# Patient Record
Sex: FEMALE | Race: BLACK OR AFRICAN AMERICAN | NOT HISPANIC OR LATINO | Employment: UNEMPLOYED | URBAN - METROPOLITAN AREA
[De-identification: names, ages, dates, MRNs, and addresses within clinical notes are randomized per-mention and may not be internally consistent; named-entity substitution may affect disease eponyms.]

---

## 2017-01-03 ENCOUNTER — TELEPHONE (OUTPATIENT)
Dept: ALLERGY | Facility: CLINIC | Age: 42
End: 2017-01-03

## 2017-01-03 NOTE — TELEPHONE ENCOUNTER
----- Message from Iam Sainz MA sent at 12/28/2016  4:11 PM CST -----  Contact: Cheyanne mcgrath/[facility] - [phone] Fax # [phone]  Requesting a New  Rx for Hizentra w/Supplies. Please call. Thanks!

## 2017-01-13 DIAGNOSIS — M35.00 SJOGREN'S SYNDROME: ICD-10-CM

## 2017-01-13 DIAGNOSIS — K11.1 PAROTID GLAND ENLARGEMENT: ICD-10-CM

## 2017-01-13 RX ORDER — HYDROXYCHLOROQUINE SULFATE 200 MG/1
400 TABLET, FILM COATED ORAL DAILY
Qty: 120 TABLET | Refills: 3 | Status: SHIPPED | OUTPATIENT
Start: 2017-01-13 | End: 2017-03-28 | Stop reason: SDUPTHER

## 2017-01-18 ENCOUNTER — TELEPHONE (OUTPATIENT)
Dept: ALLERGY | Facility: CLINIC | Age: 42
End: 2017-01-18

## 2017-01-18 NOTE — TELEPHONE ENCOUNTER
Received fax from [facility] for patient's plan of care.  Form completed. Please review and sign and I will fax back to [phone].    Form placed on your desk.

## 2017-03-28 ENCOUNTER — OFFICE VISIT (OUTPATIENT)
Dept: RHEUMATOLOGY | Facility: CLINIC | Age: 42
End: 2017-03-28
Payer: COMMERCIAL

## 2017-03-28 VITALS
HEART RATE: 84 BPM | SYSTOLIC BLOOD PRESSURE: 99 MMHG | DIASTOLIC BLOOD PRESSURE: 70 MMHG | HEIGHT: 64 IN | WEIGHT: 208.88 LBS | BODY MASS INDEX: 35.66 KG/M2

## 2017-03-28 DIAGNOSIS — K11.1 PAROTID GLAND ENLARGEMENT: ICD-10-CM

## 2017-03-28 DIAGNOSIS — D80.3 IGG2 DEFICIENCY: ICD-10-CM

## 2017-03-28 DIAGNOSIS — E66.9 OBESITY (BMI 30-39.9): ICD-10-CM

## 2017-03-28 DIAGNOSIS — M35.00 SJOGREN'S SYNDROME: Primary | ICD-10-CM

## 2017-03-28 DIAGNOSIS — Z79.899 LONG-TERM USE OF HYDROXYCHLOROQUINE: ICD-10-CM

## 2017-03-28 PROCEDURE — 99214 OFFICE O/P EST MOD 30 MIN: CPT | Mod: 25,S$GLB,, | Performed by: INTERNAL MEDICINE

## 2017-03-28 PROCEDURE — 1160F RVW MEDS BY RX/DR IN RCRD: CPT | Mod: S$GLB,,, | Performed by: INTERNAL MEDICINE

## 2017-03-28 PROCEDURE — 99999 PR PBB SHADOW E&M-EST. PATIENT-LVL III: CPT | Mod: PBBFAC,,, | Performed by: INTERNAL MEDICINE

## 2017-03-28 RX ORDER — MEDROXYPROGESTERONE ACETATE 10 MG/1
10 TABLET ORAL DAILY
Refills: 0 | COMMUNITY
Start: 2017-02-24 | End: 2017-04-27

## 2017-03-28 RX ORDER — PILOCARPINE HYDROCHLORIDE 5 MG/1
5 TABLET, FILM COATED ORAL 4 TIMES DAILY
Qty: 360 TABLET | Refills: 3 | Status: SHIPPED | OUTPATIENT
Start: 2017-03-28 | End: 2018-02-08

## 2017-03-28 RX ORDER — HYDROXYCHLOROQUINE SULFATE 200 MG/1
400 TABLET, FILM COATED ORAL DAILY
Qty: 120 TABLET | Refills: 3 | Status: SHIPPED | OUTPATIENT
Start: 2017-03-28 | End: 2017-08-14 | Stop reason: SDUPTHER

## 2017-03-28 ASSESSMENT — ROUTINE ASSESSMENT OF PATIENT INDEX DATA (RAPID3)
PATIENT GLOBAL ASSESSMENT SCORE: 4
AM STIFFNESS SCORE: 1, YES
PAIN SCORE: 2
TOTAL RAPID3 SCORE: 2
WHEN YOU AWAKENED IN THE MORNING OVER THE LAST WEEK, PLEASE INDICATE THE AMOUNT OF TIME IT TAKES UNTIL YOU ARE AS LIMBER AS YOU WILL BE FOR THE DAY: 15 MIN
FATIGUE SCORE: 6
PSYCHOLOGICAL DISTRESS SCORE: 1.1
MDHAQ FUNCTION SCORE: 0

## 2017-03-28 NOTE — PROGRESS NOTES
Subjective:       Patient ID: Manasa Hollins is a 41 y.o. female with Sjogren's syndrome.     Chief Complaint: Follow-up    Returns for follow-up. Last seen on 11/22/16. Has Sjogren's with + serology & marked hypergammaglobulinemia but with IgG2 deficiency and recurrent ear & sinus infections. She was prescribed Hizentra (immune globulin) SC and started it at the end of December of last year. She also has anemia, most likely of chronic disease. She has been seen by hematology who felt anemia was due to chronic inflammation with associated abnormal utilization of iron..    She is doing well. She is taking HCQ and has eye exams and salagen which she supplements with OTC DenTek OraMoist patches at night. She has not had any more parotid gland swelling.She uses OTC drops for dry eyes. (She is followed by outside ophthalmologist for both Sjogren's & HCQ use (400 mg/d). Was told she does not have eye staining.) Swallowing improved. She denies AM stiffness,. Denies joint pain except still for infrequent  bilateral knee pain at times associated with doing too much; denies joint swelling, Raynaud's, tight skin, alopecia, oral ulcers, pericarditis, photosensitivity, skin rashes, miscarriages (but had one premature birth at 26.5 weeks) & thromboses.    Pertinent hx from previous visit:   40 yr old lady with chronic sinusitis with dry nose, mouth and & dry eyes (keratitis documented-photo displayed on 10/12/15 visit) initially sent to rule out Sjogren's after she requested testing and was found to have a +MANISHA 1:250 sp with +SSA & +SSB. Found to have non tender parotid gland enlargement.  She was previously begun on pilocarpine 5 mg qid  Sxs remain the same--she minimizes them. She did not check with pulmonary as she was admitted in November with fever and bilateral otomastoiditis. She is now improved. Gets does get frequent dental cleaning              She reports no joint swelling. Associated symptoms include fatigue.  Pertinent negatives include no fever, trouble swallowing, myalgias or headaches.          Current Outpatient Prescriptions   Medication Sig Dispense Refill    albuterol (PROAIR HFA) 90 mcg/actuation inhaler INHALE 2 PUFFS INTO THE LUNGS EVERY 4 HOURS AS NEEDED 8.5 g 11    b complex vitamins tablet Take 1 tablet by mouth once daily.      CALCIUM CARBONATE/VITAMIN D3 (VITAMIN D-3 ORAL) Take 4,000 Units by mouth once daily.      calcium-magnesium-zinc 333-133-5 mg Tab Take 1 tablet by mouth once daily.      CARBOXYMETHYLCELLULOSE SODIUM (REFRESH TEARS OPHT) Apply to eye.      fluticasone (FLONASE) 50 mcg/actuation nasal spray 2 sprays left nostril BID; 2 sprays right nostril once a day 1 Bottle 3    GLYCERIN/PROPYLENE GLYCOL (SOOTHE LUBRICANT OPHT) Apply to eye.      hydroxychloroquine (PLAQUENIL) 200 mg tablet Take 2 tablets (400 mg total) by mouth once daily. 120 tablet 3    immun glob G,IgG,-pro-IgA 0-50 (HIZENTRA) 10 gram/50 mL (20 %) Soln Inject 10 g into the skin once a week. First infusion must be done at infusion center due to hx sulfa allergy 4 vial 11    levocetirizine (XYZAL) 5 MG tablet TAKE 1 TABLET BY MOUTH EVERY EVENING 30 tablet 1    linaclotide (LINZESS) 145 mcg Cap capsule Take 1 capsule (145 mcg total) by mouth once daily. 30 capsule 11    omeprazole (PRILOSEC) 40 MG capsule TAKE 1 CAPSULE (40 MG TOTAL) BY MOUTH EVERY MORNING. 90 capsule 3    pilocarpine (SALAGEN) 5 MG Tab Take 1 tablet (5 mg total) by mouth 4 (four) times daily. 360 tablet 3    PREVIDENT 5000 PLUS 1.1 % Crea       saliva stimulant agents comb.3 (BIOTENE MOISTURIZING MOUTH) Spry by Mucous Membrane route.      SALIVA SUBSTITUTION COMBO NO.9 (BIOTENE DRY MOUTH ORAL RINSE MM) by Mucous Membrane route.      SODIUM CHLORIDE/ALOE VERA (AYR SALINE GEL NASL) by Nasal route.      vitamin A 8000 UNIT capsule Take 8,000 Units by mouth once daily.      ADVAIR DISKUS 250-50 mcg/dose diskus inhaler       famotidine (PEPCID) 20  MG tablet Take 1 tablet (20 mg total) by mouth 2 (two) times daily. 20 tablet 0    medroxyPROGESTERone (PROVERA) 10 MG tablet Take 10 mg by mouth once daily.  0    ondansetron (ZOFRAN-ODT) 4 MG TbDL Take 2 tablets (8 mg total) by mouth every 8 (eight) hours as needed (Nausea). 15 tablet 0     No current facility-administered medications for this visit.          Review of Systems   Constitutional: Positive for fatigue. Negative for diaphoresis and fever.   HENT: Negative for mouth sores, sore throat, tinnitus and trouble swallowing.         Dry mouth   Eyes: Negative.  Negative for visual disturbance.        Dry eyes   Respiratory: Positive for cough and shortness of breath (warm weather makes her pant; maybe allergic to oak trees). Negative for choking and chest tightness.    Cardiovascular: Negative for palpitations and leg swelling.   Gastrointestinal: Negative for abdominal distention, abdominal pain, blood in stool, constipation (helped by linzess), diarrhea, nausea and vomiting.        Heartburn   Endocrine: Positive for cold intolerance.   Genitourinary: Negative.  Negative for frequency, hematuria and menstrual problem.   Musculoskeletal: Positive for arthralgias (knees without swelling.). Negative for back pain, joint swelling, myalgias, neck pain and neck stiffness.   Skin: Negative.  Negative for rash.   Allergic/Immunologic: Negative.    Neurological: Negative for dizziness, syncope, weakness, light-headedness, numbness and headaches.   Hematological: Negative.  Negative for adenopathy. Does not bruise/bleed easily.   Psychiatric/Behavioral: Positive for sleep disturbance. Negative for dysphoric mood. The patient is not nervous/anxious.          Review of patient's allergies indicates:   Allergen Reactions    Sulfa dyne      Other reaction(s): CAUSES ASTHMA ATTACK         Past Surgical History:   Procedure Laterality Date     SECTION      x4    MYOMECTOMY      TUBAL LIGATION      done during  "myometomy surgery.    upper gi  2016         Family History   Problem Relation Age of Onset    Cancer Mother      Lung/Cervical    Cancer Maternal Grandmother      Breast/Cervical    Cancer Other      Breast     Asthma Child     Emphysema Neg Hx     Colon cancer Neg Hx     Stomach cancer Neg Hx     Esophageal cancer Neg Hx     Ulcerative colitis Neg Hx     Crohn's disease Neg Hx     Irritable bowel syndrome Neg Hx     Celiac disease Neg Hx    Mom & Dad both had sickle cell trait.  Mom  with lung cancer (non smoker) & had cervical cancer and another cancer as well.    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   Objective:     Ht 5' 4" (1.626 m)  Wt 94.8 kg (208 lb 14.4 oz)  LMP 2017 (Exact Date)  BMI 35.86 kg/m2   Was 198 lbs 12.8 oz on 16.  Was 191 lbs 6.4 oz on 16.   Physical Exam   Vitals reviewed.  Constitutional: She is oriented to person, place, and time and well-developed, well-nourished, and in no distress. No distress.   HENT:   Head: Normocephalic and atraumatic.   Mouth/Throat: Oropharynx is clear and moist. No oropharyngeal exudate.   No facial rashes  Parotids minimally enlarged &, non tender  Lacrimals minimally enlarged  Decreased  moisture of oral mucosa   No oral ulcers  Teeth in good repair   Eyes: Conjunctivae and EOM are normal. Pupils are equal, round, and reactive to light. Right eye exhibits no discharge. Left eye exhibits no discharge. No scleral icterus.   Neck: Neck supple. No JVD present. No tracheal deviation present. No thyromegaly present.   Cardiovascular: Normal rate, regular rhythm, normal heart sounds and intact distal pulses.  Exam reveals no gallop and no friction rub.    No murmur heard.  Pulmonary/Chest: Breath sounds " normal. No respiratory distress. She has no wheezes. She has no rales. She exhibits no tenderness.   Abdominal: Soft. Bowel sounds are normal. She exhibits no distension and no mass. There is no splenomegaly or hepatomegaly. There is no tenderness. There is no rebound and no guarding.   Lymphadenopathy:     She has no cervical adenopathy.        Right: No inguinal adenopathy present.        Left: No inguinal adenopathy present.   Neurological: She is alert and oriented to person, place, and time. She has normal reflexes. No cranial nerve deficit. Gait normal.   Proximal and distal muscle strength 5/5 except for 4+/5 proximal LE; can squat but cannot get up without falling.   Skin: Skin is warm and dry. No rash noted. She is not diaphoretic.     Psychiatric: Mood, memory, affect and judgment normal.   Musculoskeletal: Normal range of motion. She exhibits no edema or tenderness.   Cspine FROM no tenderness  Tspine FROM no tenderness  Lspine FROM no tenderness.  TMJ: unremarkable  Shoulders: FROM; no synovitis;  Elbows: FROM; no synovitis; no tophi or nodules  Wrists: FROM; no synovitis;    MCPs: FROM; Mild enlargement of 1st MCP on left;no synovitis; no metacarpalgia;  ok;  PIPs: FROM; no synovitis; no tenderness of any joints.   DIPs: FROM; no synovitis;   HIPS: FROM  Knees: FROM; no crepitus; no synovitis; no instability;  Ankles: FROM: no synovitis   Toes: ok; no metatarsalgia             LABS:  12/21/16: Hg 9.8; Ht 29.6; Na 135; Alb. 2.9;   8/24/16: ; CRP 3.9; Hg 8.2; Ht 25.2; CMP TP 9.4; Alb. 2.4; Ca 8.4; CPK 88; SPE elevated TP & gammaglobulins with oligoclonal bands.   7/2/16: ;   5/3/16: Hg 8.9; Ht 27.5; IgG1 3490 (1140); IgG2 56 (150);  4/21/16: ; CRP 6.7; Hg 9.1; Ht 28.7; CMP Alb. 2.8; TP 9.4; UA ok; U pr/cnne .37 (.20)  3/11/16: CMP Ca 8.2; Alb. 1.4  3/7/16: UA 3+ pr; 2+ OB; * RBCs 16 WBC, 9 HC  12/1/15: Hg 7.3; Ht 22.5; K+ 3.4; Ca 8.6;   8/18/15: UA ok;  8/12/15: LAC neg; aCLA neg;  "C3, C4 ok; dsDNA neg;       Imagin16: Bilateral knee x-rays: personally reviewed: very mild bilateral DJD.  16: CT chest: personally reviewed: unremarkable.  16: CXR: personally reviewed: prob. Ok; RLL opacity c/w scarring  10/12/15: CXR: personally reviewed with patient: OK  10/12/15: Bilateral Hands: personally reviewed with patient: ok  Assessment:   Sjogren's syndrome -- stable/improved    Sicca sxs with dry mouth with dental carries, dry eyes & dry mouth     Response to pilocarpine      But cough & mild SOB following--patient opts to continue    Bilateral parotid gland swelling    +MANISHA 1:2560; +SSA; +SSB: TP 10.1;     RF neg; LAC, aCLA neg (premature birth at 26.5 wks).     C3, C4, UA ok; elevated IgG 4489 (1600); IgA 495 (350);     + FH SLE (sister)    Bilateral knee pain/weakness--stable   Imaging with minimal bilateral OA      Recurrent ENT & possibly pulmonary infections associated with IgG2 deficiency   S/P bilateral otomastoiditis with fever hospitalized 11/15    Possibly parotidomegaly played a role.   Pulmonary issues:     S/P CAP 3/17/16:     Also with chronic cough & SOB     She's had "allergic cough" in past.     Salagen as a culprit ruled out     CT chest 16: unremarkable   Inadequate response to pneumovax   IgG2 deficiency on Hizentra     Reactive Airways/Asthma    Some response to advair    Anemia: NCNC--probably mostly chronic disease with mild iron deficiency--improved    Hg electrophoresis A2   Lowest Ht 20.8;    Mom & Dad had trait   Some component of iron deficiency in past   retic ok; MMA ok;    G6PD ok; hapto not low    Obesity-worsening      Plan:   Stay on  mg/d with eye exams.  Continue pilocarpine 5 mg qid  Continue paraffin wax, OTC DenTek Ora Moist Patches, etc.   We have ordered labs to add to those Dr. Alejo will order labs as f/u on Hizentra.  Counseled on weight loss--through proper diet and exercise. Counseled on eating strategies.  RTC 5 " months or prn.

## 2017-03-28 NOTE — MR AVS SNAPSHOT
[facility]  [address]  [facility] [address]  Phone: [phone]  Fax: [phone]                  Manasa Hollins   3/28/2017 8:30 AM   Office Visit    Description:  Female : 1975   Provider:  Susan Bustos MD   Department:  [facility] - Rheumatology           Reason for Visit     Follow-up           Diagnoses this Visit        Comments    Sjogren's syndrome    -  Primary     Parotid gland enlargement         IgG2 deficiency         Long-term use of hydroxychloroquine         Obesity (BMI 30-39.9)                To Do List           Goals (5 Years of Data)     None       These Medications        Disp Refills Start End    pilocarpine (SALAGEN) 5 MG Tab 360 tablet 3 3/28/2017     Take 1 tablet (5 mg total) by mouth 4 (four) times daily. - Oral    Pharmacy: [facility]/pharmacy #[id number] [facility] [address] [facility] Ph #: [phone]       hydroxychloroquine (PLAQUENIL) 200 mg tablet 120 tablet 3 3/28/2017     Take 2 tablets (400 mg total) by mouth once daily. - Oral    Pharmacy: [facility]/pharmacy #[id number] [facility] [address] [facility] Ph #: [phone]         [facility]     [facility] On Call Nurse Care Line -  Assistance  Registered nurses in the [facility] provide clinical advisement, health education, appointment booking, and other advisory services.  Call for this free service at [phone].             Medications           Message regarding Medications     Verify the changes and/or additions to your medication regime listed below are the same as discussed with your clinician today.  If any of these changes or additions are incorrect, please notify your healthcare provider.             Verify that the below list of medications is an accurate representation of the medications you are currently taking.  If none reported, the list may be blank. If incorrect, please contact your healthcare provider. Carry  this list with you in case of emergency.           Current Medications     albuterol (PROAIR HFA) 90 mcg/actuation inhaler INHALE 2 PUFFS INTO THE LUNGS EVERY 4 HOURS AS NEEDED    b complex vitamins tablet Take 1 tablet by mouth once daily.    CALCIUM CARBONATE/VITAMIN D3 (VITAMIN D-3 ORAL) Take 4,000 Units by mouth once daily.    calcium-magnesium-zinc 333-133-5 mg Tab Take 1 tablet by mouth once daily.    CARBOXYMETHYLCELLULOSE SODIUM (REFRESH TEARS OPHT) Apply to eye.    fluticasone (FLONASE) 50 mcg/actuation nasal spray 2 sprays left nostril BID; 2 sprays right nostril once a day    GLYCERIN/PROPYLENE GLYCOL (SOOTHE LUBRICANT OPHT) Apply to eye.    hydroxychloroquine (PLAQUENIL) 200 mg tablet Take 2 tablets (400 mg total) by mouth once daily.    immun glob G,IgG,-pro-IgA 0-50 (HIZENTRA) 10 gram/50 mL (20 %) Soln Inject 10 g into the skin once a week. First infusion must be done at infusion center due to hx sulfa allergy    levocetirizine (XYZAL) 5 MG tablet TAKE 1 TABLET BY MOUTH EVERY EVENING    linaclotide (LINZESS) 145 mcg Cap capsule Take 1 capsule (145 mcg total) by mouth once daily.    omeprazole (PRILOSEC) 40 MG capsule TAKE 1 CAPSULE (40 MG TOTAL) BY MOUTH EVERY MORNING.    pilocarpine (SALAGEN) 5 MG Tab Take 1 tablet (5 mg total) by mouth 4 (four) times daily.    PREVIDENT 5000 PLUS 1.1 % Crea     saliva stimulant agents comb.3 (BIOTENE MOISTURIZING MOUTH) Spry by Mucous Membrane route.    SALIVA SUBSTITUTION COMBO NO.9 (BIOTENE DRY MOUTH ORAL RINSE MM) by Mucous Membrane route.    SODIUM CHLORIDE/ALOE VERA (AYR SALINE GEL NASL) by Nasal route.    vitamin A 8000 UNIT capsule Take 8,000 Units by mouth once daily.    ADVAIR DISKUS 250-50 mcg/dose diskus inhaler     famotidine (PEPCID) 20 MG tablet Take 1 tablet (20 mg total) by mouth 2 (two) times daily.    medroxyPROGESTERone (PROVERA) 10 MG tablet Take 10 mg by mouth once daily.    ondansetron (ZOFRAN-ODT) 4 MG TbDL Take 2 tablets (8 mg total) by mouth  "every 8 (eight) hours as needed (Nausea).           Clinical Reference Information           Your Vitals Were     BP Pulse Height Weight Last Period BMI    99/70 (BP Location: Left arm, Patient Position: Sitting, BP Method: Automatic) 84 5' 4" (1.626 m) 94.8 kg (208 lb 14.4 oz) 02/07/2017 (Exact Date) 35.86 kg/m2      Blood Pressure          Most Recent Value    BP  99/70      Allergies as of 3/28/2017     Sulfa Dyne      Immunizations Administered on Date of Encounter - 3/28/2017     None      Orders Placed During Today's Visit     Recurring Lab Work Interval Expires    C-reactive protein   3/28/2018    CBC auto differential   3/28/2018    Comprehensive metabolic panel   3/28/2018    Sedimentation rate, manual   3/28/2018      Language Assistance Services     ATTENTION: Language assistance services are available, free of charge. Please call [phone].      ATENCIÓN: Si habla español, tiene a joel disposición servicios gratuitos de asistencia lingüística. Llame al [phone].     LIUS MIGUEL Ý: N?u b?n nói Ti?ng Vi?t, có các d?ch v? h? tr? ngôn ng? mi?n phí dành cho b?n. G?i s? [phone].         [facility] complies with applicable Federal civil rights laws and does not discriminate on the basis of race, color, national origin, age, disability, or sex.        "

## 2017-04-19 ENCOUNTER — PATIENT MESSAGE (OUTPATIENT)
Dept: FAMILY MEDICINE | Facility: CLINIC | Age: 42
End: 2017-04-19

## 2017-04-19 ENCOUNTER — PATIENT MESSAGE (OUTPATIENT)
Dept: ALLERGY | Facility: CLINIC | Age: 42
End: 2017-04-19

## 2017-04-19 NOTE — TELEPHONE ENCOUNTER
Please advise.  When do you want to see this patient. Her first Hizentra treatment was 12/23/2016.

## 2017-04-20 RX ORDER — AZELASTINE HYDROCHLORIDE 0.5 MG/ML
1 SOLUTION/ DROPS OPHTHALMIC 2 TIMES DAILY
Qty: 4 ML | Refills: 0 | Status: SHIPPED | OUTPATIENT
Start: 2017-04-20 | End: 2017-06-01

## 2017-04-27 ENCOUNTER — OFFICE VISIT (OUTPATIENT)
Dept: ALLERGY | Facility: CLINIC | Age: 42
End: 2017-04-27
Payer: COMMERCIAL

## 2017-04-27 ENCOUNTER — LAB VISIT (OUTPATIENT)
Dept: LAB | Facility: HOSPITAL | Age: 42
End: 2017-04-27
Attending: INTERNAL MEDICINE
Payer: COMMERCIAL

## 2017-04-27 VITALS
OXYGEN SATURATION: 92 % | HEIGHT: 64 IN | HEART RATE: 82 BPM | DIASTOLIC BLOOD PRESSURE: 72 MMHG | BODY MASS INDEX: 34.77 KG/M2 | WEIGHT: 203.69 LBS | SYSTOLIC BLOOD PRESSURE: 128 MMHG

## 2017-04-27 DIAGNOSIS — J33.9 LEFT NASAL POLYPS: ICD-10-CM

## 2017-04-27 DIAGNOSIS — M35.00 SJOGREN'S SYNDROME, WITH UNSPECIFIED ORGAN INVOLVEMENT: ICD-10-CM

## 2017-04-27 DIAGNOSIS — D80.3 IGG2 SUBCLASS DEFICIENCY: ICD-10-CM

## 2017-04-27 DIAGNOSIS — K21.9 GASTROESOPHAGEAL REFLUX DISEASE, ESOPHAGITIS PRESENCE NOT SPECIFIED: ICD-10-CM

## 2017-04-27 DIAGNOSIS — M35.00 SJOGREN'S SYNDROME: ICD-10-CM

## 2017-04-27 DIAGNOSIS — D80.6 ANTI-POLYSACCHARIDE ANTIBODY DEFICIENCY: Primary | ICD-10-CM

## 2017-04-27 DIAGNOSIS — K11.1 PAROTID GLAND ENLARGEMENT: ICD-10-CM

## 2017-04-27 LAB
ALBUMIN SERPL BCP-MCNC: 2.6 G/DL
ALP SERPL-CCNC: 27 U/L
ALT SERPL W/O P-5'-P-CCNC: 18 U/L
ANION GAP SERPL CALC-SCNC: 6 MMOL/L
AST SERPL-CCNC: 23 U/L
BASOPHILS # BLD AUTO: 0.02 K/UL
BASOPHILS NFR BLD: 0.4 %
BILIRUB SERPL-MCNC: 0.5 MG/DL
BUN SERPL-MCNC: 12 MG/DL
CALCIUM SERPL-MCNC: 9 MG/DL
CHLORIDE SERPL-SCNC: 108 MMOL/L
CO2 SERPL-SCNC: 19 MMOL/L
CREAT SERPL-MCNC: 0.7 MG/DL
CRP SERPL-MCNC: 4.6 MG/L
DIFFERENTIAL METHOD: ABNORMAL
EOSINOPHIL # BLD AUTO: 0.1 K/UL
EOSINOPHIL NFR BLD: 1.1 %
ERYTHROCYTE [DISTWIDTH] IN BLOOD BY AUTOMATED COUNT: 14.3 %
ERYTHROCYTE [SEDIMENTATION RATE] IN BLOOD BY WESTERGREN METHOD: 135 MM/HR
EST. GFR  (AFRICAN AMERICAN): >60 ML/MIN/1.73 M^2
EST. GFR  (NON AFRICAN AMERICAN): >60 ML/MIN/1.73 M^2
GLUCOSE SERPL-MCNC: 87 MG/DL
HCT VFR BLD AUTO: 28.9 %
HGB BLD-MCNC: 9.4 G/DL
LYMPHOCYTES # BLD AUTO: 1.2 K/UL
LYMPHOCYTES NFR BLD: 22.5 %
MCH RBC QN AUTO: 28.8 PG
MCHC RBC AUTO-ENTMCNC: 32.5 %
MCV RBC AUTO: 89 FL
MONOCYTES # BLD AUTO: 0.3 K/UL
MONOCYTES NFR BLD: 6 %
NEUTROPHILS # BLD AUTO: 3.8 K/UL
NEUTROPHILS NFR BLD: 69.8 %
PLATELET # BLD AUTO: 195 K/UL
PMV BLD AUTO: 11.7 FL
POTASSIUM SERPL-SCNC: 4.2 MMOL/L
PROT SERPL-MCNC: 10.1 G/DL
RBC # BLD AUTO: 3.26 M/UL
SODIUM SERPL-SCNC: 133 MMOL/L
WBC # BLD AUTO: 5.46 K/UL

## 2017-04-27 PROCEDURE — 1160F RVW MEDS BY RX/DR IN RCRD: CPT | Mod: S$GLB,,, | Performed by: ALLERGY & IMMUNOLOGY

## 2017-04-27 PROCEDURE — 36415 COLL VENOUS BLD VENIPUNCTURE: CPT

## 2017-04-27 PROCEDURE — 85651 RBC SED RATE NONAUTOMATED: CPT

## 2017-04-27 PROCEDURE — 99999 PR PBB SHADOW E&M-EST. PATIENT-LVL IV: CPT | Mod: PBBFAC,,, | Performed by: ALLERGY & IMMUNOLOGY

## 2017-04-27 PROCEDURE — 80053 COMPREHEN METABOLIC PANEL: CPT

## 2017-04-27 PROCEDURE — 86140 C-REACTIVE PROTEIN: CPT

## 2017-04-27 PROCEDURE — 99214 OFFICE O/P EST MOD 30 MIN: CPT | Mod: S$GLB,,, | Performed by: ALLERGY & IMMUNOLOGY

## 2017-04-27 PROCEDURE — 85025 COMPLETE CBC W/AUTO DIFF WBC: CPT

## 2017-04-27 RX ORDER — ALBUTEROL SULFATE 90 UG/1
2 AEROSOL, METERED RESPIRATORY (INHALATION) EVERY 4 HOURS PRN
Qty: 1 INHALER | Refills: 3 | Status: SHIPPED | OUTPATIENT
Start: 2017-04-27 | End: 2018-02-08 | Stop reason: SDUPTHER

## 2017-04-27 RX ORDER — FLUTICASONE PROPIONATE 50 MCG
SPRAY, SUSPENSION (ML) NASAL
Qty: 1 BOTTLE | Refills: 3 | Status: SHIPPED | OUTPATIENT
Start: 2017-04-27 | End: 2018-01-11

## 2017-04-27 NOTE — MR AVS SNAPSHOT
[facility]  [address]  [facility] [address]  Phone: [phone]  Fax: [phone]                  Manasa Hollins   2017 9:30 AM   Office Visit    Description:  Female : 1975   Provider:  Ivan Alejo MD   Department:  [facility]           Reason for Visit     Follow-up           Diagnoses this Visit        Comments    S/P functional endoscopic sinus surgery         Left nasal polyps                To Do List           Future Appointments        Provider Department Dept Phone    2017 10:20 AM LAB, APPOINTMENT [facility] [phone]    2017 4:00 PM Sawyer Bernardo MD [facility] Otorhinolaryngology [phone]      Goals (5 Years of Data)     None       These Medications        Disp Refills Start End    albuterol 90 mcg/actuation inhaler 1 Inhaler 3 2017     Inhale 2 puffs into the lungs every 4 (four) hours as needed for Wheezing or Shortness of Breath. - Inhalation    Pharmacy: [facility]/pharmacy #[id number] [facility] [address] [employer] Ph #: [phone]       fluticasone (FLONASE) 50 mcg/actuation nasal spray 1 Bottle 3 2017     2 sprays left nostril BID; 2 sprays right nostril once a day    Pharmacy: [facility]/pharmacy #[id number] [place] [address] [employer] Ph #: [phone]         [facility] On Call     [facility] Nurse Care Line -  Assistance  Unless otherwise directed by your provider, please contact [facility], our nurse care line that is available for  assistance.     Registered nurses in the [facility] provide: appointment scheduling, clinical advisement, health education, and other advisory services.  Call: [phone] (toll free)               Medications           Message regarding Medications     Verify the changes and/or additions to your medication regime listed below are the same as discussed with  your clinician today.  If any of these changes or additions are incorrect, please notify your healthcare provider.        START taking these NEW medications        Refills    albuterol 90 mcg/actuation inhaler 3    Sig: Inhale 2 puffs into the lungs every 4 (four) hours as needed for Wheezing or Shortness of Breath.    Class: Normal    Route: Inhalation      STOP taking these medications     famotidine (PEPCID) 20 MG tablet Take 1 tablet (20 mg total) by mouth 2 (two) times daily.    ondansetron (ZOFRAN-ODT) 4 MG TbDL Take 2 tablets (8 mg total) by mouth every 8 (eight) hours as needed (Nausea).    medroxyPROGESTERone (PROVERA) 10 MG tablet Take 10 mg by mouth once daily.           Verify that the below list of medications is an accurate representation of the medications you are currently taking.  If none reported, the list may be blank. If incorrect, please contact your healthcare provider. Carry this list with you in case of emergency.           Current Medications     albuterol (PROAIR HFA) 90 mcg/actuation inhaler INHALE 2 PUFFS INTO THE LUNGS EVERY 4 HOURS AS NEEDED    azelastine (OPTIVAR) 0.05 % ophthalmic solution Place 1 drop into both eyes 2 (two) times daily.    b complex vitamins tablet Take 1 tablet by mouth once daily.    CALCIUM CARBONATE/VITAMIN D3 (VITAMIN D-3 ORAL) Take 4,000 Units by mouth once daily.    calcium-magnesium-zinc 333-133-5 mg Tab Take 1 tablet by mouth once daily.    CARBOXYMETHYLCELLULOSE SODIUM (REFRESH TEARS OPHT) Apply to eye.    fluticasone (FLONASE) 50 mcg/actuation nasal spray 2 sprays left nostril BID; 2 sprays right nostril once a day    GLYCERIN/PROPYLENE GLYCOL (SOOTHE LUBRICANT OPHT) Apply to eye.    hydroxychloroquine (PLAQUENIL) 200 mg tablet Take 2 tablets (400 mg total) by mouth once daily.    immun glob G,IgG,-pro-IgA 0-50 (HIZENTRA) 10 gram/50 mL (20 %) Soln Inject 10 g into the skin once a week. First infusion must be done at infusion center due to hx sulfa allergy  "   levocetirizine (XYZAL) 5 MG tablet TAKE 1 TABLET BY MOUTH EVERY EVENING    linaclotide (LINZESS) 145 mcg Cap capsule Take 1 capsule (145 mcg total) by mouth once daily.    omeprazole (PRILOSEC) 40 MG capsule TAKE 1 CAPSULE (40 MG TOTAL) BY MOUTH EVERY MORNING.    pilocarpine (SALAGEN) 5 MG Tab Take 1 tablet (5 mg total) by mouth 4 (four) times daily.    PREVIDENT 5000 PLUS 1.1 % Crea     saliva stimulant agents comb.3 (BIOTENE MOISTURIZING MOUTH) Spry by Mucous Membrane route.    SALIVA SUBSTITUTION COMBO NO.9 (BIOTENE DRY MOUTH ORAL RINSE MM) by Mucous Membrane route.    SODIUM CHLORIDE/ALOE VERA (AYR SALINE GEL NASL) by Nasal route.    vitamin A 8000 UNIT capsule Take 8,000 Units by mouth once daily.    ADVAIR DISKUS 250-50 mcg/dose diskus inhaler     albuterol 90 mcg/actuation inhaler Inhale 2 puffs into the lungs every 4 (four) hours as needed for Wheezing or Shortness of Breath.           Clinical Reference Information           Your Vitals Were     BP Pulse Height Weight Last Period SpO2    128/72 (BP Location: Right arm, Patient Position: Sitting, BP Method: Manual) 82 5' 4" (1.626 m) 92.4 kg (203 lb 11.3 oz) 02/07/2017 (Exact Date) 92%    BMI                34.97 kg/m2          Blood Pressure          Most Recent Value    BP  128/72      Allergies as of 4/27/2017     Sulfa Dyne      Immunizations Administered on Date of Encounter - 4/27/2017     None      Language Assistance Services     ATTENTION: Language assistance services are available, free of charge. Please call [phone].      ATENCIÓN: Si habla español, tiene a joel disposición servicios gratuitos de asistencia lingüística. Llame al [phone].     LUIS MIGUEL Ý: N?u b?n nói Ti?ng Vi?t, có các d?ch v? h? tr? ngôn ng? mi?n phí dành cho b?n. G?i s? [phone].         [facility] complies with applicable Federal civil rights laws and does not discriminate on the basis of race, color, national origin, age, disability, or sex.   "

## 2017-04-27 NOTE — PROGRESS NOTES
Subjective:       Patient ID: Manasa Hollins is a 41 y.o. female.     9/7/16    Chief Complaint:  Follow-up (Hizentra)  Fu recurrent sinus infections infections, low pneumococcal titers. Now s/p prevnar and 2nd pneumovax    Hizentra started Dec '16    HPI:     Pt w Sjogren's synd, anemia, GERD, AR, and specific antibody def w IgG2 def and hx recurrent sinusitis and otitis media.  S/p sinus surgery 2014 (Dr. North).    Was started on Hizentra Dec '16. She does feel this has been helpful. Since then she has notice a sig decrease in sinusitis sx's. Recalls only one interval course of abx, sooner after starting Hizentra. Has had episodes of self-limited URI sx's that last 1-2 weeks but resolve w/o abx.   Has been having frequent HAs, though not nec temporally related to Hizentra infusions. Relieved w prn excedrin migraine. She feels she is tolerating infusions well.  In recent weeks AR sx's flaring somewhat, has had increased nasal congestion, PND. Continues freq sinus rinses.  Rare interval need albuterol. No interval need oral steroids.   Hx GERD      Past Medical History:   Diagnosis Date    Asthma     Sjogren's disease    GERD  anemia      Family History   Problem Relation Age of Onset    Cancer Mother      Lung/Cervical    Cancer Maternal Grandmother      Breast/Cervical    Cancer Other      Breast     Asthma Child     Emphysema Neg Hx     Colon cancer Neg Hx     Stomach cancer Neg Hx     Esophageal cancer Neg Hx     Ulcerative colitis Neg Hx     Crohn's disease Neg Hx     Irritable bowel syndrome Neg Hx     Celiac disease Neg Hx           Environmental History: Pets in the home: dogs (1).  [place]: hardwood floors  Tobacco Smoke in Home: yes    smokes outside    Review of Systems   Constitutional: Negative for appetite change, chills, fever and unexpected weight change.   HENT: Positive for congestion, postnasal drip and sinus pressure. Negative for ear pain and facial swelling.     Eyes: Negative for photophobia, discharge and visual disturbance.   Respiratory: Negative for cough, chest tightness, shortness of breath and wheezing.    Cardiovascular: Negative for chest pain and palpitations.   Gastrointestinal: Negative for abdominal distention.   Musculoskeletal: Negative for arthralgias and joint swelling.   Neurological: Negative for dizziness and headaches.   Hematological: Negative for adenopathy. Does not bruise/bleed easily.   Psychiatric/Behavioral: Negative for behavioral problems and sleep disturbance.        Objective:    Physical Exam   Constitutional: She is oriented to person, place, and time. She appears well-developed and well-nourished. No distress.   HENT:   Head: Normocephalic.   Right Ear: Hearing, tympanic membrane and ear canal normal.   Left Ear: Hearing, tympanic membrane and ear canal normal.   Nose: No mucosal edema, rhinorrhea, sinus tenderness or septal deviation. Right sinus exhibits no maxillary sinus tenderness and no frontal sinus tenderness. Left sinus exhibits no maxillary sinus tenderness and no frontal sinus tenderness.   Mouth/Throat: Uvula is midline, oropharynx is clear and moist and mucous membranes are normal. No uvula swelling.   Eyes: Conjunctivae are normal. Right eye exhibits no discharge. Left eye exhibits no discharge.   Neck: Normal range of motion. No thyromegaly present.   Cardiovascular: Normal rate and regular rhythm.    No murmur heard.  Pulmonary/Chest: Effort normal and breath sounds normal. No respiratory distress. She has no wheezes.   Abdominal: Soft. She exhibits no distension. There is no tenderness. There is no guarding.   Musculoskeletal: Normal range of motion. She exhibits no edema or tenderness.   Lymphadenopathy:     She has no cervical adenopathy.   Neurological: She is alert and oriented to person, place, and time.   Skin: Skin is warm. No rash noted. No erythema.   Psychiatric: She has a normal mood and affect. Her behavior  is normal.   Nursing note and vitals reviewed.      Laboratory:      immunoCAPs pos to dust mites and cockroach  Results for BRIDGER SHERIDAN (MRN [id number]) as of 5/19/2016 13:24   Ref. Range 4/29/2016 11:30   IgG - Serum Latest Ref Range: 650 - 1600 mg/dL 4489 (H)   IgM Latest Ref Range: 50 - 300 mg/dL 50   IgA Latest Ref Range: 40 - 350 mg/dL 495 (H)   IgE Latest Ref Range: 0 - 100 IU/mL 84     Results for BRIDGER SHERIDAN (MRN [id number]) as of 9/7/2016 09:19   Ref. Range 5/3/2016 10:53   IgG 1 Latest Ref Range: 490 - 1140 mg/dL 3,490 (H)   IgG 2 Latest Ref Range: 150 - 640 mg/dL 56 (L)   IgG 3 Latest Ref Range: 11 - 85 mg/dL 47   IgG 4 Latest Ref Range: 3 - 201 mg/dL 38     Results for BRIDGER SHERIDAN (MRN [id number]) as of 9/7/2016 09:19   Ref. Range 4/29/2016 11:30 7/2/2016 11:12 7/5/2016 09:01 8/24/2016 08:17   S.pneumoniae Type 1 Latest Units: mcg/mL <0.3  0.4 0.7   S.pneumoniae Type 3 Latest Units: mcg/mL <0.3  <0.3 3.5   Strep pneumo Type 4 Latest Units: mcg/mL <0.3  <0.3 <0.3   S.pneumoniae Type 5 Latest Units: mcg/mL <0.3  <0.3 <0.3   S.pneumoniae Type 6B Latest Units: mcg/mL <0.3  <0.3 <0.3   S.pneumoniae Type 7F Latest Units: mcg/mL 2.3  3.0 6.1   S.pneumoniae Type 8 Latest Units: mcg/mL <0.3  <0.3 <0.3   S.pneumoniae Type 9N Latest Units: mcg/mL <0.3  <0.3 0.6   S.pneumoniae Type 9V Abs Latest Units: mcg/mL <0.3  <0.3 0.3   S.pneumoniae Type 12F Latest Units: mcg/mL <0.3  <0.3 <0.3   Strep pneumo Type 14 Latest Units: mcg/mL <0.3  0.5 0.7   S.pneumoniae Type 18C Latest Units: mcg/mL <0.3  0.3 0.3   S.pneumoniae Type 19F Latest Units: mcg/mL <0.3  0.4 0.5   S.pneumoniae Type 23F Latest Units: mcg/mL <0.3  <0.3 <0.3       CT Sinuses 12/17/16    Impression:  Postsurgical changes of prior functional endoscopic sinus surgery. Left medial antrostomy is patent, but the right is now obstructed.  Significant increase in mucosal thickening or fluid opacification of paranasal sinuses as above.  Hyperattenuating material within the right frontal sinus can be seen with inspissated secretions or fungal elements.     Question left-sided nasal polyposis.      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 Sjogren's disease    3. Gastroesophageal reflux disease, esophagitis presence not specified    4. Recurrent sinusitis    5. Anemia of chronic disease    6. Mild persistent asthma without complication    7. Poss L nasal polyposis     Plan:       1. Continue weekly SQ IgG replacement, Hizentra, 10 g SQ weekly (~435 mg/kg/mo).   2. Refer to ENT re abnl CT sinuses, prev sinus surgery  3. flonase 2 sen bid  4.  xyzal  5. Cont prilosec for GERD  6. Fu 6 mo, sooner prn. Re-evaluate hizentra dose

## 2017-05-30 ENCOUNTER — PATIENT MESSAGE (OUTPATIENT)
Dept: ALLERGY | Facility: CLINIC | Age: 42
End: 2017-05-30

## 2017-05-30 ENCOUNTER — PATIENT MESSAGE (OUTPATIENT)
Dept: FAMILY MEDICINE | Facility: CLINIC | Age: 42
End: 2017-05-30

## 2017-06-01 ENCOUNTER — OFFICE VISIT (OUTPATIENT)
Dept: ALLERGY | Facility: CLINIC | Age: 42
End: 2017-06-01
Payer: COMMERCIAL

## 2017-06-01 VITALS
SYSTOLIC BLOOD PRESSURE: 104 MMHG | WEIGHT: 202.19 LBS | BODY MASS INDEX: 34.52 KG/M2 | HEIGHT: 64 IN | DIASTOLIC BLOOD PRESSURE: 80 MMHG

## 2017-06-01 DIAGNOSIS — J33.9 NASAL POLYPOSIS: ICD-10-CM

## 2017-06-01 DIAGNOSIS — K21.9 GASTROESOPHAGEAL REFLUX DISEASE WITHOUT ESOPHAGITIS: ICD-10-CM

## 2017-06-01 DIAGNOSIS — J32.9 RECURRENT SINUSITIS: ICD-10-CM

## 2017-06-01 DIAGNOSIS — H72.91 TYMPANIC MEMBRANE PERFORATION, RIGHT: ICD-10-CM

## 2017-06-01 DIAGNOSIS — D80.6 ANTI-POLYSACCHARIDE ANTIBODY DEFICIENCY: ICD-10-CM

## 2017-06-01 DIAGNOSIS — H66.90 OTITIS MEDIA, UNSPECIFIED CHRONICITY, UNSPECIFIED LATERALITY, UNSPECIFIED OTITIS MEDIA TYPE: Primary | ICD-10-CM

## 2017-06-01 PROCEDURE — 99999 PR PBB SHADOW E&M-EST. PATIENT-LVL III: CPT | Mod: PBBFAC,,, | Performed by: ALLERGY & IMMUNOLOGY

## 2017-06-01 PROCEDURE — 99214 OFFICE O/P EST MOD 30 MIN: CPT | Mod: S$GLB,,, | Performed by: ALLERGY & IMMUNOLOGY

## 2017-06-01 RX ORDER — AMOXICILLIN AND CLAVULANATE POTASSIUM 875; 125 MG/1; MG/1
1 TABLET, FILM COATED ORAL EVERY 12 HOURS
Qty: 28 TABLET | Refills: 0 | Status: SHIPPED | OUTPATIENT
Start: 2017-06-01 | End: 2017-06-15

## 2017-06-01 RX ORDER — PREDNISONE 10 MG/1
TABLET ORAL
Qty: 21 TABLET | Refills: 0 | Status: SHIPPED | OUTPATIENT
Start: 2017-06-01 | End: 2017-08-10 | Stop reason: ALTCHOICE

## 2017-06-01 NOTE — PROGRESS NOTES
Subjective:       Patient ID: Manasa Hollins is a 41 y.o. female.     4/27/17    Chief Complaint:  Otalgia (bilateral ear pain and left ear discharge)      HPI:     Pt w Sjogren's synd, anemia, GERD, AR, and specific antibody def w IgG2 def and hx recurrent sinusitis and otitis media.Hizentra started Dec '16  S/p sinus surgery 2014 (Dr. North).    Presents w 3 d hx B ear pain, worse on R, w R otorrhea. Noted sudden onset R ear pain 3d ago. No fever.  Recalls hx pre R PE tube      Past Medical History:   Diagnosis Date    Asthma     Sjogren's disease    GERD  anemia      Family History   Problem Relation Age of Onset    Cancer Mother      Lung/Cervical    Cancer Maternal Grandmother      Breast/Cervical    Cancer Other      Breast     Asthma Child     Emphysema Neg Hx     Colon cancer Neg Hx     Stomach cancer Neg Hx     Esophageal cancer Neg Hx     Ulcerative colitis Neg Hx     Crohn's disease Neg Hx     Irritable bowel syndrome Neg Hx     Celiac disease Neg Hx           Environmental History: Pets in the home: dogs (1).  [place]: hardwood floors  Tobacco Smoke in Home: yes    smokes outside    Review of Systems   Constitutional: Negative for appetite change, chills, fever and unexpected weight change.   HENT: Positive for congestion, postnasal drip and sinus pressure. Negative for ear pain and facial swelling.    Eyes: Negative for photophobia, discharge and visual disturbance.   Respiratory: Negative for cough, chest tightness, shortness of breath and wheezing.    Cardiovascular: Negative for chest pain and palpitations.   Gastrointestinal: Negative for abdominal distention.   Musculoskeletal: Negative for arthralgias and joint swelling.   Neurological: Negative for dizziness and headaches.   Hematological: Negative for adenopathy. Does not bruise/bleed easily.   Psychiatric/Behavioral: Negative for behavioral problems and sleep disturbance.        Objective:    Physical Exam    Constitutional: She is oriented to person, place, and time. She appears well-developed and well-nourished. No distress.   HENT:   Head: Normocephalic.   Right Ear: Hearing, tympanic membrane and ear canal normal.   Left Ear: Hearing, tympanic membrane and ear canal normal.   Nose: No mucosal edema, rhinorrhea, sinus tenderness or septal deviation. Right sinus exhibits no maxillary sinus tenderness and no frontal sinus tenderness. Left sinus exhibits no maxillary sinus tenderness and no frontal sinus tenderness.   Mouth/Throat: Uvula is midline, oropharynx is clear and moist and mucous membranes are normal. No uvula swelling.   R TM appears perforated  L TM dull, w serous effusion   Eyes: Conjunctivae are normal. Right eye exhibits no discharge. Left eye exhibits no discharge.   Neck: Normal range of motion. No thyromegaly present.   Cardiovascular: Normal rate and regular rhythm.    No murmur heard.  Pulmonary/Chest: Effort normal and breath sounds normal. No respiratory distress. She has no wheezes.   Abdominal: Soft. She exhibits no distension. There is no tenderness. There is no guarding.   Musculoskeletal: Normal range of motion. She exhibits no edema or tenderness.   Lymphadenopathy:     She has no cervical adenopathy.   Neurological: She is alert and oriented to person, place, and time.   Skin: Skin is warm. No rash noted. No erythema.   Psychiatric: She has a normal mood and affect. Her behavior is normal.   Nursing note and vitals reviewed.      Laboratory:      immunoCAPs pos to dust mites and cockroach  Results for BRIDGER SHERIDAN (MRN [id number]) as of 5/19/2016 13:24   Ref. Range 4/29/2016 11:30   IgG - Serum Latest Ref Range: 650 - 1600 mg/dL 4489 (H)   IgM Latest Ref Range: 50 - 300 mg/dL 50   IgA Latest Ref Range: 40 - 350 mg/dL 495 (H)   IgE Latest Ref Range: 0 - 100 IU/mL 84     Results for BRIDGER SHERIDAN (MRN [id number]) as of 9/7/2016 09:19   Ref. Range 5/3/2016 10:53   IgG 1 Latest  Ref Range: 490 - 1140 mg/dL 3,490 (H)   IgG 2 Latest Ref Range: 150 - 640 mg/dL 56 (L)   IgG 3 Latest Ref Range: 11 - 85 mg/dL 47   IgG 4 Latest Ref Range: 3 - 201 mg/dL 38     Results for BRIDGER SHERIDAN (MRN [id number]) as of 9/7/2016 09:19   Ref. Range 4/29/2016 11:30 7/2/2016 11:12 7/5/2016 09:01 8/24/2016 08:17   S.pneumoniae Type 1 Latest Units: mcg/mL <0.3  0.4 0.7   S.pneumoniae Type 3 Latest Units: mcg/mL <0.3  <0.3 3.5   Strep pneumo Type 4 Latest Units: mcg/mL <0.3  <0.3 <0.3   S.pneumoniae Type 5 Latest Units: mcg/mL <0.3  <0.3 <0.3   S.pneumoniae Type 6B Latest Units: mcg/mL <0.3  <0.3 <0.3   S.pneumoniae Type 7F Latest Units: mcg/mL 2.3  3.0 6.1   S.pneumoniae Type 8 Latest Units: mcg/mL <0.3  <0.3 <0.3   S.pneumoniae Type 9N Latest Units: mcg/mL <0.3  <0.3 0.6   S.pneumoniae Type 9V Abs Latest Units: mcg/mL <0.3  <0.3 0.3   S.pneumoniae Type 12F Latest Units: mcg/mL <0.3  <0.3 <0.3   Strep pneumo Type 14 Latest Units: mcg/mL <0.3  0.5 0.7   S.pneumoniae Type 18C Latest Units: mcg/mL <0.3  0.3 0.3   S.pneumoniae Type 19F Latest Units: mcg/mL <0.3  0.4 0.5   S.pneumoniae Type 23F Latest Units: mcg/mL <0.3  <0.3 <0.3       CT Sinuses 12/17/16    Impression:  Postsurgical changes of prior functional endoscopic sinus surgery. Left medial antrostomy is patent, but the right is now obstructed.  Significant increase in mucosal thickening or fluid opacification of paranasal sinuses as above. Hyperattenuating material within the right frontal sinus can be seen with inspissated secretions or fungal elements.     Question left-sided nasal polyposis.      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 Otitis media   3. R TM perforated    4. Recurrent sinusitis    5. Anemia of chronic disease    6. Mild persistent asthma without complication    7. Poss L nasal polyposis  8. GERD  9. Sjogren's     Plan:       1. Continue weekly SQ IgG replacement, Hizentra, 10 g SQ weekly (~435  mg/kg/mo).   2. Keep ENT appt. Abnl CT sinus, poss L sided nasal polyposis. Now also w perf R TM  3. flonase 2 sen bid  4.  xyzal  5. Cont prilosec for GERD  6. augmentin  7. Prednisone taper  FU after ENT eval

## 2017-06-01 NOTE — LETTER
June 1, 2017      [facility]  [address]  [facility] [address]  Phone: [phone]  Fax: [phone]       Patient: Manasa Hollins   YOB: 1975  Date of Visit: 06/01/2017    To Whom It May Concern:    Manasa Rubalcava was at [facility] on 06/01/2017. Please excuse patient from school on following dates: 5/30-6/1/17.  If you have any questions or concerns, or if I can be of further assistance, please do not hesitate to contact me.    Sincerely,      Elizabeth A Bosworth, LPN

## 2017-06-02 ENCOUNTER — TELEPHONE (OUTPATIENT)
Dept: ALLERGY | Facility: CLINIC | Age: 42
End: 2017-06-02

## 2017-06-02 NOTE — TELEPHONE ENCOUNTER
----- Message from Юлия Plunkett sent at 6/2/2017 10:39 AM CDT -----  Contact: Marybel/Zulma/[phone]  Zulma said that she is calling in regards to needing to get an new rx for  immun glob G,IgG,-pro-IgA 0-50 (HIZENTRA) 10 gram/50 mL (20 %) Soln along with pt's weight, allergies and a diagnosis code. Please call and advise            Thank you

## 2017-06-05 ENCOUNTER — TELEPHONE (OUTPATIENT)
Dept: ALLERGY | Facility: CLINIC | Age: 42
End: 2017-06-05

## 2017-06-08 ENCOUNTER — PATIENT MESSAGE (OUTPATIENT)
Dept: ALLERGY | Facility: CLINIC | Age: 42
End: 2017-06-08

## 2017-06-08 NOTE — TELEPHONE ENCOUNTER
Alyssa,    I faxed Ms. Hollins's new rx to [facility] on 6/5/17.  The pharmacy is saying that they never received it. Can you refax it since I wont be back at [facility] until 6/19?.  The rx is in file cabinet by your desk.    Please fax to:  [phone]    Can you let me know when its done so I can notify patient.     Thanks,    Corinne

## 2017-06-14 ENCOUNTER — TELEPHONE (OUTPATIENT)
Dept: ALLERGY | Facility: CLINIC | Age: 42
End: 2017-06-14

## 2017-06-14 ENCOUNTER — OFFICE VISIT (OUTPATIENT)
Dept: OTOLARYNGOLOGY | Facility: CLINIC | Age: 42
End: 2017-06-14
Payer: COMMERCIAL

## 2017-06-14 ENCOUNTER — CLINICAL SUPPORT (OUTPATIENT)
Dept: AUDIOLOGY | Facility: CLINIC | Age: 42
End: 2017-06-14
Payer: COMMERCIAL

## 2017-06-14 VITALS
HEART RATE: 79 BPM | DIASTOLIC BLOOD PRESSURE: 75 MMHG | BODY MASS INDEX: 35.16 KG/M2 | WEIGHT: 204.81 LBS | SYSTOLIC BLOOD PRESSURE: 111 MMHG

## 2017-06-14 DIAGNOSIS — J33.9 NASAL POLYP: ICD-10-CM

## 2017-06-14 DIAGNOSIS — H69.93 EUSTACHIAN TUBE DYSFUNCTION, BILATERAL: ICD-10-CM

## 2017-06-14 DIAGNOSIS — H90.0 CONDUCTIVE HEARING LOSS OF BOTH EARS: ICD-10-CM

## 2017-06-14 DIAGNOSIS — H65.93 OTITIS MEDIA WITH EFFUSION, BILATERAL: ICD-10-CM

## 2017-06-14 DIAGNOSIS — D83.9 CVID (COMMON VARIABLE IMMUNODEFICIENCY): ICD-10-CM

## 2017-06-14 DIAGNOSIS — H90.0 CONDUCTIVE HEARING LOSS OF BOTH EARS: Primary | ICD-10-CM

## 2017-06-14 DIAGNOSIS — M35.00 SJOGREN'S SYNDROME, WITH UNSPECIFIED ORGAN INVOLVEMENT: ICD-10-CM

## 2017-06-14 DIAGNOSIS — J34.3 NASAL TURBINATE HYPERTROPHY: ICD-10-CM

## 2017-06-14 DIAGNOSIS — J32.4 CHRONIC PANSINUSITIS: Primary | ICD-10-CM

## 2017-06-14 PROCEDURE — 99999 PR PBB SHADOW E&M-EST. PATIENT-LVL I: CPT | Mod: PBBFAC,,,

## 2017-06-14 PROCEDURE — 31231 NASAL ENDOSCOPY DX: CPT | Mod: S$GLB,,, | Performed by: OTOLARYNGOLOGY

## 2017-06-14 PROCEDURE — 99999 PR PBB SHADOW E&M-EST. PATIENT-LVL III: CPT | Mod: PBBFAC,,, | Performed by: OTOLARYNGOLOGY

## 2017-06-14 PROCEDURE — 92567 TYMPANOMETRY: CPT | Mod: S$GLB,,, | Performed by: AUDIOLOGIST

## 2017-06-14 PROCEDURE — 99244 OFF/OP CNSLTJ NEW/EST MOD 40: CPT | Mod: 25,S$GLB,, | Performed by: OTOLARYNGOLOGY

## 2017-06-14 PROCEDURE — 92557 COMPREHENSIVE HEARING TEST: CPT | Mod: S$GLB,,, | Performed by: AUDIOLOGIST

## 2017-06-14 NOTE — PROCEDURES
Nasal/sinus endoscopy  Date/Time: 6/14/2017 5:37 PM  Performed by: NITIN MANCILLA  Authorized by: NITIN MANCILLA     Consent Done?:  Yes (Verbal)  Anesthesia:     Local anesthetic:  Lidocaine 4% and Elijah-Synephrine 1/2%    Patient tolerance:  Patient tolerated the procedure well with no immediate complications  Nose:     Procedure Performed:  Nasal Endoscopy  External:      No external nasal deformity  Intranasal:      Mucosa polyps     Mucosa ulcers not present     No mucosa lesions present     Enlarged turbinates     No septum gross deformity  Nasopharynx:      No mucosa lesions     Adenoids not present     Posterior choanae patent     Eustachian tube not patent     Polypoid structure in left mid-nasal cavity.  Diffuse nasal cavity and ethmoid edema bilaterally.  Mucoid exudate diffusely.  Moderate ET torus edema.

## 2017-06-14 NOTE — PROGRESS NOTES
Subjective:      Manasa Hollins is a 41 y.o. female who was referred to me by Dr. Ivan Rosales* in consultation for sinusitis.    She relates a long history of sinus problems which culminated in surgery by Dr. North several years ago, which she recalls being quite painful.  She has more recently been seen in the allergy clinic where she has had positive tests for dust mites and also given a pneumovax for low titers.  She has also been on Hizentra for several months.  She relates a pattern of frequent sinus infections, including thick nasal discharge, facial pressure, rhinorrhea, postnasal drip and hyposmia.  She additionally has profound difficulty hearing during these exacerbations, which occur seemingly every few weeks and last for several days or longer.  She has been treated with multiple courses of antibiotics.  The ears have muffled hearing, and she has noted pus coming from the right ear at times.  She previously had some sort of a tube placed in the right ear by Dr. Rae, which has since extruded.  She uses Flonase and Xyzal, and also is currently on Augmentin.  She takes albuterol for control of asthma.  She denies prior nasal trauma other than the surgery.    SNOT-22 score = 86, NOSE score = 85%    Global QOL = 35%    Days missed = Less than 5    PTC = deferred      Past Medical History  She has a past medical history of Asthma and Sjogren's disease.    Past Surgical History  She has a past surgical history that includes  section; Myomectomy; Tubal ligation; and upper gi (2016).    Family History  Her family history includes Asthma in her child; Cancer in her maternal grandmother, mother, and other.    Social History  She reports that she has never smoked. She has never used smokeless tobacco. She reports that she does not drink alcohol or use drugs.    Allergies  She is allergic to sulfa dyne.    Medications   She has a current medication list which includes the  following prescription(s): albuterol, albuterol, amoxicillin-clavulanate 875-125mg, b complex vitamins, calcium carbonate/vitamin d3, calcium-magnesium-zinc, carboxymethylcellulose sodium, fluticasone, glycerin/propylene glycol, hydroxychloroquine, immun glob g(igg)-pro-iga 0-50, levocetirizine, linaclotide, omeprazole, pilocarpine, prednisone, prevident 5000 plus, saliva stimulant agents comb.3, saliva substitute combo no.9, sodium chloride/aloe vera, and vitamin a.    Review of Systems  Review of Systems   Constitutional: Positive for chills and fatigue. Negative for fever and unexpected weight change.   HENT: Positive for ear discharge, ear pain, facial swelling, hearing loss, postnasal drip, sinus pressure, sore throat, trouble swallowing and voice change. Negative for congestion, dental problem, hoarse voice, nosebleeds, rhinorrhea and tinnitus.    Eyes: Positive for photophobia and visual disturbance. Negative for discharge and itching.   Respiratory: Positive for cough and shortness of breath. Negative for apnea and wheezing.    Cardiovascular: Negative for chest pain and palpitations.   Gastrointestinal: Positive for nausea. Negative for abdominal pain and vomiting.   Endocrine: Positive for cold intolerance. Negative for heat intolerance.   Genitourinary: Negative for difficulty urinating.   Musculoskeletal: Positive for arthralgias and myalgias. Negative for back pain and neck pain.   Skin: Negative for rash.   Allergic/Immunologic: Positive for environmental allergies. Negative for food allergies.   Neurological: Positive for dizziness and headaches. Negative for seizures, syncope and weakness.   Hematological: Positive for adenopathy. Does not bruise/bleed easily.   Psychiatric/Behavioral: Positive for sleep disturbance. Negative for decreased concentration and dysphoric mood. The patient is not nervous/anxious.           Objective:     /75   Pulse 79   Wt 92.9 kg (204 lb 12.9 oz)   BMI 35.16  kg/m²        Constitutional:   She appears well-developed. She is cooperative. Normal speech.  No hoarse voice.      Head:  Normocephalic. Salivary glands normal.  Facial strength is normal.      Ears:    Right Ear: No drainage or tenderness. Tympanic membrane is not perforated. Tympanic membrane mobility is normal. No middle ear effusion. Decreased hearing is noted.   Left Ear: No drainage or tenderness. Tympanic membrane is not perforated. Tympanic membrane mobility is abnormal. A middle ear effusion is present. Decreased hearing is noted.   Apparent hypercompliant TM on right.  Effusion without TM mobility on left.    Nose:  Mucosal edema and rhinorrhea present. No septal deviation or polyps. No epistaxis. Turbinate hypertrophy.  Turbinates normal and no turbinate masses.  Right sinus exhibits no maxillary sinus tenderness and no frontal sinus tenderness. Left sinus exhibits no maxillary sinus tenderness and no frontal sinus tenderness.     Mouth/Throat  Oropharynx clear and moist without lesions or asymmetry and normal uvula midline. She does not have dentures. Normal dentition. No oral lesions or mucous membrane lesions. No oropharyngeal exudate or posterior oropharyngeal erythema. Mirror exam not performed due to patient tolerance.  Mirror exam not performed due to patient tolerance.      Neck:  Neck normal without thyromegaly masses, asymmetry, normal tracheal structure, crepitus, and tenderness, thyroid normal, trachea normal and no adenopathy. Normal range of motion present.     She has no cervical adenopathy.     Cardiovascular:   Regular rhythm.      Pulmonary/Chest:   Effort normal.     Psychiatric:   She has a normal mood and affect. Her speech is normal and behavior is normal.     Neurological:   No cranial nerve deficit.     Skin:   No rash noted.       Procedure    Nasal endoscopy performed.  See procedure note.     Left nasal valve     Left nasal polyp     Left ethmiod     Right nasal valve      Right ethmoid     Right ET with edema and mucus        Data Reviewed    WBC (K/uL)   Date Value   04/27/2017 5.46     Eosinophil% (%)   Date Value   04/27/2017 1.1     Eos # (K/uL)   Date Value   04/27/2017 0.1     Platelets (K/uL)   Date Value   04/27/2017 195     Glucose (mg/dL)   Date Value   04/27/2017 87     IgE (IU/mL)   Date Value   04/29/2016 84     Immunocap Class III for dust mites and cockroaches, otherwise negative.    IgG - Serum   Date Value Ref Range Status   04/29/2016 4489 (H) 650 - 1600 mg/dL Final     Comment:     IgG Cord Blood Reference Range: 650-1600 mg/dL.     IgM (mg/dL)   Date Value   04/29/2016 50     IgA (mg/dL)   Date Value   04/29/2016 495 (H)     IgG 1   Date Value Ref Range Status   05/03/2016 3,490 (H) 490 - 1140 mg/dL Final     IgG 2   Date Value Ref Range Status   05/03/2016 56 (L) 150 - 640 mg/dL Final     IgG 3   Date Value Ref Range Status   05/03/2016 47 11 - 85 mg/dL Final     IgG 4   Date Value Ref Range Status   05/03/2016 38 3 - 201 mg/dL Final     Comment:     Test performed at [facility],  [address], [place]  [address] [phone]  Joaquin Cheatham MD  - Medical Director           I independently reviewed the images of the CT sinuses dated 12/8/16. Pertinent findings include partial to complete opacification of all sinuses, worse on right, with apparent healed fracture of infero-medial left orbital wall.    I independently reviewed the tracings of the complete audiometric evaluation performed today.  I reviewed the audiogram with the patient as well.  Pertinent findings include bilateral conductive hearing loss, worse on the right, with flat tymp on right and flat far-left shift on left.           Assessment:     1. Chronic pansinusitis    2. Otitis media with effusion, bilateral    3. Nasal turbinate hypertrophy    4. Eustachian tube dysfunction, bilateral    5. Conductive hearing loss of both ears    6. CVID (common variable  immunodeficiency)    7. Nasal polyp    8. Sjogren's syndrome, with unspecified organ involvement         Plan:     I had a long discussion with the patient regarding her condition and the further workup and management options.  She would benefit from revision endoscopic sinus surgery for the treatment of her condition.  This would include all sinuses and would be bilateral.  Inferior turbinate reduction would be included.  This may also include balloon dilation of the Eustachian tube, though may need referral back to Dr. Rae for the right ear due to the severity of the conductive loss.  I discussed the risks, benefits and alternatives to surgery with the patient, as well as the expected postoperative course.  I gave her the opportunity to ask questions and I answered all of them.  I provided relevant printed information on her condition for her to review at home.  Same-day discharge is anticipated.  She may have anesthesia triage by telephone.   She will call when she decides to schedule surgery.  She will need to return for a postoperative visit 1 week after surgery.    Return for surgery.

## 2017-06-14 NOTE — TELEPHONE ENCOUNTER
----- Message from Rickey Kauffman sent at 6/14/2017  8:59 AM CDT -----  Contact: April/ Pharmacist/ [facility]/ [phone]   Company was sent the prescription for the pt from [facility] because they couldn't fill it and the company above is stating that the price of the rx will be over $7000.  The pt only has a discount card, but no prescription coverage.  They have attempted to sign the pt up for the special discount programs, but they haven't heard anything as of yet.  They spoke to the pt on Friday.  The company provided the pt with a number to a program that helps with the medications cost, but it's up to the pt to pursue.  They were just calling to inform the office.  Please call and advise.    Thank you

## 2017-06-14 NOTE — Clinical Note
Inna 15, 2017      Ivan Alejo MD  [address]  [place] [facility] [address]           [facility] - Otorhinolaryngology  [address]  [facility] [address]  Phone: [phone]  Fax: [phone]          Patient: Manasa Hollins   MR Number: [id number]   YOB: 1975   Date of Visit: 6/14/2017       Dear Dr. Ivan Alejo:    Thank you for referring Manasa Hollins to me for evaluation. Attached you will find relevant portions of my assessment and plan of care.    If you have questions, please do not hesitate to call me. I look forward to following Manasa Hollins along with you.    Sincerely,    Sawyer Bernardo MD    Enclosure  CC:  No Recipients    If you would like to receive this communication electronically, please contact [email or web address] or [phone] to request more information on [employer] Link access.    For providers and/or their staff who would like to refer a patient to [facility], please contact us through our one-stop-shop provider referral line, [facility] [place], at [phone].    If you feel you have received this communication in error or would no longer like to receive these types of communications, please e-mail [email or web address]

## 2017-06-14 NOTE — LETTER
Inna 15, 2017    Ivan Alejo MD  [address] [place] [address]           OTOLARYNGOLOGY AND COMMUNICATION SCIENCES    Yemi Araiza MD, FACS          SURGICAL AND MEDICAL DISEASES OF HEAD AND NECK  MD Yemi Funez MD, FACS  Michael Strange III, MD    OTOLOGY, NEUROTOLOGY and SKULL-BASE SURGERY  Cecilio Cheek MD, FACS  Ramon Rae MD, Director    PEDIATRIC OTOLARYNGOLOGY & OTOLOGY  NIC Crum MD, FAAP  Piyush Morocho MD, FACS    FACIAL PLASTIC and RECONSTRUCTIVE SURGERY  ERLINDA Do III, MD, FACS    RHINOLOGY and SINUS SURGERY  Sawyer Bernardo MD, MPH, FACS  Director   ERLINDA Do III, MD, FACS    LARYNGOLOGY  Rojas Chauhan MD    SPEECH-LANGUAGE PATHOLOGY  Edna Caballero, PhD, [facility]-SLP  Deirdre Rogel, MS, CCC-SLP  Tara Delgado, MS, CCC-SLP  Nathalia Alvarez MA, [facility]-SLP    AUDIOLOGY SECTION  Janet Summers, MS, CCC-A  CARLOS Harrington, CCC-A  Gini Bacon, CCC-A  Gini James, CCC-A  Kai Horta Jr., CARLOS, CCA-A  CARLOS Hidalgo, CCC-A  Gini Tan, CCC-A    ADVANCED PRACTICE CLINICIANS  Head and Neck Surgical Oncology  MICHEL Gary, FNP-C  Pedatric Otolaryngology  MICHEL Henry, PNP-C       Re:  Manasa Hollins  :  1975    Dear Dr. Alejo,    Thank you for referring your patient, Manasa Hollins, to us for evaluation and treatment.  I have enclosed a copy of my clinic note for your review and records.  If you have any questions please do not hesitate to contact our office.     Thank you for allowing me to participate in the care of your patient.    Sincerely,        Sawyer Bernardo MD, MPH, FACS    Director, Rhinology and Sinus Surgery  Department of Otorhinolaryngology  [facility]    Encl:  Progress note       [facility]    [address] [place] [address]  phone [phone]  fax  [phone]  [email or web address]

## 2017-07-06 ENCOUNTER — TELEPHONE (OUTPATIENT)
Dept: ALLERGY | Facility: CLINIC | Age: 42
End: 2017-07-06

## 2017-07-13 ENCOUNTER — PATIENT MESSAGE (OUTPATIENT)
Dept: ALLERGY | Facility: CLINIC | Age: 42
End: 2017-07-13

## 2017-07-26 ENCOUNTER — TELEPHONE (OUTPATIENT)
Dept: OTOLARYNGOLOGY | Facility: CLINIC | Age: 42
End: 2017-07-26

## 2017-07-26 DIAGNOSIS — J34.3 NASAL TURBINATE HYPERTROPHY: Primary | ICD-10-CM

## 2017-07-26 DIAGNOSIS — H90.0 CONDUCTIVE HEARING LOSS OF BOTH EARS: ICD-10-CM

## 2017-07-26 DIAGNOSIS — D83.9 CVID (COMMON VARIABLE IMMUNODEFICIENCY): ICD-10-CM

## 2017-07-26 DIAGNOSIS — H65.93 OTITIS MEDIA WITH EFFUSION, BILATERAL: ICD-10-CM

## 2017-07-26 DIAGNOSIS — M35.00 SJOGREN'S SYNDROME, WITH UNSPECIFIED ORGAN INVOLVEMENT: ICD-10-CM

## 2017-07-26 DIAGNOSIS — H69.93 EUSTACHIAN TUBE DYSFUNCTION, BILATERAL: ICD-10-CM

## 2017-07-26 DIAGNOSIS — J33.9 NASAL POLYP: ICD-10-CM

## 2017-07-26 DIAGNOSIS — J32.4 CHRONIC PANSINUSITIS: ICD-10-CM

## 2017-07-31 ENCOUNTER — TELEPHONE (OUTPATIENT)
Dept: ALLERGY | Facility: CLINIC | Age: 42
End: 2017-07-31

## 2017-07-31 NOTE — TELEPHONE ENCOUNTER
Received a fax from [employer] for Hizentra. Placed in Yellow folder for Dr. Alejo to review and sign.

## 2017-07-31 NOTE — TELEPHONE ENCOUNTER
Received a fax from [employer] 10grams/50 ml vial was approved from 7/28/17 through 7/27/2018    PA number [id number]    # [phone]  Address: [address] [address]    Faxed placed in patient folder.

## 2017-08-10 ENCOUNTER — OFFICE VISIT (OUTPATIENT)
Dept: RHEUMATOLOGY | Facility: CLINIC | Age: 42
End: 2017-08-10
Payer: COMMERCIAL

## 2017-08-10 VITALS
SYSTOLIC BLOOD PRESSURE: 99 MMHG | DIASTOLIC BLOOD PRESSURE: 68 MMHG | WEIGHT: 206.38 LBS | HEART RATE: 76 BPM | BODY MASS INDEX: 35.23 KG/M2 | HEIGHT: 64 IN

## 2017-08-10 DIAGNOSIS — Z79.899 LONG-TERM USE OF HYDROXYCHLOROQUINE: ICD-10-CM

## 2017-08-10 DIAGNOSIS — G47.00 PERSISTENT INSOMNIA: ICD-10-CM

## 2017-08-10 DIAGNOSIS — K11.1 PAROTID GLAND ENLARGEMENT: ICD-10-CM

## 2017-08-10 DIAGNOSIS — Z55.9 EDUCATIONAL CIRCUMSTANCE: ICD-10-CM

## 2017-08-10 DIAGNOSIS — D57.3 SICKLE CELL TRAIT: ICD-10-CM

## 2017-08-10 DIAGNOSIS — M35.09 SJOGREN'S SYNDROME WITH OTHER ORGAN INVOLVEMENT: Primary | ICD-10-CM

## 2017-08-10 DIAGNOSIS — D80.3 IGG2 DEFICIENCY: ICD-10-CM

## 2017-08-10 PROCEDURE — 3008F BODY MASS INDEX DOCD: CPT | Mod: S$GLB,,, | Performed by: INTERNAL MEDICINE

## 2017-08-10 PROCEDURE — 99999 PR PBB SHADOW E&M-EST. PATIENT-LVL III: CPT | Mod: PBBFAC,,, | Performed by: INTERNAL MEDICINE

## 2017-08-10 PROCEDURE — 99214 OFFICE O/P EST MOD 30 MIN: CPT | Mod: S$GLB,,, | Performed by: INTERNAL MEDICINE

## 2017-08-10 SDOH — SOCIAL DETERMINANTS OF HEALTH (SDOH): PROBLEMS RELATED TO EDUCATION AND LITERACY, UNSPECIFIED: Z55.9

## 2017-08-10 ASSESSMENT — ROUTINE ASSESSMENT OF PATIENT INDEX DATA (RAPID3)
PATIENT GLOBAL ASSESSMENT SCORE: 5
FATIGUE SCORE: 10
WHEN YOU AWAKENED IN THE MORNING OVER THE LAST WEEK, PLEASE INDICATE THE AMOUNT OF TIME IT TAKES UNTIL YOU ARE AS LIMBER AS YOU WILL BE FOR THE DAY: 15 MINUTES
MDHAQ FUNCTION SCORE: .1
AM STIFFNESS SCORE: 1, YES
PSYCHOLOGICAL DISTRESS SCORE: 4.4
PAIN SCORE: 5
TOTAL RAPID3 SCORE: 3.44

## 2017-08-10 NOTE — PATIENT INSTRUCTIONS
You can try increasing melatonin up to 20 mg at night.    You can also try Sleepy Time Tea Extra     If you still can't sleep discuss with primary care MD: perhaps trazodone at bedtime would help--beginning with 50 mg and increasing as needed up to   150 mg      Continue daily exercise.    [facility]

## 2017-08-10 NOTE — PROGRESS NOTES
Subjective:       Patient ID: Manasa Hollins is a 41 y.o. female with Sjogren's syndrome.     Chief Complaint: Disease Management    Returns for follow-up. Last seen on 3/28/17. Has Sjogren's with + serology & marked hypergammaglobulinemia but with IgG2 deficiency and recurrent ear & sinus infections. She was prescribed Hizentra (immune globulin) SC and started it at the end of December, 2016.  She also has anemia, most likely of chronic disease. She has been seen by hematology who felt anemia was due to chronic inflammation with associated abnormal utilization of iron.    Sjogren's wise today she states she is doing very well. She is still on  HCQ and has eye exams and pilocarpine which she supplements with OTC [employer]Tek OraMoist patches at night. She had an episode of parotid gland swelling which she reduced by massage. .0She uses OTC drops for dry eyes. (She is followed by outside ophthalmologist for both Sjogren's & HCQ use (400 mg/d). Was told she does not have eye staining.) Swallowing is ok. AM stiffness x 15 minutes,. Denies joint pain except still for infrequent  bilateral knee pain at times associated with doing too much which she now puts hot wax on and this helps; denies joint swelling, Raynaud's, tight skin, alopecia, oral ulcers, pericarditis, photosensitivity, skin rashes, miscarriages (but had one premature birth at 26.5 weeks) & thromboses.    Her main problem in non restorative sleep and severe fatigue. Is using melatonin up to 6 mg and doesn't seem to help much. Asking what to do about it.  Has begun exercising daily with her  (walking fast) and it does make her feel better. Also trying to lose weight but finds it difficult. Admits to anxiety and stress. Worries about everything.     Pertinent hx from previous visit:   40 yr old lady with chronic sinusitis with dry nose, mouth and & dry eyes (keratitis documented-photo displayed on 10/12/15 visit) initially sent to rule out Sjogren's  after she requested testing and was found to have a +MANISHA 1:250 sp with +SSA & +SSB. Found to have non tender parotid gland enlargement.  She was previously begun on pilocarpine 5 mg qid  Sxs remain the same--she minimizes them. She did not check with pulmonary as she was admitted in November with fever and bilateral otomastoiditis. She is now improved. Gets does get frequent dental cleaning              She reports no joint swelling. Associated symptoms include fatigue and headaches. Pertinent negatives include no fever, trouble swallowing or myalgias.          Current Outpatient Prescriptions   Medication Sig Dispense Refill    albuterol (PROAIR HFA) 90 mcg/actuation inhaler INHALE 2 PUFFS INTO THE LUNGS EVERY 4 HOURS AS NEEDED 8.5 g 11    albuterol 90 mcg/actuation inhaler Inhale 2 puffs into the lungs every 4 (four) hours as needed for Wheezing or Shortness of Breath. 1 Inhaler 3    b complex vitamins tablet Take 1 tablet by mouth once daily.      CALCIUM CARBONATE/VITAMIN D3 (VITAMIN D-3 ORAL) Take 4,000 Units by mouth once daily.      calcium-magnesium-zinc 333-133-5 mg Tab Take 1 tablet by mouth once daily.      CARBOXYMETHYLCELLULOSE SODIUM (REFRESH TEARS OPHT) Apply to eye.      fluticasone (FLONASE) 50 mcg/actuation nasal spray 2 sprays left nostril BID; 2 sprays right nostril once a day 1 Bottle 3    GLYCERIN/PROPYLENE GLYCOL (SOOTHE LUBRICANT OPHT) Apply to eye.      hydroxychloroquine (PLAQUENIL) 200 mg tablet Take 2 tablets (400 mg total) by mouth once daily. 120 tablet 3    immun glob G,IgG,-pro-IgA 0-50 (HIZENTRA) 10 gram/50 mL (20 %) Soln Inject 10 g into the skin once a week. 4 vial 11    levocetirizine (XYZAL) 5 MG tablet TAKE 1 TABLET BY MOUTH EVERY EVENING 30 tablet 1    linaclotide (LINZESS) 145 mcg Cap capsule Take 1 capsule (145 mcg total) by mouth once daily. 30 capsule 11    omeprazole (PRILOSEC) 40 MG capsule TAKE 1 CAPSULE (40 MG TOTAL) BY MOUTH EVERY MORNING. 90 capsule 3     pilocarpine (SALAGEN) 5 MG Tab Take 1 tablet (5 mg total) by mouth 4 (four) times daily. 360 tablet 3    PREVIDENT 5000 PLUS 1.1 % Crea       saliva stimulant agents comb.3 (BIOTENE MOISTURIZING MOUTH) Spry by Mucous Membrane route.      SALIVA SUBSTITUTION COMBO NO.9 (BIOTENE DRY MOUTH ORAL RINSE MM) by Mucous Membrane route.      UNABLE TO FIND Kavin red vitamins      vitamin A 8000 UNIT capsule Take 8,000 Units by mouth once daily.      SODIUM CHLORIDE/ALOE VERA (AYR SALINE GEL NASL) by Nasal route.       No current facility-administered medications for this visit.          Review of Systems   Constitutional: Positive for fatigue. Negative for diaphoresis and fever.   HENT: Negative for mouth sores, sore throat, tinnitus and trouble swallowing.         Dry mouth   Eyes: Positive for redness. Negative for visual disturbance.        Dry eyes   Respiratory: Positive for cough and shortness of breath (warm weather makes her pant; maybe allergic to oak trees). Negative for choking and chest tightness.    Cardiovascular: Negative for palpitations and leg swelling.   Gastrointestinal: Positive for constipation (helped by linzess). Negative for abdominal distention, abdominal pain, blood in stool, diarrhea, nausea and vomiting.        Heartburn   Endocrine: Positive for cold intolerance.   Genitourinary: Negative.  Negative for frequency, hematuria and menstrual problem.   Musculoskeletal: Positive for arthralgias (knees without swelling.). Negative for back pain, joint swelling, myalgias, neck pain and neck stiffness.   Skin: Positive for rash (with Hizentra).   Allergic/Immunologic: Negative.    Neurological: Positive for headaches. Negative for dizziness, syncope, weakness, light-headedness and numbness.   Hematological: Negative.  Negative for adenopathy. Does not bruise/bleed easily.   Psychiatric/Behavioral: Positive for sleep disturbance. Negative for dysphoric mood. The patient is nervous/anxious.       "    Review of patient's allergies indicates:   Allergen Reactions    Sulfa dyne      Other reaction(s): CAUSES ASTHMA ATTACK         Past Surgical History:   Procedure Laterality Date     SECTION      x4    MYOMECTOMY      TUBAL LIGATION      done during myometomy surgery.    upper gi  2016         Family History   Problem Relation Age of Onset    Cancer Mother      Lung/Cervical    Cancer Maternal Grandmother      Breast/Cervical    Cancer Other      Breast     Asthma Child     Emphysema Neg Hx     Colon cancer Neg Hx     Stomach cancer Neg Hx     Esophageal cancer Neg Hx     Ulcerative colitis Neg Hx     Crohn's disease Neg Hx     Irritable bowel syndrome Neg Hx     Celiac disease Neg Hx    Mom & Dad both had sickle cell trait.  Mom  with lung cancer (non smoker) & had cervical cancer and another cancer as well.    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   Objective:     BP 99/68   Pulse 76   Ht 5' 4" (1.626 m)   Wt 93.6 kg (206 lb 6.4 oz)   BMI 35.43 kg/m²    Was 208 lbs 14.4 oz on 3/28/17  Was 198 lbs 12.8 oz on 16.  Was 191 lbs 6.4 oz on 16.   Physical Exam   Vitals reviewed.  Constitutional: She is oriented to person, place, and time and well-developed, well-nourished, and in no distress. No distress.   HENT:   Head: Normocephalic and atraumatic.   Mouth/Throat: Oropharynx is clear and moist. No oropharyngeal exudate.   No facial rashes  Parotids minimally enlarged &, non tender  Lacrimals minimally enlarged  Decreased  moisture of oral mucosa   No oral ulcers  Teeth in good repair   Eyes: Conjunctivae and EOM are normal. Pupils are equal, round, and reactive to light. Right eye exhibits no discharge. Left eye exhibits no discharge. No scleral " icterus.   Neck: Neck supple. No JVD present. No tracheal deviation present. No thyromegaly present.   Cardiovascular: Normal rate, regular rhythm, normal heart sounds and intact distal pulses.  Exam reveals no gallop and no friction rub.    No murmur heard.  Pulmonary/Chest: Breath sounds normal. No respiratory distress. She has no wheezes. She has no rales. She exhibits no tenderness.   Abdominal: Soft. Bowel sounds are normal. She exhibits no distension and no mass. There is no splenomegaly or hepatomegaly. There is no tenderness. There is no rebound and no guarding.   Lymphadenopathy:     She has no cervical adenopathy.        Right: No inguinal adenopathy present.        Left: No inguinal adenopathy present.   Neurological: She is alert and oriented to person, place, and time. She has normal reflexes. No cranial nerve deficit. Gait normal.   Proximal and distal muscle strength 5/5 except for 4+/5 proximal LE; can squat but cannot get up without falling.   Skin: Skin is warm and dry. No rash noted. She is not diaphoretic.     Psychiatric: Mood, memory, affect and judgment normal.   Musculoskeletal: Normal range of motion. She exhibits no edema or tenderness.   Cspine FROM no tenderness  Tspine FROM no tenderness  Lspine FROM no tenderness.  TMJ: unremarkable  Shoulders: FROM; no synovitis;  Elbows: FROM; no synovitis; no tophi or nodules  Wrists: FROM; no synovitis;    MCPs: FROM; Mild enlargement of 1st MCP on left;no synovitis; no metacarpalgia;  ok;  PIPs: FROM; no synovitis; no tenderness of any joints.   DIPs: FROM; no synovitis;   HIPS: FROM  Knees: FROM; no crepitus; no synovitis; no instability;  Ankles: FROM: no synovitis   Toes: ok; no metatarsalgia             LABS:  4/27/17: ; CRP 4.6; Hg 9.4; Ht 28.9; Na 133; TP 10.1; Alb. 2.6;   12/21/16: Hg 9.8; Ht 29.6; Na 135; Alb. 2.9;   8/24/16: ; CRP 3.9; Hg 8.2; Ht 25.2; CMP TP 9.4; Alb. 2.4; Ca 8.4; CPK 88; SPE elevated TP & gammaglobulins  "with oligoclonal bands.   16: ;   5/3/16: Hg 8.9; Ht 27.5; IgG1 3490 (1140); IgG2 56 (150);  16: ; CRP 6.7; Hg 9.1; Ht 28.7; CMP Alb. 2.8; TP 9.4; UA ok; U pr/cnne .37 (.20)  3/11/16: CMP Ca 8.2; Alb. 1.4  3/7/16: UA 3+ pr; 2+ OB; * RBCs 16 WBC, 9 HC  12/1/15: Hg 7.3; Ht 22.5; K+ 3.4; Ca 8.6;   8/18/15: UA ok;  8/12/15: LAC neg; aCLA neg; C3, C4 ok; dsDNA neg;       Imagin16: Bilateral knee x-rays: personally reviewed: very mild bilateral DJD.  16: CT chest: personally reviewed: unremarkable.  16: CXR: personally reviewed: prob. Ok; RLL opacity c/w scarring  10/12/15: CXR: personally reviewed with patient: OK  10/12/15: Bilateral Hands: personally reviewed with patient: ok  Assessment:   Sjogren's syndrome -- stable/improved    Sicca sxs with dry mouth with dental carries, dry eyes & dry mouth     Response to pilocarpine      But cough & mild SOB following--patient opts to continue    Bilateral parotid gland swelling    +MANISHA 1:2560; +SSA; +SSB: TP 10.1;     RF neg; LAC, aCLA neg (premature birth at 26.5 wks).     C3, C4, UA ok; elevated IgG 4489 (1600); IgA 495 (350);     + FH SLE (sister)    Non restorative sleep associated with fatigue & features of FMS    Bilateral knee pain/weakness--stable   Imaging with minimal bilateral OA      Recurrent ENT & possibly pulmonary infections associated with IgG2 deficiency   S/P bilateral otomastoiditis with fever hospitalized 11/15    Possibly parotidomegaly played a role.   Pulmonary issues:     S/P CAP 3/17/16:     Also with chronic cough & SOB     She's had "allergic cough" in past.     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  Continue pilocarpine 5 mg qid  Continue paraffin wax, OTC [employer]Tek Ora Moist Patches, etc.   Suggestions made for insomnia; melatonin; Sleepy Time Tea Extra; possible use of trazodone;   discussed fibromyalgia and provided written information as the patient has features of this and can benefit by addressing it.   If lifestyle modifications and above don't help much, may ultimately benefit from duloxetine.   Daily aerobic exercise_--continue and escalate  Counseled on weight loss--again.  RTC 6 months or prn.            CC: Tomi Myers MD

## 2017-08-14 DIAGNOSIS — K11.1 PAROTID GLAND ENLARGEMENT: ICD-10-CM

## 2017-08-14 RX ORDER — HYDROXYCHLOROQUINE SULFATE 200 MG/1
400 TABLET, FILM COATED ORAL DAILY
Qty: 120 TABLET | Refills: 3 | Status: SHIPPED | OUTPATIENT
Start: 2017-08-14 | End: 2017-08-17 | Stop reason: SDUPTHER

## 2017-08-15 ENCOUNTER — TELEPHONE (OUTPATIENT)
Dept: OTOLARYNGOLOGY | Facility: CLINIC | Age: 42
End: 2017-08-15

## 2017-08-15 NOTE — TELEPHONE ENCOUNTER
----- Message from Lakshmi Farfan RN sent at 8/15/2017  9:28 AM CDT -----  See below.  Loreta  ----- Message -----  From: Lorena Naidu MA  Sent: 8/15/2017   8:48 AM  To: Lakshmi Farfan RN, Sawyer Bernardo MD        ----- Message -----  From: Felicity Luz  Sent: 8/15/2017   8:35 AM  To: Tova Camargo    y  pt surgery for 8/25/17 was denied for the unlisted code 65130 due to insufficient evidence but a peer to peer can be obtained by calling [phone] using ref#[id number] talk to Ana.    Message was left on v/m with office #  for Ana to call back.

## 2017-08-17 ENCOUNTER — TELEPHONE (OUTPATIENT)
Dept: RHEUMATOLOGY | Facility: CLINIC | Age: 42
End: 2017-08-17

## 2017-08-17 DIAGNOSIS — K11.1 PAROTID GLAND ENLARGEMENT: ICD-10-CM

## 2017-08-17 RX ORDER — HYDROXYCHLOROQUINE SULFATE 200 MG/1
400 TABLET, FILM COATED ORAL DAILY
Qty: 120 TABLET | Refills: 3 | Status: SHIPPED | OUTPATIENT
Start: 2017-08-17 | End: 2018-02-08 | Stop reason: SDUPTHER

## 2017-08-17 NOTE — TELEPHONE ENCOUNTER
Requesting 90 day of hydroxychloroquine to be sent to [facility]-her insurance is requesting    Done

## 2017-08-24 ENCOUNTER — TELEPHONE (OUTPATIENT)
Dept: OTOLARYNGOLOGY | Facility: CLINIC | Age: 42
End: 2017-08-24

## 2017-08-24 ENCOUNTER — ANESTHESIA EVENT (OUTPATIENT)
Dept: SURGERY | Facility: HOSPITAL | Age: 42
End: 2017-08-24
Payer: COMMERCIAL

## 2017-08-24 RX ORDER — ACETAMINOPHEN 500 MG
5000 TABLET ORAL EVERY MORNING
COMMUNITY
End: 2019-03-28

## 2017-08-24 NOTE — PRE-PROCEDURE INSTRUCTIONS
"Spoke with Patient.  NPO, medication, and pre-op instructions reviewed.  Medications verified against her medicine bottles.  Stated that she is "hard to sedate" and has woken up during 2 surgeries 6-11-14 and an earlier surgery.  She is unsure of the name of the medication that she is allergic to.  Verbalized understanding of instructions.    "

## 2017-08-24 NOTE — ANESTHESIA PREPROCEDURE EVALUATION
08/24/2017  Manasa Hollins is a 41 y.o., female.    Anesthesia Evaluation         Review of Systems  Anesthesia Hx:  Hx of Anesthetic complications Difficult to sedate and awareness history.    Hematology/Oncology:         -- Anemia:   Cardiovascular:  Cardiovascular Normal  ECG has been reviewed.    Pulmonary:   Asthma mild Denies Shortness of breath. Current dry cough, no sputum or fevers   Hepatic/GI:   GERD, well controlled    Musculoskeletal:   Sjogrens    Endocrine:  Endocrine Normal        Physical Exam  General:  Well nourished    Airway/Jaw/Neck:  Airway Findings: Mouth Opening: Normal Tongue: Normal  General Airway Assessment: Adult  Mallampati: III  Improves to II with phonation.  TM Distance: 4 - 6 cm  Jaw/Neck Findings:     Neck ROM: Normal ROM      Dental:  Dental Findings: In tact   Chest/Lungs:  Chest/Lungs Findings: Clear to auscultation, Normal Respiratory Rate     Heart/Vascular:  Heart Findings: Rate: Normal  Rhythm: Regular Rhythm  Sounds: Normal        Mental Status:  Mental Status Findings:  Cooperative, Alert and Oriented         Anesthesia Plan  Type of Anesthesia, risks & benefits discussed:  Anesthesia Type:  general  Patient's Preference:   Intra-op Monitoring Plan: standard ASA monitors  Intra-op Monitoring Plan Comments:   Post Op Pain Control Plan: multimodal analgesia, IV/PO Opioids PRN and per primary service following discharge from PACU  Post Op Pain Control Plan Comments:   Induction:   IV  Beta Blocker:  Patient is not currently on a Beta-Blocker (No further documentation required).       Informed Consent: Patient understands risks and agrees with Anesthesia plan.  Questions answered. Anesthesia consent signed with patient.  ASA Score: 2     Day of Surgery Review of History & Physical:        Anesthesia Plan Notes: Plan to use TIVA & BIS monitor.         Ready For  "Surgery From Anesthesia Perspective.    Stated that she is "hard to sedate" and has woken up during 2 surgeries 6-11-14 and an earlier surgery.    "

## 2017-08-25 ENCOUNTER — ANESTHESIA (OUTPATIENT)
Dept: SURGERY | Facility: HOSPITAL | Age: 42
End: 2017-08-25
Payer: COMMERCIAL

## 2017-08-25 ENCOUNTER — HOSPITAL ENCOUNTER (OUTPATIENT)
Facility: HOSPITAL | Age: 42
Discharge: HOME | End: 2017-08-25
Attending: OTOLARYNGOLOGY | Admitting: OTOLARYNGOLOGY
Payer: COMMERCIAL

## 2017-08-25 ENCOUNTER — SURGERY (OUTPATIENT)
Age: 42
End: 2017-08-25

## 2017-08-25 VITALS
HEIGHT: 63 IN | RESPIRATION RATE: 20 BRPM | SYSTOLIC BLOOD PRESSURE: 126 MMHG | OXYGEN SATURATION: 100 % | BODY MASS INDEX: 36.5 KG/M2 | WEIGHT: 206 LBS | TEMPERATURE: 98 F | HEART RATE: 74 BPM | DIASTOLIC BLOOD PRESSURE: 75 MMHG

## 2017-08-25 DIAGNOSIS — J34.3 NASAL TURBINATE HYPERTROPHY: ICD-10-CM

## 2017-08-25 DIAGNOSIS — J35.2 ADENOID HYPERTROPHY: ICD-10-CM

## 2017-08-25 DIAGNOSIS — J32.4 CHRONIC PANSINUSITIS: ICD-10-CM

## 2017-08-25 DIAGNOSIS — J32.9 SINUSITIS: Primary | ICD-10-CM

## 2017-08-25 LAB
B-HCG UR QL: NEGATIVE
CTP QC/QA: YES

## 2017-08-25 PROCEDURE — 88305 TISSUE EXAM BY PATHOLOGIST: CPT | Performed by: PATHOLOGY

## 2017-08-25 PROCEDURE — 30140 RESECT INFERIOR TURBINATE: CPT | Mod: 50,51,, | Performed by: OTOLARYNGOLOGY

## 2017-08-25 PROCEDURE — 27100025 HC TUBING, SET FLUID WARMER: Performed by: REGISTERED NURSE

## 2017-08-25 PROCEDURE — 37000008 HC ANESTHESIA 1ST 15 MINUTES: Performed by: OTOLARYNGOLOGY

## 2017-08-25 PROCEDURE — 31276 NSL/SINS NDSC FRNT TISS RMVL: CPT | Mod: 50,,, | Performed by: OTOLARYNGOLOGY

## 2017-08-25 PROCEDURE — C1726 CATH, BAL DIL, NON-VASCULAR: HCPCS | Performed by: OTOLARYNGOLOGY

## 2017-08-25 PROCEDURE — 31287 NASAL/SINUS ENDOSCOPY SURG: CPT | Mod: 50,51,, | Performed by: OTOLARYNGOLOGY

## 2017-08-25 PROCEDURE — 25000003 PHARM REV CODE 250: Performed by: OTOLARYNGOLOGY

## 2017-08-25 PROCEDURE — 94760 N-INVAS EAR/PLS OXIMETRY 1: CPT

## 2017-08-25 PROCEDURE — 27800903 OPTIME MED/SURG SUP & DEVICES OTHER IMPLANTS: Performed by: OTOLARYNGOLOGY

## 2017-08-25 PROCEDURE — D9220A PRA ANESTHESIA: Mod: ANES,,, | Performed by: ANESTHESIOLOGY

## 2017-08-25 PROCEDURE — 25000003 PHARM REV CODE 250: Performed by: REGISTERED NURSE

## 2017-08-25 PROCEDURE — 36000710: Performed by: OTOLARYNGOLOGY

## 2017-08-25 PROCEDURE — 31255 NSL/SINS NDSC W/TOT ETHMDCT: CPT | Mod: 50,51,, | Performed by: OTOLARYNGOLOGY

## 2017-08-25 PROCEDURE — 71000016 HC POSTOP RECOV ADDL HR: Performed by: OTOLARYNGOLOGY

## 2017-08-25 PROCEDURE — 27201423 OPTIME MED/SURG SUP & DEVICES STERILE SUPPLY: Performed by: OTOLARYNGOLOGY

## 2017-08-25 PROCEDURE — 63600175 PHARM REV CODE 636 W HCPCS: Performed by: OTOLARYNGOLOGY

## 2017-08-25 PROCEDURE — 71000015 HC POSTOP RECOV 1ST HR: Performed by: OTOLARYNGOLOGY

## 2017-08-25 PROCEDURE — 31256 EXPLORATION MAXILLARY SINUS: CPT | Mod: 50,51,, | Performed by: OTOLARYNGOLOGY

## 2017-08-25 PROCEDURE — 27000221 HC OXYGEN, UP TO 24 HOURS

## 2017-08-25 PROCEDURE — S0028 INJECTION, FAMOTIDINE, 20 MG: HCPCS | Performed by: REGISTERED NURSE

## 2017-08-25 PROCEDURE — 37000009 HC ANESTHESIA EA ADD 15 MINS: Performed by: OTOLARYNGOLOGY

## 2017-08-25 PROCEDURE — 61782 SCAN PROC CRANIAL EXTRA: CPT | Mod: ,,, | Performed by: OTOLARYNGOLOGY

## 2017-08-25 PROCEDURE — D9220A PRA ANESTHESIA: Mod: CRNA,,, | Performed by: REGISTERED NURSE

## 2017-08-25 PROCEDURE — 25000003 PHARM REV CODE 250: Performed by: ANESTHESIOLOGY

## 2017-08-25 PROCEDURE — 81025 URINE PREGNANCY TEST: CPT | Performed by: OTOLARYNGOLOGY

## 2017-08-25 PROCEDURE — 88305 TISSUE EXAM BY PATHOLOGIST: CPT | Mod: 26,,, | Performed by: PATHOLOGY

## 2017-08-25 PROCEDURE — 71000033 HC RECOVERY, INTIAL HOUR: Performed by: OTOLARYNGOLOGY

## 2017-08-25 PROCEDURE — 88311 DECALCIFY TISSUE: CPT | Mod: 26,,, | Performed by: PATHOLOGY

## 2017-08-25 PROCEDURE — 36000711: Performed by: OTOLARYNGOLOGY

## 2017-08-25 PROCEDURE — 63600175 PHARM REV CODE 636 W HCPCS: Performed by: REGISTERED NURSE

## 2017-08-25 PROCEDURE — 71000039 HC RECOVERY, EACH ADD'L HOUR: Performed by: OTOLARYNGOLOGY

## 2017-08-25 PROCEDURE — 42831 REMOVAL OF ADENOIDS: CPT | Mod: 51,,, | Performed by: OTOLARYNGOLOGY

## 2017-08-25 DEVICE — IMPLANT PROPEL MOMETASONE: Type: IMPLANTABLE DEVICE | Site: NOSE | Status: FUNCTIONAL

## 2017-08-25 RX ORDER — KETAMINE HYDROCHLORIDE 100 MG/ML
INJECTION, SOLUTION INTRAMUSCULAR; INTRAVENOUS
Status: DISCONTINUED | OUTPATIENT
Start: 2017-08-25 | End: 2017-08-25

## 2017-08-25 RX ORDER — EPINEPHRINE CONVENIENCE KIT 1 MG/ML(1)
KIT INTRAMUSCULAR; SUBCUTANEOUS
Status: DISCONTINUED | OUTPATIENT
Start: 2017-08-25 | End: 2017-08-25 | Stop reason: HOSPADM

## 2017-08-25 RX ORDER — OXYCODONE AND ACETAMINOPHEN 5; 325 MG/1; MG/1
1 TABLET ORAL EVERY 6 HOURS PRN
Qty: 40 TABLET | Refills: 0 | Status: SHIPPED | OUTPATIENT
Start: 2017-08-25 | End: 2017-08-31

## 2017-08-25 RX ORDER — PROPOFOL 10 MG/ML
VIAL (ML) INTRAVENOUS CONTINUOUS PRN
Status: DISCONTINUED | OUTPATIENT
Start: 2017-08-25 | End: 2017-08-25

## 2017-08-25 RX ORDER — ROCURONIUM BROMIDE 10 MG/ML
INJECTION, SOLUTION INTRAVENOUS
Status: DISCONTINUED | OUTPATIENT
Start: 2017-08-25 | End: 2017-08-25

## 2017-08-25 RX ORDER — DEXAMETHASONE SODIUM PHOSPHATE 4 MG/ML
INJECTION, SOLUTION INTRA-ARTICULAR; INTRALESIONAL; INTRAMUSCULAR; INTRAVENOUS; SOFT TISSUE
Status: DISCONTINUED | OUTPATIENT
Start: 2017-08-25 | End: 2017-08-25

## 2017-08-25 RX ORDER — MIDAZOLAM HYDROCHLORIDE 1 MG/ML
INJECTION, SOLUTION INTRAMUSCULAR; INTRAVENOUS
Status: DISCONTINUED | OUTPATIENT
Start: 2017-08-25 | End: 2017-08-25

## 2017-08-25 RX ORDER — CEFAZOLIN SODIUM 2 G/50ML
2 SOLUTION INTRAVENOUS ONCE
Status: COMPLETED | OUTPATIENT
Start: 2017-08-25 | End: 2017-08-25

## 2017-08-25 RX ORDER — FENTANYL CITRATE 50 UG/ML
INJECTION, SOLUTION INTRAMUSCULAR; INTRAVENOUS
Status: DISCONTINUED | OUTPATIENT
Start: 2017-08-25 | End: 2017-08-25

## 2017-08-25 RX ORDER — OXYCODONE AND ACETAMINOPHEN 5; 325 MG/1; MG/1
1 TABLET ORAL EVERY 6 HOURS PRN
Status: DISCONTINUED | OUTPATIENT
Start: 2017-08-25 | End: 2017-08-25 | Stop reason: HOSPADM

## 2017-08-25 RX ORDER — SODIUM CHLORIDE 9 MG/ML
INJECTION, SOLUTION INTRAVENOUS CONTINUOUS
Status: DISCONTINUED | OUTPATIENT
Start: 2017-08-25 | End: 2017-08-25 | Stop reason: HOSPADM

## 2017-08-25 RX ORDER — FAMOTIDINE 10 MG/ML
INJECTION INTRAVENOUS
Status: DISCONTINUED | OUTPATIENT
Start: 2017-08-25 | End: 2017-08-25

## 2017-08-25 RX ORDER — PROPOFOL 10 MG/ML
VIAL (ML) INTRAVENOUS
Status: DISCONTINUED | OUTPATIENT
Start: 2017-08-25 | End: 2017-08-25

## 2017-08-25 RX ORDER — HYDROMORPHONE HYDROCHLORIDE 1 MG/ML
0.2 INJECTION, SOLUTION INTRAMUSCULAR; INTRAVENOUS; SUBCUTANEOUS EVERY 5 MIN PRN
Status: DISCONTINUED | OUTPATIENT
Start: 2017-08-25 | End: 2017-08-25 | Stop reason: HOSPADM

## 2017-08-25 RX ORDER — ONDANSETRON 2 MG/ML
INJECTION INTRAMUSCULAR; INTRAVENOUS
Status: DISCONTINUED | OUTPATIENT
Start: 2017-08-25 | End: 2017-08-25

## 2017-08-25 RX ORDER — SODIUM CHLORIDE 0.9 % (FLUSH) 0.9 %
3 SYRINGE (ML) INJECTION
Status: DISCONTINUED | OUTPATIENT
Start: 2017-08-25 | End: 2017-08-25 | Stop reason: HOSPADM

## 2017-08-25 RX ORDER — ACETAMINOPHEN 10 MG/ML
INJECTION, SOLUTION INTRAVENOUS
Status: DISCONTINUED | OUTPATIENT
Start: 2017-08-25 | End: 2017-08-25

## 2017-08-25 RX ORDER — MUPIROCIN 20 MG/G
OINTMENT TOPICAL
Status: DISCONTINUED | OUTPATIENT
Start: 2017-08-25 | End: 2017-08-25 | Stop reason: HOSPADM

## 2017-08-25 RX ORDER — LIDOCAINE HYDROCHLORIDE 10 MG/ML
1 INJECTION, SOLUTION EPIDURAL; INFILTRATION; INTRACAUDAL; PERINEURAL ONCE
Status: COMPLETED | OUTPATIENT
Start: 2017-08-25 | End: 2017-08-25

## 2017-08-25 RX ORDER — SUCCINYLCHOLINE CHLORIDE 20 MG/ML
INJECTION INTRAMUSCULAR; INTRAVENOUS
Status: DISCONTINUED | OUTPATIENT
Start: 2017-08-25 | End: 2017-08-25

## 2017-08-25 RX ORDER — LIDOCAINE HYDROCHLORIDE AND EPINEPHRINE 20; 10 MG/ML; UG/ML
INJECTION, SOLUTION INFILTRATION; PERINEURAL
Status: DISCONTINUED | OUTPATIENT
Start: 2017-08-25 | End: 2017-08-25 | Stop reason: HOSPADM

## 2017-08-25 RX ORDER — ONDANSETRON 4 MG/1
4 TABLET, ORALLY DISINTEGRATING ORAL EVERY 6 HOURS PRN
Qty: 15 TABLET | Refills: 0 | Status: SHIPPED | OUTPATIENT
Start: 2017-08-25 | End: 2017-12-27

## 2017-08-25 RX ADMIN — MUPIROCIN 1 TUBE: 20 OINTMENT TOPICAL at 10:08

## 2017-08-25 RX ADMIN — EPINEPHRINE 2 MG: 1 INJECTION, SOLUTION INTRAMUSCULAR; SUBCUTANEOUS at 08:08

## 2017-08-25 RX ADMIN — SODIUM CHLORIDE, SODIUM GLUCONATE, SODIUM ACETATE, POTASSIUM CHLORIDE, MAGNESIUM CHLORIDE, SODIUM PHOSPHATE, DIBASIC, AND POTASSIUM PHOSPHATE: .53; .5; .37; .037; .03; .012; .00082 INJECTION, SOLUTION INTRAVENOUS at 07:08

## 2017-08-25 RX ADMIN — PROPOFOL 150 MCG/KG/MIN: 10 INJECTION, EMULSION INTRAVENOUS at 07:08

## 2017-08-25 RX ADMIN — THROMBIN, TOPICAL (BOVINE) 20000 UNITS: KIT at 08:08

## 2017-08-25 RX ADMIN — LIDOCAINE HYDROCHLORIDE 10 MG: 10 INJECTION, SOLUTION EPIDURAL; INFILTRATION; INTRACAUDAL; PERINEURAL at 06:08

## 2017-08-25 RX ADMIN — KETAMINE HYDROCHLORIDE 20 MG: 100 INJECTION, SOLUTION, CONCENTRATE INTRAMUSCULAR; INTRAVENOUS at 07:08

## 2017-08-25 RX ADMIN — PROPOFOL 100 MG: 10 INJECTION, EMULSION INTRAVENOUS at 07:08

## 2017-08-25 RX ADMIN — CEFAZOLIN SODIUM 2 G: 2 SOLUTION INTRAVENOUS at 07:08

## 2017-08-25 RX ADMIN — ONDANSETRON 4 MG: 2 INJECTION INTRAMUSCULAR; INTRAVENOUS at 10:08

## 2017-08-25 RX ADMIN — ACETAMINOPHEN 1000 MG: 10 INJECTION, SOLUTION INTRAVENOUS at 09:08

## 2017-08-25 RX ADMIN — PROPOFOL 200 MG: 10 INJECTION, EMULSION INTRAVENOUS at 07:08

## 2017-08-25 RX ADMIN — FAMOTIDINE 20 MG: 10 INJECTION, SOLUTION INTRAVENOUS at 08:08

## 2017-08-25 RX ADMIN — ROCURONIUM BROMIDE 10 MG: 10 INJECTION, SOLUTION INTRAVENOUS at 07:08

## 2017-08-25 RX ADMIN — FENTANYL CITRATE 100 MCG: 50 INJECTION, SOLUTION INTRAMUSCULAR; INTRAVENOUS at 07:08

## 2017-08-25 RX ADMIN — REMIFENTANIL HYDROCHLORIDE 0.05 MCG/KG/MIN: 1 INJECTION, POWDER, LYOPHILIZED, FOR SOLUTION INTRAVENOUS at 07:08

## 2017-08-25 RX ADMIN — OXYCODONE HYDROCHLORIDE AND ACETAMINOPHEN 1 TABLET: 5; 325 TABLET ORAL at 11:08

## 2017-08-25 RX ADMIN — SODIUM CHLORIDE: 0.9 INJECTION, SOLUTION INTRAVENOUS at 06:08

## 2017-08-25 RX ADMIN — MIDAZOLAM HYDROCHLORIDE 2 MG: 1 INJECTION, SOLUTION INTRAMUSCULAR; INTRAVENOUS at 09:08

## 2017-08-25 RX ADMIN — KETAMINE HYDROCHLORIDE 30 MG: 100 INJECTION, SOLUTION, CONCENTRATE INTRAMUSCULAR; INTRAVENOUS at 07:08

## 2017-08-25 RX ADMIN — DEXAMETHASONE SODIUM PHOSPHATE 12 MG: 4 INJECTION, SOLUTION INTRAMUSCULAR; INTRAVENOUS at 08:08

## 2017-08-25 RX ADMIN — SUCCINYLCHOLINE CHLORIDE 120 MG: 20 INJECTION, SOLUTION INTRAMUSCULAR; INTRAVENOUS at 07:08

## 2017-08-25 RX ADMIN — LIDOCAINE HYDROCHLORIDE AND EPINEPHRINE 20 ML: 20; 10 INJECTION, SOLUTION INFILTRATION; PERINEURAL at 08:08

## 2017-08-25 RX ADMIN — COCAINE HYDROCHLORIDE 4 ML: 40 SOLUTION TOPICAL at 08:08

## 2017-08-25 RX ADMIN — MIDAZOLAM HYDROCHLORIDE 2 MG: 1 INJECTION, SOLUTION INTRAMUSCULAR; INTRAVENOUS at 07:08

## 2017-08-25 NOTE — BRIEF OP NOTE
[facility]  Brief Operative Note     SUMMARY     Surgery Date: 8/25/2017     Surgeon(s) and Role:     * Jacob Patrick Brunner, MD - Resident - Assisting     * Sawyer Bernardo MD - Primary        Pre-op Diagnosis:  Chronic pansinusitis [J32.4]  CVID (common variable immunodeficiency) [D83.9]  Nasal polyp [J33.9]  Nasal turbinate hypertrophy [J34.3]  Conductive hearing loss of both ears [H90.0]  Otitis media with effusion, bilateral [H65.93]  Eustachian tube dysfunction, bilateral [H69.83]  Sjogren's syndrome, with unspecified organ involvement [M35.00]    Post-op Diagnosis:  Post-Op Diagnosis Codes:     * Chronic pansinusitis [J32.4]     * CVID (common variable immunodeficiency) [D83.9]     * Nasal polyp [J33.9]     * Nasal turbinate hypertrophy [J34.3]     * Conductive hearing loss of both ears [H90.0]     * Otitis media with effusion, bilateral [H65.93]     * Eustachian tube dysfunction, bilateral [H69.83]     * Sjogren's syndrome, with unspecified organ involvement [M35.00]    Procedure(s) (LRB):  RESECTION-TURBINATES (SMR) (Bilateral)  SINUS SURGERY FUNCTIONAL ENDOSCOPIC WITH NAVIGATION (Bilateral)    Anesthesia: General    Description of the findings of the procedure: see op note    Findings/Key Components: see op note    Estimated Blood Loss: 50 mL         Specimens:   Specimen (12h ago through future)    Start     Ordered    08/25/17 0940  Specimen to Pathology - Surgery  Once     Comments:  1.) Left ethmoid polyp- Permanent.2.) Right frontall sinus polyp- Permanent.3.) Left ethmoid polyp- Permanent.      08/25/17 1024          Discharge Note    SUMMARY     Admit Date: 8/25/2017    Discharge Date and Time:  08/25/2017 10:40 AM    Hospital Course (synopsis of major diagnoses, care, treatment, and services provided during the course of the hospital stay): POD0 s/p FESS, turbinate reduction, eustachian tube dilation.     Final Diagnosis: Post-Op Diagnosis Codes:     * Chronic pansinusitis  [J32.4]     * CVID (common variable immunodeficiency) [D83.9]     * Nasal polyp [J33.9]     * Nasal turbinate hypertrophy [J34.3]     * Conductive hearing loss of both ears [H90.0]     * Otitis media with effusion, bilateral [H65.93]     * Eustachian tube dysfunction, bilateral [H69.83]     * Sjogren's syndrome, with unspecified organ involvement [M35.00]    Disposition: Home or Self Care    Follow Up/Patient Instructions:     Medications:  Reconciled Home Medications:   Current Discharge Medication List      START taking these medications    Details   ondansetron (ZOFRAN-ODT) 4 MG TbDL Take 1 tablet (4 mg total) by mouth every 6 (six) hours as needed.  Qty: 15 tablet, Refills: 0      oxycodone-acetaminophen (PERCOCET) 5-325 mg per tablet Take 1 tablet by mouth every 6 (six) hours as needed for Pain.  Qty: 40 tablet, Refills: 0         CONTINUE these medications which have NOT CHANGED    Details   !! albuterol (PROAIR HFA) 90 mcg/actuation inhaler INHALE 2 PUFFS INTO THE LUNGS EVERY 4 HOURS AS NEEDED  Qty: 8.5 g, Refills: 11      b complex vitamins tablet Take 1 tablet by mouth every morning.       CALCIUM CARBONATE/VITAMIN D3 (VITAMIN D-3 ORAL) Take 4,000 Units by mouth every morning.       calcium-magnesium-zinc 333-133-5 mg Tab Take 1 tablet by mouth every morning.       CARBOXYMETHYLCELLULOSE SODIUM (REFRESH TEARS OPHT) Apply 2 drops to eye as needed.       cholecalciferol, vitamin D3, (VITAMIN D3) 5,000 unit Tab Take 5,000 Units by mouth every morning.      fluticasone (FLONASE) 50 mcg/actuation nasal spray 2 sprays left nostril BID; 2 sprays right nostril once a day  Qty: 1 Bottle, Refills: 3    Associated Diagnoses: Left nasal polyps      hydroxychloroquine (PLAQUENIL) 200 mg tablet Take 2 tablets (400 mg total) by mouth once daily.  Qty: 120 tablet, Refills: 3    Associated Diagnoses: Parotid gland enlargement      linaclotide (LINZESS) 145 mcg Cap capsule Take 1 capsule (145 mcg total) by mouth once  daily.  Qty: 30 capsule, Refills: 11    Associated Diagnoses: Constipation, unspecified constipation type      omeprazole (PRILOSEC) 40 MG capsule TAKE 1 CAPSULE (40 MG TOTAL) BY MOUTH EVERY MORNING.  Qty: 90 capsule, Refills: 3    Associated Diagnoses: Gastroesophageal reflux disease without esophagitis      pilocarpine (SALAGEN) 5 MG Tab Take 1 tablet (5 mg total) by mouth 4 (four) times daily.  Qty: 360 tablet, Refills: 3    Associated Diagnoses: Sjogren's syndrome; Parotid gland enlargement      UNABLE TO FIND Take 1 tablet by mouth every morning. Kavin red vitamins 500 mg      vitamin A 8000 UNIT capsule Take 8,000 Units by mouth every morning.       !! albuterol 90 mcg/actuation inhaler Inhale 2 puffs into the lungs every 4 (four) hours as needed for Wheezing or Shortness of Breath.  Qty: 1 Inhaler, Refills: 3      immun glob G,IgG,-pro-IgA 0-50 (HIZENTRA) 10 gram/50 mL (20 %) Soln Inject 10 g into the skin once a week.  Qty: 4 vial, Refills: 11      levocetirizine (XYZAL) 5 MG tablet TAKE 1 TABLET BY MOUTH EVERY EVENING  Qty: 30 tablet, Refills: 1      PREVIDENT 5000 PLUS 1.1 % Crea       saliva stimulant agents comb.3 (BIOTENE MOISTURIZING MOUTH) Spry by Mucous Membrane route as needed.       SALIVA SUBSTITUTION COMBO NO.9 (BIOTENE DRY MOUTH ORAL RINSE MM) by Mucous Membrane route as needed.       SODIUM CHLORIDE/ALOE VERA (AYR SALINE GEL NASL) by Nasal route as needed.        !! - Potential duplicate medications found. Please discuss with provider.          Discharge Procedure Orders  Diet general     Call MD for:  temperature >100.4     Call MD for:  persistent nausea and vomiting or diarrhea     Call MD for:  severe uncontrolled pain     Call MD for:  difficulty breathing or increased cough     Call MD for:  severe persistent headache     Call MD for:  worsening rash     Call MD for:  persistent dizziness, light-headedness, or visual disturbances     Call MD for:  increased confusion or weakness        Follow-up Information     Sawyer Bernardo MD In 1 week.    Specialty:  Otolaryngology  Contact information:  [address] [facility] [id number] [phone]

## 2017-08-25 NOTE — TRANSFER OF CARE
"Anesthesia Transfer of Care Note    Patient: Manasa Hollins    Procedure(s) Performed: Procedure(s) (LRB):  RESECTION-TURBINATES (SMR) (Bilateral)  SINUS SURGERY FUNCTIONAL ENDOSCOPIC WITH NAVIGATION (Bilateral)    Patient location: PACU    Anesthesia Type: general    Transport from OR: Transported from OR on 6-10 L/min O2 by face mask with adequate spontaneous ventilation    Post pain: adequate analgesia    Post assessment: no apparent anesthetic complications    Post vital signs: stable    Level of consciousness: awake    Nausea/Vomiting: no nausea/vomiting    Complications: none    Transfer of care protocol was followed      Last vitals:   Visit Vitals  /63 (BP Location: Left arm, Patient Position: Lying)   Pulse 100   Temp 36.5 °C (97.7 °F) (Axillary)   Resp 20   Ht 5' 3" (1.6 m)   Wt 93.4 kg (206 lb)   LMP 08/25/2017   SpO2 100%   Breastfeeding? No   BMI 36.49 kg/m²     "

## 2017-08-25 NOTE — ANESTHESIA POSTPROCEDURE EVALUATION
"Anesthesia Post Evaluation    Patient: Manasa Hollins    Procedure(s) Performed: Procedure(s) (LRB):  RESECTION-TURBINATES (SMR) (Bilateral)  SINUS SURGERY FUNCTIONAL ENDOSCOPIC WITH NAVIGATION (Bilateral)    Final Anesthesia Type: general  Patient location during evaluation: PACU  Patient participation: Yes- Able to Participate  Level of consciousness: awake and alert  Post-procedure vital signs: reviewed and stable  Pain management: adequate  Airway patency: patent  PONV status at discharge: No PONV  Anesthetic complications: no      Cardiovascular status: hemodynamically stable and blood pressure returned to baseline  Respiratory status: unassisted and spontaneous ventilation  Hydration status: euvolemic  Follow-up not needed.        Visit Vitals  BP (!) 141/82   Pulse 83   Temp 36.5 °C (97.7 °F) (Axillary)   Resp 20   Ht 5' 3" (1.6 m)   Wt 93.4 kg (206 lb)   LMP 08/25/2017   SpO2 97%   Breastfeeding? No   BMI 36.49 kg/m²       Pain/Yoanna Score: Pain Assessment Performed: Yes (8/25/2017 12:00 PM)  Presence of Pain: complains of pain/discomfort (8/25/2017 12:00 PM)  Pain Rating Prior to Med Admin: 7 (8/25/2017 11:23 AM)  Yoanna Score: 9 (8/25/2017 12:00 PM)      "

## 2017-08-25 NOTE — OP NOTE
DATE OF OPERATION: 8/25/2017    SURGEON:  Sawyer Bernardo MD     ASSISTANT SURGEON:  Jacob Brunner, MD     OPERATION:     1. Bilateral image-guided endoscopic total ethmoidectomy.  2. Bilateral image-guided endoscopic maxillary antrostomy.  3. Bilateral image-guided endoscopic sphenoidotomy.  4. Bilateral image-guided endoscopic frontal dissection with Draf IIA sinusotomy.  5. Bilateral inferior turbinate reduction with submucosal resection.  6. Endoscopic adenoidectomy.     PREOPERATIVE DIAGNOSIS:      1. Chronic rhinosinusitis.  2. Hypertrophic turbinates.  3. Adenoid hypertrophy.   4. Eustachian tube dysfunction.     POSTOPERATIVE DIAGNOSIS:      1. Chronic rhinosinusitis.  2. Hypertrophic turbinates.  3. Adenoid hypertrophy.   4. Eustachian tube dysfunction.     ANESTHESIA: General.     COMPLICATIONS: None.     ESTIMATED BLOOD LOSS: 50 mL     SPECIMEN: Bilateral ethmoid.  Right frontal polyp.     WOUND EXPECTANCY: Clean-contaminated.    DRESSING: Propel in bilateral middle meatus.  No nasal packing.     FINDINGS: Diffuse polypoid edema in bilateral ethmoid and frontal sinuses.  Compound inferior turbinate hypertrophy with prior anterior head resection.  Prior blowout injury of the left infero-medial orbital wall.  Diffuse adenoid hypertrophy with extension onto posterior torus of both Eustachian tubes.     INDICATIONS: Chronic rhinosinusitis, not controlled with maximal medical therapy.     I discussed the risks, benefits and alternatives of surgical correction of the chronically obstructed sinuses and associated turbinate hypertrophy with the patient as well as the expected postoperative course. I gave her the opportunity to ask questions and I answered all of them. On the morning of surgery I again met with the patient and reviewed the indications for surgery and she consented to proceed.     DESCRIPTION OF PROCEDURE: The patient was brought to the operating room and placed supine on the operating table. The  patient was placed under general anesthesia and intubated. The patient was positioned with a donut under the head and the image-guidance headset for the Shoutlet Fusion system was applied.  Image-guided navigation was indicated to facilitate exenteration of all ethmoid cells and the extent of sinusotomy.  The CT scan disc was loaded into the image-guidance system and registered with the patient tracking system according to the 's instructions. The pointer was calibrated and registration was verified using predefined landmarks.  Cottonoid pledgets soaked with 4% cocaine was placed into the nasal cavity bilaterally for mucosal decongestion.  Prophylactic cefazolin was given prior to the surgery start. A time-out was performed to confirm the proper patient, site and procedure.  The CT images were again reviewed prior to surgical start.  The patient was prepped and draped in the usual fashion.  The bed was placed in 20-degree reverse Trendelenberg position.     A 0-degree endoscope was used to examine the nasal cavity.  Local injections of 1% lidocaine with 1:100,000 parts epinephrine were then performed around the middle turbinates bilaterally as well as the inferior turbinate and the sphenopalatine ganglion region bilaterally.    Inferior turbinate reduction was then performed.  Incisions were made in the anterior head of the inferior turbinate using a #6400 blade.  A Collingsworth elevator was then tunnelled medially to the turbinate bone and used to segmentally outfracture and morselize the bone.  Then, a 2 mm blade on the powered tissue shaver was tunneled submucosally and used to resect soft tissue of the turbinate while overlying mucosa was preserved.  Because the head of the turbinate had been previously resected, a second incision was required to access the middle third of the turbinate.   Finally, the turbinates were outfractured using a King/Boies elevator.  An identical procedure was performed  bilaterally.  On the right side, the posterior turbinate was very bulbous and polypoid and therefore resected with a tissue shaver and cauterized with the bovie at a setting of 30 haq.     Attention was then turned to the sinuses. The left middle meatus was topically decongested using pledgets containing 10,000 units of thrombin with 1:10,000 parts epinephrine. The majority of the middle turbinate had been previously resected.  However, a large posterior pendulous portion remained, which was removed now with cutting instruments.  A firm rounded structure was present at the superior rim of the previous maxillary antrostomy, which corresponded to the prior orbital injury seen on the preoperative scan.  This area was avoided, but the natural ostium anterior to that appeared occluded.  The residual uncinate process was then visualized and a micro-red backbiter was used to incise the uncinate process over the site of the natural maxillary ostium. The uncinate was dissected to its superior attachment and removed using cutting forceps.  A tere bullosa was not present.     After removal of the bone, the natural ostium of the maxillary sinus was visible. The lumen was visualized with a 30-degree scope and entered using a curved suction to confirm the dimension. The antrostomy was enlarged with cutting instruments to include the natural sinus ostium, taking care not to move anterior to the maxillary line so as to avoid injury to the nasolacrimal duct.  Additional bone was removed using forceps.  No abnormal tissue  was present within the maxillary sinus.        The ethmoid bulla was entered using a microdebrider and anterior ethmoidectomy was completed, as there were multiple ridges with overlying polypoid mucosa present at the skull base.  The grand lamella of the middle turbinate was then traversed and posterior ethmoidectomy was performed.  An Onodi cell was present.  The mucosa was hypertrophic throughout the  sinuses, indicative of chronic inflammation.  Again, the area of prior blowout fracture was avoided.  Topical hemostatic agents on pledgets were then placed into the ethmoid cavity for hemostasis.    The sphenoid sinus rostrum was identified and the sinus was entered in a low and medial fashion, adjacent to the superior turbinate. This sphenoidotomy was enlarged to include the posterior segment of this superior turbinate and the natural ostium of the sphenoid sinus.  No abnormal tissue was present within the sphenoid sinus.       Dissection was performed at the site of the nasofrontal recess.  Using a 70-degree scope, a suprabullar cell was dissected and uncapped in a medial-to-lateral direction.  An agger nasi cell was then opened from an retrograde approach.  A supra-agger frontal cell was present which was also opened in a retrograde fashion using angulated cutting instruments.  Afterward, the frontal sinus ostium was visible but stenotic.  Therefore, the ostium was enlarged anteriorly using cutting instruments, taking care to avoid circumerential mucosal injury.  Powered instrumentation was not required.  The floor of the frontal sinus was removed between the lamina of the orbit and the suspension of the middle turbinate to complete a Draf IIA sinusotomy.  The anterior ethmoidal artery was identified and avoided with assistance from the image-guidance system probe.  At the conclusion of dissection there was excellent visualization into the frontal sinus, which would not otherwise have been possible.     Attention was then turned to the right side of the sinonasal tract.  A similar procedure was performed on this side, including uncinectomy, maxillary antrostomy, total ethmoidectomy, sphenoidotomy and frontal sinus dissection.  On this side, no blowout fracture was present, and the middle turbinate had remained intact from the prior surgery.  As the middle turbinate was pendulous into the nasal cavity, the  inferior 5 mm were resected with cutting instruments to improve the airway.     At the conclusion of these procedures, the image-guidance probe was used to verify that all ethmoid cells had been properly opened and that the skull base was visible and that the lamina papyracea had not been traversed on either side.  All sinuses were copiously irrigated with warm normal saline solution.     Attention was then turned to the adenoids.  The 2+ hypertrophy was primarily central but extended notably to the posterior torus of both Eustachian tubes.  Therefore, the suction bovie at a setting of 30 haq was used to ablate the central pad as well as to spot cauterize the hypertrophic posterior torus.  Care was taken to avoid the Eustachian tube lumen.  There was excellent hemostasis.     At this point, the pledgets were all removed. Malia hemostatic agent was placed into the surgical sites.  A Propel steroid-eluting stent was placed into the bilateral ethmoid cavities to stent open the surgical site.  The nasal cavity was not packed.  Mupirocin ointment was applied to the vestibule bilaterally.  At this point, the headset was removed and the drapes were taken down. Intravenous dexamethasone was given toward the end of the case. The patient was turned back toward the anesthesiologist and awakened from anesthesia, extubated and transferred to the recovery room in stable condition.      POSTOPERATIVE PLAN:  Budesonide once stents are out.

## 2017-08-25 NOTE — H&P
History and Physical       Interval HPI    No changes since last seen, states she is ready for procedure today        Manasa Hollins is a 41 y.o. female who was referred to me by Dr. Ivan Rosales* in consultation for sinusitis.     She relates a long history of sinus problems which culminated in surgery by Dr. North several years ago, which she recalls being quite painful.  She has more recently been seen in the allergy clinic where she has had positive tests for dust mites and also given a pneumovax for low titers.  She has also been on Hizentra for several months.  She relates a pattern of frequent sinus infections, including thick nasal discharge, facial pressure, rhinorrhea, postnasal drip and hyposmia.  She additionally has profound difficulty hearing during these exacerbations, which occur seemingly every few weeks and last for several days or longer.  She has been treated with multiple courses of antibiotics.  The ears have muffled hearing, and she has noted pus coming from the right ear at times.  She previously had some sort of a tube placed in the right ear by Dr. Rae, which has since extruded.  She uses Flonase and Xyzal, and also is currently on Augmentin.  She takes albuterol for control of asthma.  She denies prior nasal trauma other than the surgery.     SNOT-22 score = 86, NOSE score = 85%     Global QOL = 35%     Days missed = Less than 5     PTC = deferred        Past Medical History  She has a past medical history of Asthma and Sjogren's disease.     Past Surgical History  She has a past surgical history that includes  section; Myomectomy; Tubal ligation; and upper gi (2016).     Family History  Her family history includes Asthma in her child; Cancer in her maternal grandmother, mother, and other.     Social History  She reports that she has never smoked. She has never used smokeless tobacco. She reports that she does not drink alcohol or use  drugs.     Allergies  She is allergic to sulfa dyne.     Medications   She has a current medication list which includes the following prescription(s): albuterol, albuterol, amoxicillin-clavulanate 875-125mg, b complex vitamins, calcium carbonate/vitamin d3, calcium-magnesium-zinc, carboxymethylcellulose sodium, fluticasone, glycerin/propylene glycol, hydroxychloroquine, immun glob g(igg)-pro-iga 0-50, levocetirizine, linaclotide, omeprazole, pilocarpine, prednisone, prevident 5000 plus, saliva stimulant agents comb.3, saliva substitute combo no.9, sodium chloride/aloe vera, and vitamin a.     Review of Systems  Review of Systems   Constitutional: Positive for chills and fatigue. Negative for fever and unexpected weight change.   HENT: Positive for ear discharge, ear pain, facial swelling, hearing loss, postnasal drip, sinus pressure, sore throat, trouble swallowing and voice change. Negative for congestion, dental problem, hoarse voice, nosebleeds, rhinorrhea and tinnitus.    Eyes: Positive for photophobia and visual disturbance. Negative for discharge and itching.   Respiratory: Positive for cough and shortness of breath. Negative for apnea and wheezing.    Cardiovascular: Negative for chest pain and palpitations.   Gastrointestinal: Positive for nausea. Negative for abdominal pain and vomiting.   Endocrine: Positive for cold intolerance. Negative for heat intolerance.   Genitourinary: Negative for difficulty urinating.   Musculoskeletal: Positive for arthralgias and myalgias. Negative for back pain and neck pain.   Skin: Negative for rash.   Allergic/Immunologic: Positive for environmental allergies. Negative for food allergies.   Neurological: Positive for dizziness and headaches. Negative for seizures, syncope and weakness.   Hematological: Positive for adenopathy. Does not bruise/bleed easily.   Psychiatric/Behavioral: Positive for sleep disturbance. Negative for decreased concentration and dysphoric mood. The  patient is not nervous/anxious.             Objective:      /75   Pulse 79   Wt 92.9 kg (204 lb 12.9 oz)   BMI 35.16 kg/m²         Constitutional:   She appears well-developed. She is cooperative. Normal speech.  No hoarse voice.       Head:  Normocephalic. Salivary glands normal.  Facial strength is normal.       Ears:    Right Ear: No drainage or tenderness. Tympanic membrane is not perforated. Tympanic membrane mobility is normal. No middle ear effusion. Decreased hearing is noted.   Left Ear: No drainage or tenderness. Tympanic membrane is not perforated. Tympanic membrane mobility is abnormal. A middle ear effusion is present. Decreased hearing is noted.   Apparent hypercompliant TM on right.  Effusion without TM mobility on left.     Nose:  Mucosal edema and rhinorrhea present. No septal deviation or polyps. No epistaxis. Turbinate hypertrophy.  Turbinates normal and no turbinate masses.  Right sinus exhibits no maxillary sinus tenderness and no frontal sinus tenderness. Left sinus exhibits no maxillary sinus tenderness and no frontal sinus tenderness.      Mouth/Throat  Oropharynx clear and moist without lesions or asymmetry and normal uvula midline. She does not have dentures. Normal dentition. No oral lesions or mucous membrane lesions. No oropharyngeal exudate or posterior oropharyngeal erythema. Mirror exam not performed due to patient tolerance.  Mirror exam not performed due to patient tolerance.       Neck:  Neck normal without thyromegaly masses, asymmetry, normal tracheal structure, crepitus, and tenderness, thyroid normal, trachea normal and no adenopathy. Normal range of motion present.     She has no cervical adenopathy.      Cardiovascular:   Regular rhythm.       Pulmonary/Chest:   Effort normal.      Psychiatric:   She has a normal mood and affect. Her speech is normal and behavior is normal.      Neurological:   No cranial nerve deficit.      Skin:   No rash noted.          Procedure     Nasal endoscopy performed.  See procedure note.      Left nasal valve      Left nasal polyp      Left ethmiod      Right nasal valve      Right ethmoid      Right ET with edema and mucus           Data Reviewed         WBC (K/uL)   Date Value   04/27/2017 5.46          Eosinophil% (%)   Date Value   04/27/2017 1.1          Eos # (K/uL)   Date Value   04/27/2017 0.1          Platelets (K/uL)   Date Value   04/27/2017 195          Glucose (mg/dL)   Date Value   04/27/2017 87          IgE (IU/mL)   Date Value   04/29/2016 84      Immunocap Class III for dust mites and cockroaches, otherwise negative.             IgG - Serum   Date Value Ref Range Status   04/29/2016 4489 (H) 650 - 1600 mg/dL Final       Comment:       IgG Cord Blood Reference Range: 650-1600 mg/dL.          IgM (mg/dL)   Date Value   04/29/2016 50          IgA (mg/dL)   Date Value   04/29/2016 495 (H)            IgG 1   Date Value Ref Range Status   05/03/2016 3,490 (H) 490 - 1140 mg/dL Final            IgG 2   Date Value Ref Range Status   05/03/2016 56 (L) 150 - 640 mg/dL Final            IgG 3   Date Value Ref Range Status   05/03/2016 47 11 - 85 mg/dL Final              IgG 4   Date Value Ref Range Status   05/03/2016 38 3 - 201 mg/dL Final       Comment:       Test performed at [facility],  [address] , [place]  [address]     [phone]  Joaquin Cheatham MD  - Medical Director            I independently reviewed the images of the CT sinuses dated 12/8/16. Pertinent findings include partial to complete opacification of all sinuses, worse on right, with apparent healed fracture of infero-medial left orbital wall.     I independently reviewed the tracings of the complete audiometric evaluation performed today.  I reviewed the audiogram with the patient as well.  Pertinent findings include bilateral conductive hearing loss, worse on the right, with flat tymp on right and flat far-left shift on  left.             Assessment:      1. Chronic pansinusitis    2. Otitis media with effusion, bilateral    3. Nasal turbinate hypertrophy    4. Eustachian tube dysfunction, bilateral    5. Conductive hearing loss of both ears    6. CVID (common variable immunodeficiency)    7. Nasal polyp    8. Sjogren's syndrome, with unspecified organ involvement          Plan:      To OR for FESS, SMR turbs, possible eustachian tube dilation

## 2017-08-25 NOTE — DISCHARGE INSTRUCTIONS
INSTRUCTIONS TO FOLLOW AFTER SINUS SURGERY  DR. McCOUL - [facility]      Your first office visit with Dr. Bernardo after surgery should have been already scheduled.  If you dont know when it is, call Dr. Vital nurse Cortez at [phone].    ACTIVITY:    Sleep on your back with the head of the bead elevated, up on 2-3 pillows, or in a recliner for the first 3 to 5 days. This will help with swelling.     After surgery you may have a lot of nasal drainage. This is normal. Do not wipe, touch, or dab your nose. You may breathe through your nose if youre able but avoid inhaling forcibly. Let all drainage fall on your mustache dressing and change it as needed.    You may shower 24 hours after surgery.    If you use CPAP or BiPAP to sleep at night, you should wait at least 48 hours before resuming use.  Dr. Bernardo will advise you when it is safe to do this.    DRESSINGS:    Change the mustache dressing under your nose as needed. (If unsure what this dressing is or how to do this, ask your doctor or nurse before you leave the clinic/hospital.) You may have pinkish-red drainage for 2-3 days.      MEDICATIONS:  After surgery, you are generally sent home with prescription for a pain medicationand an anti-nausea medication.         Most people need prescription pain medication for the first few days after surgery.  If you find that this is not necessary, you may use over-the-counter Tylenol (Acetaminophen) instead.    Avoid Aspirin, Motrin (Ibuprofen), Advil, Aleve and Vitamin E for 1 week before surgery and 1 week after surgery. There are many other medications to avoid as well due to the fact they act as blood thinners.      Some people have problems with bowel movements after surgery. If you have NOT had a bowel movement 3-5 days after surgery, go to your local pharmacy and purchase an over the counter stool softener such as COLACE. You can also ask the pharmacist for his or her recommendation. If you still do not have  a bowel movement after starting the softener, please call Dr. Vital office.    You will need these over-the-counter medications after surgery:    Saline Sinus Rinse (Aren Med brand):  You will use this to rinse out your nose and sinuses after surgery.  Begin doing this the day after surgery, unless instructed otherwise by Dr. Bernardo.  You should do this 2 times a day, following the instructions on the box.    Afrin (regular strength): Only use if you have nasal congestion or bleeding. Use 2 times per day for 3 days, stop for 1 day, continue 2 times per day for 3 days, then stop completely.  NOTE:  You may not need to do this at all.      RESTRICTED ACTIVITIES AFTER SURGERY:    Do NOT blow your nose for 2 weeks. If you have to sneeze or cough, do so with your mouth open.   AVOID all heavy lifting, straining or bending for 2 weeks.   AVOID any sexual activity for 2 weeks after surgery.  AVOID semi-contact sports or vigorous exercising for 3-4 weeks. Dr. Bernardo will let you know when you are cleared to resume exercise.  No Swimming for 3-4 weeks.  No Flying for 2 weeks.    Do NOT operate a motor vehicle or any type of heavy machinery within 24 hours of taking pain medication.  DO NOT smoke or be around smokers.  AVOID irritating substances such as dust, chalk, harsh chemicals, and allergic triggers.    DIET:    Avoid hot and spicy foods, or salty soups for 1 week after surgery.  Begin with bland foods the day after surgery and advance to your regular diet as tolerated.  It is not necessary to take only soft food unless you are recovering from tonsil surgery.  Drink plenty of fluids (water is best).   Avoid alcoholic and caffeinated beverages for 1 week after surgery.

## 2017-08-31 ENCOUNTER — OFFICE VISIT (OUTPATIENT)
Dept: OTOLARYNGOLOGY | Facility: CLINIC | Age: 42
End: 2017-08-31
Payer: COMMERCIAL

## 2017-08-31 VITALS
SYSTOLIC BLOOD PRESSURE: 119 MMHG | HEART RATE: 97 BPM | BODY MASS INDEX: 36.2 KG/M2 | DIASTOLIC BLOOD PRESSURE: 76 MMHG | WEIGHT: 204.38 LBS

## 2017-08-31 DIAGNOSIS — H69.93 EUSTACHIAN TUBE DYSFUNCTION, BILATERAL: ICD-10-CM

## 2017-08-31 DIAGNOSIS — J45.30 MILD PERSISTENT ASTHMA WITHOUT COMPLICATION: Primary | ICD-10-CM

## 2017-08-31 DIAGNOSIS — J32.4 CHRONIC PANSINUSITIS: ICD-10-CM

## 2017-08-31 DIAGNOSIS — D83.9 CVID (COMMON VARIABLE IMMUNODEFICIENCY): ICD-10-CM

## 2017-08-31 PROCEDURE — 99212 OFFICE O/P EST SF 10 MIN: CPT | Mod: 25,24,S$GLB, | Performed by: OTOLARYNGOLOGY

## 2017-08-31 PROCEDURE — 31237 NSL/SINS NDSC SURG BX POLYPC: CPT | Mod: 50,79,S$GLB, | Performed by: OTOLARYNGOLOGY

## 2017-08-31 PROCEDURE — 3008F BODY MASS INDEX DOCD: CPT | Mod: S$GLB,,, | Performed by: OTOLARYNGOLOGY

## 2017-08-31 PROCEDURE — 99999 PR PBB SHADOW E&M-EST. PATIENT-LVL III: CPT | Mod: PBBFAC,,, | Performed by: OTOLARYNGOLOGY

## 2017-08-31 RX ORDER — BUDESONIDE 0.5 MG/2ML
0.5 INHALANT ORAL 2 TIMES DAILY
Qty: 180 ML | Refills: 0 | Status: SHIPPED | OUTPATIENT
Start: 2017-08-31 | End: 2017-12-03 | Stop reason: SDUPTHER

## 2017-08-31 NOTE — PROCEDURES
Nasal/sinus endoscopy  Date/Time: 8/31/2017 2:06 PM  Performed by: NITIN MANCILLA  Authorized by: NITIN MANCILLA     Consent Done?:  Yes (Verbal)  Anesthesia:     Local anesthetic:  Lidocaine 4% and Elijah-Synephrine 1/2%    Patient tolerance:  Patient tolerated the procedure well with no immediate complications  Nose:     Procedure Performed:  Removal of Debridement  External:      No external nasal deformity  Intranasal:      Mucosa no polyps     Mucosa ulcers not present     No mucosa lesions present     Turbinates not enlarged     No septum gross deformity  Nasopharynx:      No mucosa lesions     Adenoids not present     Posterior choanae patent     Eustachian tube patent     Debridement of both ethmoid cavities performed with Santillan forceps.  Sinuses otherwise patent.  Propel stents dissolving and removed.

## 2017-08-31 NOTE — PROGRESS NOTES
Subjective:      Manasa Hollins is a 41 y.o. female who comes for follow-up 1 week status-post endoscopic sinus surgery.  Some congestion and frontal headache, mostly on right.  Ears less clogged.  Some wheezing.  Using saline rinse daily.    QOL assessment deferred.      Objective:     /76 (BP Location: Right arm)   Pulse 97   Wt 92.7 kg (204 lb 5.9 oz)   LMP 08/25/2017   BMI 36.20 kg/m²      General:   not in distress   Nasal:  edematous mucosa   no septal hematoma   no bleeding   Oral Cavity:   clear   Oropharynx:   no bleeding   Neck:   nontender       Procedure    Endoscopic debridement performed.  See procedure note.        Data Reviewed    Pathology report indicated non-eosinophilic chronic inflammation.         Assessment:     Doing well following bilateral endoscopic sinus surgery.  She also underwent septum/turbinate surgery which is unrelated to the reason for today's visit.    1. Mild persistent asthma without complication    2. Chronic pansinusitis    3. Eustachian tube dysfunction, bilateral    4. CVID (common variable immunodeficiency)         Plan:     Rx budesonide sinus rinse BID.  Return in about 3 weeks (around 9/21/2017).

## 2017-09-05 ENCOUNTER — TELEPHONE (OUTPATIENT)
Dept: OTOLARYNGOLOGY | Facility: CLINIC | Age: 42
End: 2017-09-05

## 2017-09-16 ENCOUNTER — PATIENT MESSAGE (OUTPATIENT)
Dept: FAMILY MEDICINE | Facility: CLINIC | Age: 42
End: 2017-09-16

## 2017-09-16 ENCOUNTER — PATIENT MESSAGE (OUTPATIENT)
Dept: OTOLARYNGOLOGY | Facility: CLINIC | Age: 42
End: 2017-09-16

## 2017-09-21 ENCOUNTER — CLINICAL SUPPORT (OUTPATIENT)
Dept: AUDIOLOGY | Facility: CLINIC | Age: 42
End: 2017-09-21
Payer: COMMERCIAL

## 2017-09-21 ENCOUNTER — OFFICE VISIT (OUTPATIENT)
Dept: OTOLARYNGOLOGY | Facility: CLINIC | Age: 42
End: 2017-09-21
Payer: COMMERCIAL

## 2017-09-21 VITALS
HEART RATE: 76 BPM | BODY MASS INDEX: 36.16 KG/M2 | DIASTOLIC BLOOD PRESSURE: 78 MMHG | WEIGHT: 204.13 LBS | SYSTOLIC BLOOD PRESSURE: 119 MMHG

## 2017-09-21 DIAGNOSIS — J32.4 CHRONIC PANSINUSITIS: ICD-10-CM

## 2017-09-21 DIAGNOSIS — J45.30 MILD PERSISTENT ASTHMA WITHOUT COMPLICATION: ICD-10-CM

## 2017-09-21 DIAGNOSIS — H69.93 EUSTACHIAN TUBE DYSFUNCTION, BILATERAL: Primary | ICD-10-CM

## 2017-09-21 DIAGNOSIS — H90.11 CONDUCTIVE HEARING LOSS OF RIGHT EAR WITH UNRESTRICTED HEARING OF LEFT EAR: Primary | ICD-10-CM

## 2017-09-21 PROCEDURE — 3008F BODY MASS INDEX DOCD: CPT | Mod: S$GLB,,, | Performed by: OTOLARYNGOLOGY

## 2017-09-21 PROCEDURE — 99999 PR PBB SHADOW E&M-EST. PATIENT-LVL III: CPT | Mod: PBBFAC,,, | Performed by: OTOLARYNGOLOGY

## 2017-09-21 PROCEDURE — 92567 TYMPANOMETRY: CPT | Mod: S$GLB,,, | Performed by: AUDIOLOGIST

## 2017-09-21 PROCEDURE — 31231 NASAL ENDOSCOPY DX: CPT | Mod: 79,S$GLB,, | Performed by: OTOLARYNGOLOGY

## 2017-09-21 PROCEDURE — 99213 OFFICE O/P EST LOW 20 MIN: CPT | Mod: 25,24,S$GLB, | Performed by: OTOLARYNGOLOGY

## 2017-09-21 PROCEDURE — 99999 PR PBB SHADOW E&M-EST. PATIENT-LVL I: CPT | Mod: PBBFAC,,,

## 2017-09-21 RX ORDER — AMOXICILLIN 500 MG/1
500 TABLET, FILM COATED ORAL EVERY 12 HOURS
Qty: 20 TABLET | Refills: 0 | Status: SHIPPED | OUTPATIENT
Start: 2017-09-21 | End: 2017-10-01

## 2017-09-21 NOTE — PROGRESS NOTES
Ms. Hollins was seen in the clinic today for a hearing evaluation.      Audiological testing revealed a mild conductive hearing loss in the right ear and normal hearing sensitivity in the left ear. A speech reception threshold was obtained at 30 dBHL in the right ear and 25 dBHL in the left ear. Speech discrimination was 100%, bilaterally.    Tympanometry revealed a Type B tympanogram in the right ear and a Type C tympanogram in the left ear.    Recommendations:  1. Otologic evaluation  2. Follow-up hearing evaluation, as needed  3. Noise protection

## 2017-09-21 NOTE — PROGRESS NOTES
Subjective:      Manasa Hollins is a 41 y.o. female who comes for follow-up 4 weeks status-post endoscopic sinus surgery.  Doing better, breathing great.  No more aural fullness, just some itching, thinks she has a cold from the kids at school since last week.  Some occasional green discharge and midcheek pressure.  Using budesonide rinse BID.    SNOT-22 score = 47, NOSE score = 40%, ETDQ-7 score = 1.1      Objective:     /78   Pulse 76   Wt 92.6 kg (204 lb 2.3 oz)   LMP 08/25/2017   BMI 36.16 kg/m²      General:   not in distress   Nasal:  edematous mucosa   no septal hematoma   no bleeding   Oral Cavity:   clear   Oropharynx:   no bleeding   Neck:   nontender       Procedure    Nasal endoscopy performed.  See procedure note.     Left nasal valve     Left sinuses     Right nasal valve     Right sinuses        Data Reviewed    Pathology report indicated non-eosinophilic chronic inflammation.        Tympanogram done today reverted to type C bilaterally.      Assessment:     Doing well following bilateral endoscopic sinus surgery.  She also underwent septum/turbinate surgery which is unrelated to the reason for today's visit.    1. Eustachian tube dysfunction, bilateral    2. Chronic pansinusitis    3. Mild persistent asthma without complication         Plan:     Rx amoxicillin 10 days.  Continue budesonide rinse BID.  Return in about 2 months (around 11/21/2017).

## 2017-09-21 NOTE — PROCEDURES
Nasal/sinus endoscopy  Date/Time: 9/21/2017 9:37 AM  Performed by: NITIN MANCILLA  Authorized by: NITIN MANCILLA     Consent Done?:  Yes (Verbal)  Anesthesia:     Local anesthetic:  Lidocaine 4% and Elijah-Synephrine 1/2%    Patient tolerance:  Patient tolerated the procedure well with no immediate complications  Nose:     Procedure Performed:  Nasal Endoscopy  External:      No external nasal deformity  Intranasal:      Mucosa no polyps     Mucosa ulcers not present     No mucosa lesions present     Turbinates not enlarged     No septum gross deformity  Nasopharynx:      No mucosa lesions     Adenoids not present     Posterior choanae patent     Eustachian tube patent     Sinuses patent with moderate edema diffusely.  Scattered murky exudate bilaterally.

## 2017-10-21 DIAGNOSIS — K21.9 GASTROESOPHAGEAL REFLUX DISEASE WITHOUT ESOPHAGITIS: ICD-10-CM

## 2017-10-23 ENCOUNTER — OFFICE VISIT (OUTPATIENT)
Dept: FAMILY MEDICINE | Facility: CLINIC | Age: 42
End: 2017-10-23
Payer: COMMERCIAL

## 2017-10-23 VITALS
WEIGHT: 206.69 LBS | DIASTOLIC BLOOD PRESSURE: 60 MMHG | TEMPERATURE: 99 F | SYSTOLIC BLOOD PRESSURE: 105 MMHG | HEIGHT: 63 IN | BODY MASS INDEX: 36.62 KG/M2 | OXYGEN SATURATION: 99 % | HEART RATE: 80 BPM

## 2017-10-23 DIAGNOSIS — S46.811A STRAIN OF RIGHT TRAPEZIUS MUSCLE, INITIAL ENCOUNTER: Primary | ICD-10-CM

## 2017-10-23 PROCEDURE — 99999 PR PBB SHADOW E&M-EST. PATIENT-LVL V: CPT | Mod: PBBFAC,,, | Performed by: NURSE PRACTITIONER

## 2017-10-23 PROCEDURE — 99214 OFFICE O/P EST MOD 30 MIN: CPT | Mod: S$GLB,,, | Performed by: NURSE PRACTITIONER

## 2017-10-23 RX ORDER — AMOXICILLIN 500 MG/1
500 CAPSULE ORAL EVERY 12 HOURS
Refills: 0 | COMMUNITY
Start: 2017-09-21 | End: 2017-12-18 | Stop reason: ALTCHOICE

## 2017-10-23 RX ORDER — OMEPRAZOLE 40 MG/1
CAPSULE, DELAYED RELEASE ORAL
Qty: 90 CAPSULE | Refills: 3 | OUTPATIENT
Start: 2017-10-23

## 2017-10-23 RX ORDER — OXYCODONE AND ACETAMINOPHEN 5; 325 MG/1; MG/1
1 TABLET ORAL EVERY 6 HOURS PRN
Refills: 0 | COMMUNITY
Start: 2017-08-25 | End: 2017-12-27

## 2017-10-23 RX ORDER — CYCLOBENZAPRINE HCL 10 MG
10 TABLET ORAL 3 TIMES DAILY PRN
Qty: 30 TABLET | Refills: 0 | Status: SHIPPED | OUTPATIENT
Start: 2017-10-23 | End: 2017-11-02

## 2017-10-23 NOTE — PATIENT INSTRUCTIONS
Muscle Strain in the Extremities  A muscle strain is a stretching and tearing of muscle fibers. This causes pain, especially when you move that muscle. There may also be some swelling and bruising.  Home care  · Keep the hurt area raised to reduce pain and swelling. This is especially important during the first 48 hours.  · Apply an ice pack over the injured area for 15 to 20 minutes every 3 to 6 hours. You should do this for the first 24 to 48 hours. You can make an ice pack by filling a plastic bag that seals at the top with ice cubes and then wrapping it with a thin towel. Be careful not to injure your skin with the ice treatments. Ice should never be applied directly to skin. Continue the use of ice packs for relief of pain and swelling as needed. After 48 hours, apply heat (warm shower or warm bath) for 15 to 20 minutes several times a day, or alternate ice and heat.  · You may use over-the-counter pain medicine to control pain, unless another medicine was prescribed. If you have chronic liver or kidney disease or ever had a stomach ulcer or GI bleeding, talk with your healthcare provider before using these medicines.  · For leg strains: If crutches have been recommended, dont put full weight on the hurt leg until you can do so without pain. You can return to sports when you are able to hop and run on the injured leg without pain.  Follow-up care  Follow up with your healthcare provider, or as advised.  When to seek medical advice  Call your healthcare provider right away if any of these occur:  · The toes of the injured leg become swollen, cold, blue, numb, or tingly  · Pain or swelling increases  Date Last Reviewed: 11/19/2015  © 6947-0829 [employer]. [address] [place] [address], [place] [address]. All rights reserved. This information is not intended as a substitute for professional medical care. Always follow your healthcare professional's instructions.

## 2017-10-23 NOTE — PROGRESS NOTES
History of Present Illness   Manasa Hollins is a 41 y.o. woman with medical history as listed below who presents today for evaluation of right sided upper back and neck pain. Pain has been present for four days. Pain is located along right trapezius and radiates in to neck. She describes pain as a tightness with spasm. She has associated headaches. She denies known injury or trauma. She denies swelling, redness, warmth, or rashes. She has no fever or chills. She has tried heat to the area and Tylenol with minimal relief. She has no additional complaints and is otherwise healthy on today's visit.      Past Medical History:   Diagnosis Date    Asthma     General anesthetics causing adverse effect in therapeutic use     GERD (gastroesophageal reflux disease)     Iron deficiency anemia     Sjogren's disease        Past Surgical History:   Procedure Laterality Date     SECTION      x4    MYOMECTOMY      TUBAL LIGATION      done during myometomy surgery.    upper gi  2016       Social History     Social History    Marital status:      Spouse name: N/A    Number of children: N/A    Years of education: N/A     Social History Main Topics    Smoking status: Never Smoker    Smokeless tobacco: Never Used    Alcohol use No    Drug use: No    Sexual activity: No     Other Topics Concern    None     Social History Narrative    None       Family History   Problem Relation Age of Onset    Cancer Mother      Lung/Cervical    Cancer Maternal Grandmother      Breast/Cervical    Cancer Other      Breast     Asthma Child     Emphysema Neg Hx     Colon cancer Neg Hx     Stomach cancer Neg Hx     Esophageal cancer Neg Hx     Ulcerative colitis Neg Hx     Crohn's disease Neg Hx     Irritable bowel syndrome Neg Hx     Celiac disease Neg Hx        Review of Systems  Review of Systems   Constitutional: Negative for chills and fever.   Musculoskeletal: Positive for myalgias and neck  "pain. Negative for falls and joint pain.   Skin: Negative for rash.     A complete review of systems was otherwise negative.    Physical Exam  /60 (BP Location: Right arm, Patient Position: Sitting)   Pulse 80   Temp 98.5 °F (36.9 °C) (Oral)   Ht 5' 3" (1.6 m)   Wt 93.8 kg (206 lb 10.9 oz)   LMP 10/17/2017   SpO2 99%   BMI 36.61 kg/m²   General appearance: alert, appears stated age, cooperative and no distress  Extremities: extremities normal, atraumatic, no cyanosis or edema  Pulses: 2+ and symmetric  Skin: Skin color, texture, turgor normal. No rashes or lesions  Neurologic: Grossly normal  Musculoskeletal: There is tenderness to palpation along right trapezius and right sternocleidomastoid with tightness and spasm. There is normal ROM to neck, right shoulder, and right elbow. There is no associated numbness or tingling.    Assessment/Plan  Strain of right trapezius muscle, initial encounter  Flexeril as needed.  Continue Tylenol and heat to the area.  Handout provided on stretches which may help with pain.  Avoid heavy lifting or strenuous activity.  -     cyclobenzaprine (FLEXERIL) 10 MG tablet; Take 1 tablet (10 mg total) by mouth 3 (three) times daily as needed for Muscle spasms.  Dispense: 30 tablet; Refill: 0    She has verbalized understanding and is in agreement with plan of care.    Return if symptoms worsen or fail to improve.  "

## 2017-11-22 ENCOUNTER — OFFICE VISIT (OUTPATIENT)
Dept: FAMILY MEDICINE | Facility: CLINIC | Age: 42
End: 2017-11-22
Payer: COMMERCIAL

## 2017-11-22 ENCOUNTER — HOSPITAL ENCOUNTER (OUTPATIENT)
Dept: RADIOLOGY | Facility: HOSPITAL | Age: 42
Discharge: HOME | End: 2017-11-22
Attending: NURSE PRACTITIONER
Payer: COMMERCIAL

## 2017-11-22 VITALS
WEIGHT: 206.13 LBS | TEMPERATURE: 98 F | BODY MASS INDEX: 36.52 KG/M2 | HEIGHT: 63 IN | OXYGEN SATURATION: 98 % | HEART RATE: 84 BPM | SYSTOLIC BLOOD PRESSURE: 94 MMHG | DIASTOLIC BLOOD PRESSURE: 60 MMHG

## 2017-11-22 DIAGNOSIS — Z12.31 VISIT FOR SCREENING MAMMOGRAM: ICD-10-CM

## 2017-11-22 DIAGNOSIS — Z00.00 WELL WOMAN EXAM (NO GYNECOLOGICAL EXAM): Primary | ICD-10-CM

## 2017-11-22 DIAGNOSIS — Z23 NEED FOR INFLUENZA VACCINATION: ICD-10-CM

## 2017-11-22 PROCEDURE — 77067 SCR MAMMO BI INCL CAD: CPT | Mod: TC,PO

## 2017-11-22 PROCEDURE — 90471 IMMUNIZATION ADMIN: CPT | Mod: S$GLB,,, | Performed by: FAMILY MEDICINE

## 2017-11-22 PROCEDURE — 99999 PR PBB SHADOW E&M-EST. PATIENT-LVL V: CPT | Mod: PBBFAC,,, | Performed by: NURSE PRACTITIONER

## 2017-11-22 PROCEDURE — 77063 BREAST TOMOSYNTHESIS BI: CPT | Mod: 26,,, | Performed by: RADIOLOGY

## 2017-11-22 PROCEDURE — 99396 PREV VISIT EST AGE 40-64: CPT | Mod: 25,S$GLB,, | Performed by: NURSE PRACTITIONER

## 2017-11-22 PROCEDURE — 77067 SCR MAMMO BI INCL CAD: CPT | Mod: 26,,, | Performed by: RADIOLOGY

## 2017-11-22 PROCEDURE — 90686 IIV4 VACC NO PRSV 0.5 ML IM: CPT | Mod: S$GLB,,, | Performed by: FAMILY MEDICINE

## 2017-11-22 NOTE — PROGRESS NOTES
"Well Woman VISIT      CHIEF COMPLAINT  Chief Complaint   Patient presents with    Annual Exam     flu shot        HPI  Manasa Hollins is a 41 y.o. female who presents annual exam.     Social Factors  Tobacco use: No  Ready to Quit: Na  Alcohol: No  Intimate partner violence screening  "Do you feel safe in your current relationship?" Yes   "Have you ever been in a relationship in which your partner frightened you or hurt you?" No  Living Will/POA: No  Regular Exercise: No    Depression  Over the past two weeks, have you felt down, depressed, or hopeless? No  Over the past two weeks, have you felt little interest or pleasure in doing things? No    Reproductive Health  Last menstrual period began 10/07/2017 and was regular.   Patient is sexually active.   Contraception: no method  On Daily folic acid:  No  STD screening in last year: No  Last PAP: 10/11/2017  HIV screening: No    CHD, HTN, DM2  CHD Risk Factors: none  Women 45 years and older should be screened for dyslipidemia if at increased risk of CHD  Women 20 to 45 years of age should be screened for dyslipidemia if at increased risk of CHD  Asymptomatic adults with sustained blood pressure greater than 135/80 mm Hg (treated or untreated) should be screened for type 2 diabetes mellitus    Estimated body mass index is 36.51 kg/m² as calculated from the following:    Height as of this encounter: 5' 3" (1.6 m).    Weight as of this encounter: 93.5 kg (206 lb 2.1 oz).      Screening  Mammogram needed: Ordered  Colonoscopy needed: not clinically indicated  Osteoporosis screen needed: Not clinically indicated     Women 50 to 74 years of age should be screened for breast cancer with mammography biennially.  Women should be screened for cervical cancer with Pap tests beginning at 21 years of age. Low-risk women should receive Pap testing every three years. Co-testing for human papillomavirus is an option beginning at 30 years of age, and can extend the " "screening interval to five years. Cervical cancer screening should be discontinued at 65 years of age or after total hysterectomy if the woman has a benign gynecologic history  Adults 50 to 75 years of age should be screened for colorectal cancer with an FOBT annually, sigmoidoscopy every five years with an FOBT every three years, or colonoscopy every 10 years.  Women 65 years and older should be screened for osteoporosis. Women younger than 65 years should be screened if the risk of fracture is greater than or equal to that of a 65-year-old white woman without additional risk factors.      Immunizations  up to date and documented    ALLERGIES and MEDS were verified.   PMHx, PSHx, FHx, SOCIALHx were updated as pertinent.    REVIEW OF SYSTEMS  Review or Systems  Eyes: denies visual changes at this time denies floaters   ENT: no nasal congestion or sore throat  Respiratory: no cough or shorness of breath  Cardiovascular: no chest pain or palpitations  Gastrointestinal: no nausea or vomiting, no abdominal pain or change in bowel habits  Genitourinary: no hematuria or dysuria; denies frequency  Hematologic/Lymphatic: no easy bruising or lymphadenopathy  Musculoskeletal: no arthralgias or myalgias  Neurological: no seizures or tremors  Endocrine: no heat or cold intolerance      PHYSICAL EXAM  VITAL SIGNS: BP 94/60 (BP Location: Right arm, Patient Position: Sitting, BP Method: Large (Manual))   Pulse 84   Temp 98.2 °F (36.8 °C) (Oral)   Ht 5' 3" (1.6 m)   Wt 93.5 kg (206 lb 2.1 oz)   LMP 10/07/2017   SpO2 98%   BMI 36.51 kg/m²   GEN: Well developed, Well nourished, No acute distress.  HENT: Normocephalic, Atraumatic, Bilateral external ears normal, Nose normal, Oropharynx moist, No oral exudates.   Eyes: PERRLA, EOMI, Conjunctiva normal, No discharge.   Neck: Supple, No tenderness.  Lymphatic: No cervical or supraclavicular lymphadenopathy noted.   Cardiovascular: Normal heart rate, Normal rhythm, No murmurs, No " rubs, No gallops.   Thorax & Lungs: Normal breath sounds, No respiratory distress, No wheezing.  Abdomen: Soft, No tenderness, Bowel sounds normal.  Breast: Deferred  Genital:Deferred  Skin: Warm, Dry, No erythema, No rash.   Extremities: No edema, No tenderness.       ASSESSMENT/PLAN    Manasa was seen today for annual exam.    Diagnoses and all orders for this visit:    Well woman exam (no gynecological exam)  -     CBC auto differential; Future  -     TSH; Future  -     Lipid panel; Future    Need for influenza vaccination  -     Influenza - Quadrivalent (3 years & older) (PF)       Health Maintenance: up to date  Preventative Care: annual eye exam and dental exam      FOLLOW UP: Return in about 1 year (around 11/22/2018).

## 2017-12-03 DIAGNOSIS — J32.4 CHRONIC PANSINUSITIS: ICD-10-CM

## 2017-12-04 RX ORDER — BUDESONIDE 0.5 MG/2ML
INHALANT ORAL
Qty: 360 ML | Refills: 0 | Status: SHIPPED | OUTPATIENT
Start: 2017-12-04 | End: 2017-12-18 | Stop reason: ALTCHOICE

## 2017-12-18 ENCOUNTER — OFFICE VISIT (OUTPATIENT)
Dept: ALLERGY | Facility: CLINIC | Age: 42
End: 2017-12-18
Payer: COMMERCIAL

## 2017-12-18 VITALS
WEIGHT: 204.81 LBS | HEIGHT: 64 IN | DIASTOLIC BLOOD PRESSURE: 60 MMHG | SYSTOLIC BLOOD PRESSURE: 110 MMHG | BODY MASS INDEX: 34.97 KG/M2

## 2017-12-18 DIAGNOSIS — D80.6 ANTI-POLYSACCHARIDE ANTIBODY DEFICIENCY: Primary | ICD-10-CM

## 2017-12-18 DIAGNOSIS — K21.9 GASTROESOPHAGEAL REFLUX DISEASE WITHOUT ESOPHAGITIS: ICD-10-CM

## 2017-12-18 DIAGNOSIS — M35.00 SJOGREN'S SYNDROME, WITH UNSPECIFIED ORGAN INVOLVEMENT: ICD-10-CM

## 2017-12-18 PROCEDURE — 99214 OFFICE O/P EST MOD 30 MIN: CPT | Mod: S$GLB,,, | Performed by: ALLERGY & IMMUNOLOGY

## 2017-12-18 PROCEDURE — 99999 PR PBB SHADOW E&M-EST. PATIENT-LVL IV: CPT | Mod: PBBFAC,,, | Performed by: ALLERGY & IMMUNOLOGY

## 2017-12-18 NOTE — PROGRESS NOTES
Subjective:       Patient ID: Manasa Hollins is a 42 y.o. female.     6/1/17    Chief Complaint:  Follow-up (6 months)      HPI:     Pt w Sjogren's synd, anemia, GERD, AR, and specific antibody def w IgG2 def and hx recurrent sinusitis and otitis media. Hizentra started Dec '16  S/p sinus surgery 2014 (Dr. North), and again most recently in Aug '17 w Dr. Bernardo.   Has been doing well since sinus surgery. Continues nasal steroids and sinus rinses. No suspected sinusitis since surgery.  Denies any interval wheeze.  Continues Hizentra 10 g weekly (430 mg/kg/mo). occ has some increased fatigue 5-6 d post infusion. [facility] infuses on Fridays. Denies sig SE from Hizentra.  GERD has been poorly controlled. Has noted increased heartburn. Has upcoming GI appt. Is usu on omeprazole 40 mg daily. Has been trying to eat dinner earlier and cut out chocolate from her diet.        Past Medical History:   Diagnosis Date    Asthma     General anesthetics causing adverse effect in therapeutic use     GERD (gastroesophageal reflux disease)     Iron deficiency anemia     Sjogren's disease    GERD  anemia      Family History   Problem Relation Age of Onset    Cancer Mother      Lung/Cervical    Cancer Maternal Grandmother      Breast/Cervical    Cancer Other      Breast     Asthma Child     Emphysema Neg Hx     Colon cancer Neg Hx     Stomach cancer Neg Hx     Esophageal cancer Neg Hx     Ulcerative colitis Neg Hx     Crohn's disease Neg Hx     Irritable bowel syndrome Neg Hx     Celiac disease Neg Hx           Environmental History: Pets in the home: dogs (1).  [place]: hardwood floors  Tobacco Smoke in Home: yes    smokes outside    Review of Systems   Constitutional: Negative for appetite change, chills, fever and unexpected weight change.   HENT: Positive for postnasal drip. Negative for congestion, ear pain, facial swelling and sinus pressure.    Eyes: Negative for photophobia, discharge and visual  disturbance.   Respiratory: Negative for cough, chest tightness, shortness of breath and wheezing.    Cardiovascular: Negative for chest pain and palpitations.   Gastrointestinal: Negative for abdominal distention.   Musculoskeletal: Negative for arthralgias and joint swelling.   Neurological: Negative for dizziness and headaches.   Hematological: Negative for adenopathy. Does not bruise/bleed easily.   Psychiatric/Behavioral: Negative for behavioral problems and sleep disturbance.        Objective:    Physical Exam   Constitutional: She is oriented to person, place, and time. She appears well-developed and well-nourished. No distress.   HENT:   Head: Normocephalic.   Right Ear: Hearing, tympanic membrane and ear canal normal.   Left Ear: Hearing, tympanic membrane and ear canal normal.   Nose: No mucosal edema, rhinorrhea, sinus tenderness or septal deviation. Right sinus exhibits no maxillary sinus tenderness and no frontal sinus tenderness. Left sinus exhibits no maxillary sinus tenderness and no frontal sinus tenderness.   Mouth/Throat: Uvula is midline, oropharynx is clear and moist and mucous membranes are normal. No uvula swelling.   Eyes: Conjunctivae are normal. Right eye exhibits no discharge. Left eye exhibits no discharge.   Neck: Normal range of motion. No thyromegaly present.   Cardiovascular: Normal rate and regular rhythm.    No murmur heard.  Pulmonary/Chest: Effort normal and breath sounds normal. No respiratory distress. She has no wheezes.   Abdominal: Soft. She exhibits no distension. There is no tenderness. There is no guarding.   Musculoskeletal: Normal range of motion. She exhibits no edema or tenderness.   Lymphadenopathy:     She has no cervical adenopathy.   Neurological: She is alert and oriented to person, place, and time.   Skin: Skin is warm. No rash noted. No erythema.   Psychiatric: She has a normal mood and affect. Her behavior is normal.   Nursing note and vitals  reviewed.      Laboratory:      immunoCAPs pos to dust mites and cockroach  Results for BRIDGER SHERIDAN (MRN [id number]) as of 5/19/2016 13:24   Ref. Range 4/29/2016 11:30   IgG - Serum Latest Ref Range: 650 - 1600 mg/dL 4489 (H)   IgM Latest Ref Range: 50 - 300 mg/dL 50   IgA Latest Ref Range: 40 - 350 mg/dL 495 (H)   IgE Latest Ref Range: 0 - 100 IU/mL 84     Results for BRIDGER SHERIDAN (MRN [id number]) as of 9/7/2016 09:19   Ref. Range 5/3/2016 10:53   IgG 1 Latest Ref Range: 490 - 1140 mg/dL 3,490 (H)   IgG 2 Latest Ref Range: 150 - 640 mg/dL 56 (L)   IgG 3 Latest Ref Range: 11 - 85 mg/dL 47   IgG 4 Latest Ref Range: 3 - 201 mg/dL 38     Results for BRIDGER SHERIDAN (MRN [id number]) as of 9/7/2016 09:19   Ref. Range 4/29/2016 11:30 7/2/2016 11:12 7/5/2016 09:01 8/24/2016 08:17   S.pneumoniae Type 1 Latest Units: mcg/mL <0.3  0.4 0.7   S.pneumoniae Type 3 Latest Units: mcg/mL <0.3  <0.3 3.5   Strep pneumo Type 4 Latest Units: mcg/mL <0.3  <0.3 <0.3   S.pneumoniae Type 5 Latest Units: mcg/mL <0.3  <0.3 <0.3   S.pneumoniae Type 6B Latest Units: mcg/mL <0.3  <0.3 <0.3   S.pneumoniae Type 7F Latest Units: mcg/mL 2.3  3.0 6.1   S.pneumoniae Type 8 Latest Units: mcg/mL <0.3  <0.3 <0.3   S.pneumoniae Type 9N Latest Units: mcg/mL <0.3  <0.3 0.6   S.pneumoniae Type 9V Abs Latest Units: mcg/mL <0.3  <0.3 0.3   S.pneumoniae Type 12F Latest Units: mcg/mL <0.3  <0.3 <0.3   Strep pneumo Type 14 Latest Units: mcg/mL <0.3  0.5 0.7   S.pneumoniae Type 18C Latest Units: mcg/mL <0.3  0.3 0.3   S.pneumoniae Type 19F Latest Units: mcg/mL <0.3  0.4 0.5   S.pneumoniae Type 23F Latest Units: mcg/mL <0.3  <0.3 <0.3       CT Sinuses 12/17/16    Impression:  Postsurgical changes of prior functional endoscopic sinus surgery. Left medial antrostomy is patent, but the right is now obstructed.  Significant increase in mucosal thickening or fluid opacification of paranasal sinuses as above. Hyperattenuating material within the  right frontal sinus can be seen with inspissated secretions or fungal elements.     Question left-sided nasal polyposis.      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 Sjogren's   3. GERD   4. Hx Mild persistent asthma without complication         Plan:       1. Continue weekly SQ IgG replacement, Hizentra, 10 g SQ weekly (~430 mg/kg/mo).   2. flonase  3. Sinus rinses  4.  xyzal prn  5. Cont prilosec for GERD. Keep GI fu   RTC 6 mo, sooner prn

## 2017-12-26 ENCOUNTER — PATIENT MESSAGE (OUTPATIENT)
Dept: FAMILY MEDICINE | Facility: CLINIC | Age: 42
End: 2017-12-26

## 2017-12-27 ENCOUNTER — OFFICE VISIT (OUTPATIENT)
Dept: GASTROENTEROLOGY | Facility: CLINIC | Age: 42
End: 2017-12-27
Payer: COMMERCIAL

## 2017-12-27 VITALS
SYSTOLIC BLOOD PRESSURE: 109 MMHG | HEIGHT: 64 IN | BODY MASS INDEX: 34.62 KG/M2 | HEART RATE: 86 BPM | DIASTOLIC BLOOD PRESSURE: 70 MMHG | WEIGHT: 202.81 LBS

## 2017-12-27 DIAGNOSIS — K21.9 GASTROESOPHAGEAL REFLUX DISEASE WITHOUT ESOPHAGITIS: Primary | ICD-10-CM

## 2017-12-27 DIAGNOSIS — K59.09 CHRONIC CONSTIPATION: ICD-10-CM

## 2017-12-27 PROCEDURE — 99214 OFFICE O/P EST MOD 30 MIN: CPT | Mod: S$GLB,,, | Performed by: NURSE PRACTITIONER

## 2017-12-27 PROCEDURE — 99999 PR PBB SHADOW E&M-EST. PATIENT-LVL IV: CPT | Mod: PBBFAC,,, | Performed by: NURSE PRACTITIONER

## 2017-12-27 RX ORDER — OMEPRAZOLE 40 MG/1
40 CAPSULE, DELAYED RELEASE ORAL EVERY MORNING
Qty: 30 CAPSULE | Refills: 11 | Status: SHIPPED | OUTPATIENT
Start: 2017-12-27 | End: 2017-12-27 | Stop reason: SDUPTHER

## 2017-12-27 RX ORDER — PHENOL 1.4 %
AEROSOL, SPRAY (ML) MUCOUS MEMBRANE
COMMUNITY
End: 2019-08-19

## 2017-12-27 RX ORDER — OMEPRAZOLE 40 MG/1
40 CAPSULE, DELAYED RELEASE ORAL EVERY MORNING
Qty: 90 CAPSULE | Refills: 3 | Status: SHIPPED | OUTPATIENT
Start: 2017-12-27 | End: 2019-03-13 | Stop reason: SDUPTHER

## 2017-12-27 NOTE — PROGRESS NOTES
" [facility] Note    Reason for Visit:  Gastroesophageal reflux    PCP: Tomi Myers     Referring MD:     HPI:This is a 42 y.o. female here for f/u evaluation of gastroesophageal reflux and constipation. My last visit with Ms. Hollins ("Kaylyn") was on 2016.  She has a hx of sjogren's and currently reports she stopped linzess d/t cost and has been avoiding chocolate and cheese and taking Miralax 17 g once HS with 1 formed BM/ every other day. She notes the linzess was working well for her when she was taking it.     GERD-formerly well controlled on omeprazole 40 mg once daily. Recurrence of retrosternal pyrosis up to throat  when she ran out of PPI in 2017. Has been taking ginger ale and zantac 150 mg 1-2 daily with some relief.  sleeps with HOB elevated. Eats dinner at 6:30.    Abdominal pain-denies.  Dysphagia-controlled with keeping membranes moist and swallow precautions.  GI bleeding - denies hematochezia, hematemesis, melena, BRBPR, black/tarry stools, and coffee ground emesis     ROS:  Constitutional: No fevers, no chills, No unintentional weight loss, no increase in fatigue,   ENT: + allergies  CV: No chest pain, no palpitations, no perif. edema, no sob on exertion  Pulm: no cough, No shortness of breath, +occasional wheezes s/p asthma,  Ophtho: No vision changes  GI: see HPI; also no nausea, no vomiting, no change in appetite  Derm: No rash  Heme: No lymphadenopathy, No bruising  MSK: + arthritis hands and knees, no muscle pain, no muscle weakness  : No dysuria, No hematuria  Endo: No hot or cold intolerance  Neuro: No syncope, No seizure,     Medical History:  has a past medical history of Asthma; General anesthetics causing adverse effect in therapeutic use; GERD (gastroesophageal reflux disease); Iron deficiency anemia; and Sjogren's disease.    Surgical History:  has a past surgical history that includes  section; Myomectomy; Tubal ligation; and upper gi " (06/24/2016).    Family History: family history includes Asthma in her child; Cancer in her maternal grandmother, mother, and other..     Social History:  reports that she has never smoked. She has never used smokeless tobacco. She reports that she does not drink alcohol or use drugs.    Allergies   Allergen Reactions    Sulfa Dyne Shortness Of Breath     Other reaction(s): CAUSES ASTHMA ATTACK, Is not sure of the name of the medication that she is allergic to       Current Outpatient Prescriptions   Medication Sig    albuterol (PROAIR HFA) 90 mcg/actuation inhaler INHALE 2 PUFFS INTO THE LUNGS EVERY 4 HOURS AS NEEDED    albuterol 90 mcg/actuation inhaler Inhale 2 puffs into the lungs every 4 (four) hours as needed for Wheezing or Shortness of Breath.    b complex vitamins tablet Take 1 tablet by mouth every morning.     CALCIUM CARBONATE/VITAMIN D3 (VITAMIN D-3 ORAL) Take 4,000 Units by mouth every morning.     calcium-magnesium-zinc 333-133-5 mg Tab Take 1 tablet by mouth every morning.     CARBOXYMETHYLCELLULOSE SODIUM (REFRESH TEARS OPHT) Apply 2 drops to eye as needed.     cholecalciferol, vitamin D3, (VITAMIN D3) 5,000 unit Tab Take 5,000 Units by mouth every morning.    fluticasone (FLONASE) 50 mcg/actuation nasal spray 2 sprays left nostril BID; 2 sprays right nostril once a day (Patient taking differently: 2 sprays by Each Nare route nightly. 2 sprays left nostril BID; 2 sprays right nostril once a day)    hydroxychloroquine (PLAQUENIL) 200 mg tablet Take 2 tablets (400 mg total) by mouth once daily. (Patient taking differently: Take 400 mg by mouth every morning. )    immun glob G,IgG,-pro-IgA 0-50 (HIZENTRA) 10 gram/50 mL (20 %) Soln Inject 10 g into the skin once a week. (Patient taking differently: Inject 10 g into the skin once a week. Take on Friday evenings)    krill-om-3-dha-epa-phospho-ast (MEGARED OMEGA-3 KRILL OIL) 707-84-41-50 mg Cap Take by mouth.    levocetirizine (XYZAL) 5 MG  "tablet TAKE 1 TABLET BY MOUTH EVERY EVENING (Patient taking differently: TAKE 1 TABLET BY MOUTH EVERY EVENING, at bedtime)    pilocarpine (SALAGEN) 5 MG Tab Take 1 tablet (5 mg total) by mouth 4 (four) times daily.    PREVIDENT 5000 PLUS 1.1 % Crea     saliva stimulant agents comb.3 (BIOTENE MOISTURIZING MOUTH) Spry by Mucous Membrane route as needed.     SALIVA SUBSTITUTION COMBO NO.9 (BIOTENE DRY MOUTH ORAL RINSE MM) by Mucous Membrane route as needed.     UNABLE TO FIND Take 1 tablet by mouth every morning. Kavin red vitamins 500 mg    vitamin A 8000 UNIT capsule Take 8,000 Units by mouth every morning.     omeprazole (PRILOSEC) 40 MG capsule Take 1 capsule (40 mg total) by mouth every morning.     No current facility-administered medications for this visit.        Objective Findings:    Vital Signs:  /70   Pulse 86   Ht 5' 4" (1.626 m)   Wt 92 kg (202 lb 13.2 oz)   BMI 34.81 kg/m²   Body mass index is 34.81 kg/m².    Physical Exam:  General Appearance: Well appearing in no acute distress  Head:   Normocephalic, without obvious abnormality  Eyes:    No scleral icterus, EOMI  ENT: Neck supple, Lips, mucosa, and tongue normal; teeth and gums normal  Lungs: CTA bilaterally in anterior and posterior fields, no wheezes, no crackles.  Heart:  Regular rate and rhythm, S1, S2 normal, no murmurs heard  Abdomen: Soft, +epigastric tenderness, non distended with positive bowel sounds in all four quadrants. No hepatosplenomegaly, ascites, or mass  Extremities: 2+ radial pulses, no clubbing, cyanosis or edema  Extremities:no clubbing, cyanosis or edema  Skin: No rash to exposed areas  Neurologic: A&Ox4    Labs:  Lab Results   Component Value Date    WBC 6.20 11/22/2017    HGB 8.9 (L) 11/22/2017    HCT 28.1 (L) 11/22/2017     11/22/2017    CHOL 116 (L) 11/22/2017    TRIG 68 11/22/2017    HDL 46 11/22/2017    ALT 13 12/18/2017    AST 16 12/18/2017     12/18/2017    K 3.8 12/18/2017     " 12/18/2017    CREATININE 0.7 12/18/2017    BUN 9 12/18/2017    CO2 24 12/18/2017    TSH 1.081 11/22/2017    INR 1.0 05/29/2014         Endoscopy:    6/24/2016 EGD Dr. Merida-small hiatal hernia  Colonoscopy-none  Assessment:  1. Gastroesophageal reflux disease without esophagitis    2. Chronic constipation           Recommendations:  1.GERD-previously well controlled until running out. Restart  Omeprazole 40 mg 30-45 min b/f breakfast daily. Continue GERD healthy lifestyle. Zantac PRN.  Pt reminded of slightly increased risk of CAP, C diff infections and decreased mag, vit D and Vit B- 12 levels with long term PPI use. Yearly Mag, B 12 and Vit D levels per pcp recomended. Routine bone mineral densities per PCP recommended. Pt instructed to contact PCP for s/s of lung infection (fever, chills, CP, SOB, and/or productive cough) or GI infection (profuse, watery diarrhea with or without rectal bleeding, severe abd pain,and/or fever).  2. Constipation-well controlled. Continue dietary modifications and daily Miralax.     Return in about 1 year (around 12/27/2018) for long term PPI use, chronic constipation.      Order summary:  Orders Placed This Encounter    omeprazole (PRILOSEC) 40 MG capsule         Thank you so much for allowing me to participate in the care of Manasa Marroquin, MICHEL,FNP-C

## 2018-01-11 ENCOUNTER — OFFICE VISIT (OUTPATIENT)
Dept: OTOLARYNGOLOGY | Facility: CLINIC | Age: 43
End: 2018-01-11
Payer: COMMERCIAL

## 2018-01-11 VITALS — HEART RATE: 78 BPM | SYSTOLIC BLOOD PRESSURE: 111 MMHG | DIASTOLIC BLOOD PRESSURE: 76 MMHG

## 2018-01-11 DIAGNOSIS — J45.30 MILD PERSISTENT ASTHMA WITHOUT COMPLICATION: ICD-10-CM

## 2018-01-11 DIAGNOSIS — J32.4 CHRONIC PANSINUSITIS: Primary | ICD-10-CM

## 2018-01-11 DIAGNOSIS — H69.93 EUSTACHIAN TUBE DYSFUNCTION, BILATERAL: ICD-10-CM

## 2018-01-11 DIAGNOSIS — M35.00 SJOGREN'S SYNDROME, WITH UNSPECIFIED ORGAN INVOLVEMENT: ICD-10-CM

## 2018-01-11 DIAGNOSIS — D83.9 CVID (COMMON VARIABLE IMMUNODEFICIENCY): ICD-10-CM

## 2018-01-11 PROCEDURE — 31231 NASAL ENDOSCOPY DX: CPT | Mod: S$GLB,,, | Performed by: OTOLARYNGOLOGY

## 2018-01-11 PROCEDURE — 99213 OFFICE O/P EST LOW 20 MIN: CPT | Mod: 25,S$GLB,, | Performed by: OTOLARYNGOLOGY

## 2018-01-11 PROCEDURE — 99999 PR PBB SHADOW E&M-EST. PATIENT-LVL III: CPT | Mod: PBBFAC,,, | Performed by: OTOLARYNGOLOGY

## 2018-01-11 RX ORDER — FLUTICASONE PROPIONATE 50 MCG
2 SPRAY, SUSPENSION (ML) NASAL DAILY
Qty: 16 G | Refills: 2 | Status: SHIPPED | OUTPATIENT
Start: 2018-01-11 | End: 2018-04-11

## 2018-01-11 NOTE — PROGRESS NOTES
Subjective:      Manasa is a 42 y.o. female who comes for follow-up of sinusitis.  Her last visit with me was on 9/21/2017.  Now 3-1/2 months status-post endoscopic sinus surgery.   Doing much better, breathing very well, no drainage or facial pressure, no ear fullness.  Not sleeping well, attributes to Sjogrens.  Using budesonide rinse most days.    SNOT-22 score = 38, NOSE score = 25%    The patient's medications, allergies, past medical, surgical, social and family histories were reviewed and updated as appropriate.    A detailed review of systems was obtained with pertinent positives as per the above HPI, and otherwise negative.        Objective:     /76   Pulse 78        Constitutional:   She appears well-developed. She is cooperative.     Head:  Normocephalic.     Nose:  No mucosal edema, rhinorrhea, septal deviation or polyps. No epistaxis. Turbinates normal, no turbinate masses and no turbinate hypertrophy.  Right sinus exhibits no maxillary sinus tenderness and no frontal sinus tenderness. Left sinus exhibits no maxillary sinus tenderness and no frontal sinus tenderness.     Mouth/Throat  Oropharynx clear and moist without lesions or asymmetry. No oropharyngeal exudate or posterior oropharyngeal erythema.     Neck:  No adenopathy. Normal range of motion present.     She has no cervical adenopathy.       Procedure    Nasal endoscopy performed.  See procedure note.     Left nasal valve     Left sphenoid     Left frontal     Right nasal valve     Right ethmoid and sphenoid     Right frontal        Data Reviewed    WBC (K/uL)   Date Value   11/22/2017 6.20     Eosinophil% (%)   Date Value   11/22/2017 1.9     Eos # (K/uL)   Date Value   11/22/2017 0.1     Platelets (K/uL)   Date Value   11/22/2017 231     Glucose (mg/dL)   Date Value   12/18/2017 94     IgE (IU/mL)   Date Value   04/29/2016 84       Pathology report indicated non-eosinophilic chronic inflammation.           Assessment:     1. Chronic  pansinusitis    2. Eustachian tube dysfunction, bilateral    3. CVID (common variable immunodeficiency)    4. Mild persistent asthma without complication    5. Sjogren's syndrome, with unspecified organ involvement         Plan:     Great response.  Stop budesonide rinse.  Rx flonase daily.  Return if symptoms worsen or fail to improve.

## 2018-01-11 NOTE — PROCEDURES
Nasal/sinus endoscopy  Date/Time: 1/11/2018 3:06 PM  Performed by: NITIN MANCILLA  Authorized by: NITIN MANCILLA     Consent Done?:  Yes (Verbal)  Anesthesia:     Local anesthetic:  Lidocaine 4% and Elijah-Synephrine 1/2%    Patient tolerance:  Patient tolerated the procedure well with no immediate complications  Nose:     Procedure Performed:  Nasal Endoscopy  External:      No external nasal deformity  Intranasal:      Mucosa no polyps     Mucosa ulcers not present     No mucosa lesions present     Turbinates not enlarged     No septum gross deformity  Nasopharynx:      No mucosa lesions     Adenoids not present     Posterior choanae patent     Eustachian tube patent     Sinuses all clear.  No polyps or purulence.

## 2018-01-31 ENCOUNTER — PATIENT MESSAGE (OUTPATIENT)
Dept: GASTROENTEROLOGY | Facility: CLINIC | Age: 43
End: 2018-01-31

## 2018-02-02 DIAGNOSIS — K59.09 CHRONIC CONSTIPATION: Primary | ICD-10-CM

## 2018-02-05 ENCOUNTER — TELEPHONE (OUTPATIENT)
Dept: ALLERGY | Facility: CLINIC | Age: 43
End: 2018-02-05

## 2018-02-05 NOTE — TELEPHONE ENCOUNTER
[employer] form completed and placed on Dr. Alejo's desk to review and sign.  I have also attached all clinicals and labs.

## 2018-02-07 NOTE — PROGRESS NOTES
Subjective:       Patient ID: Manasa Hollins is a 42 y.o. female with Sjogren's syndrome.     Chief Complaint: No chief complaint on file.    Returns for follow-up. Last seen on . Has Sjogren's with + serology & marked hypergammaglobulinemia but with IgG2 deficiency and recurrent ear & sinus infections. She was prescribed Hizentra (immune globulin) SC and started it at the end of December, 2016.  She also has anemia, most likely of chronic disease. She has been seen by hematology who felt anemia was due to chronic inflammation with associated abnormal utilization of iron.    Sjogren's wise today she states she is doing very well. She is still on HCQ and has eye exams and pilocarpine 5 mg qid which she supplements with OTC products. She would like to take more if possible. Nights and in AM worse. She no longer has had parotid gland swelling. She uses OTC drops for dry eyes. (She is followed by outside ophthalmologist for both Sjogren's & HCQ use (400 mg/d). Was told she does not have eye staining.)   Swallowing is ok. AM stiffness x 5-10 minutes,. Denies joint pain except still for infrequent  bilateral knee pain at times associated with doing too much which she now puts hot wax on and this helps; denies joint swelling, Raynaud's, tight skin, alopecia, oral ulcers, pericarditis, photosensitivity, skin rashes, miscarriages (but had one premature birth at 26.5 weeks) & thromboses.    Her main problem still is non restorative sleep and severe fatigue. Is using sleepy time tea extra + melatonin up to 5 mg lavender candles. Has no problem going to sleep, but cannot stay asleep. Is now exercising religiously and it's helping. Anxiety and stress improved.    Pertinent hx from previous visit:   40 yr old lady with chronic sinusitis with dry nose, mouth and & dry eyes (keratitis documented-photo displayed on 10/12/15 visit) initially sent to rule out Sjogren's after she requested testing and was found to have a +MANISHA  1:250 sp with +SSA & +SSB. Found to have non tender parotid gland enlargement.  She was previously begun on pilocarpine 5 mg qid  Sxs remain the same--she minimizes them. She did not check with pulmonary as she was admitted in November with fever and bilateral otomastoiditis. She is now improved. Gets does get frequent dental cleaning            She reports no joint swelling. Associated symptoms include fatigue and headaches. Pertinent negatives include no fever, trouble swallowing or myalgias.          Current Outpatient Prescriptions   Medication Sig Dispense Refill    albuterol (PROAIR HFA) 90 mcg/actuation inhaler INHALE 2 PUFFS INTO THE LUNGS EVERY 4 HOURS AS NEEDED 8.5 g 11    albuterol 90 mcg/actuation inhaler Inhale 2 puffs into the lungs every 4 (four) hours as needed for Wheezing or Shortness of Breath. 1 Inhaler 3    b complex vitamins tablet Take 1 tablet by mouth every morning.       CALCIUM CARBONATE/VITAMIN D3 (VITAMIN D-3 ORAL) Take 4,000 Units by mouth every morning.       calcium-magnesium-zinc 333-133-5 mg Tab Take 1 tablet by mouth every morning.       CARBOXYMETHYLCELLULOSE SODIUM (REFRESH TEARS OPHT) Apply 2 drops to eye as needed.       cholecalciferol, vitamin D3, (VITAMIN D3) 5,000 unit Tab Take 5,000 Units by mouth every morning.      fluticasone (FLONASE) 50 mcg/actuation nasal spray 2 sprays (100 mcg total) by Each Nare route once daily. 16 g 2    hydroxychloroquine (PLAQUENIL) 200 mg tablet Take 2 tablets (400 mg total) by mouth once daily. (Patient taking differently: Take 400 mg by mouth every morning. ) 120 tablet 3    immun glob G,IgG,-pro-IgA 0-50 (HIZENTRA) 10 gram/50 mL (20 %) Soln Inject 10 g into the skin once a week. (Patient taking differently: Inject 10 g into the skin once a week. Take on Friday evenings) 4 vial 11    krill-om-3-dha-epa-phospho-ast (MEGARED OMEGA-3 KRILL OIL) 224-62-20-50 mg Cap Take by mouth.      levocetirizine (XYZAL) 5 MG tablet TAKE 1 TABLET  BY MOUTH EVERY EVENING (Patient taking differently: TAKE 1 TABLET BY MOUTH EVERY EVENING, at bedtime) 30 tablet 1    linaclotide (LINZESS) 145 mcg Cap capsule Take 1 capsule (145 mcg total) by mouth once daily. 90 capsule 3    omeprazole (PRILOSEC) 40 MG capsule Take 1 capsule (40 mg total) by mouth every morning. 90 capsule 3    pilocarpine (SALAGEN) 5 MG Tab Take 1 tablet (5 mg total) by mouth 4 (four) times daily. 360 tablet 3    PREVIDENT 5000 PLUS 1.1 % Crea       saliva stimulant agents comb.3 (BIOTENE MOISTURIZING MOUTH) Spry by Mucous Membrane route as needed.       SALIVA SUBSTITUTION COMBO NO.9 (BIOTENE DRY MOUTH ORAL RINSE MM) by Mucous Membrane route as needed.       UNABLE TO FIND Take 1 tablet by mouth every morning. Kavin red vitamins 500 mg      vitamin A 8000 UNIT capsule Take 8,000 Units by mouth every morning.        No current facility-administered medications for this visit.          Review of Systems   Constitutional: Positive for fatigue. Negative for diaphoresis and fever.   HENT: Negative for mouth sores, sore throat, tinnitus and trouble swallowing.         Dry mouth   Eyes: Positive for redness. Negative for visual disturbance.        Dry eyes   Respiratory: Positive for cough and shortness of breath (warm weather makes her pant; maybe allergic to oak trees). Negative for choking and chest tightness.    Cardiovascular: Negative for palpitations and leg swelling.   Gastrointestinal: Positive for constipation (helped by linzess). Negative for abdominal distention, abdominal pain, blood in stool, diarrhea, nausea and vomiting.        Heartburn   Endocrine: Positive for cold intolerance.   Genitourinary: Negative.  Negative for frequency, hematuria and menstrual problem.   Musculoskeletal: Positive for arthralgias (knees without swelling.). Negative for back pain, joint swelling, myalgias, neck pain and neck stiffness.   Skin: Negative for color change and rash.   Allergic/Immunologic:  "Negative.    Neurological: Positive for headaches. Negative for dizziness, syncope, weakness, light-headedness and numbness.   Hematological: Negative.  Negative for adenopathy. Does not bruise/bleed easily.   Psychiatric/Behavioral: Positive for sleep disturbance. Negative for dysphoric mood. The patient is nervous/anxious.          Review of patient's allergies indicates:   Allergen Reactions    Sulfa dyne      Other reaction(s): CAUSES ASTHMA ATTACK         Past Surgical History:   Procedure Laterality Date     SECTION      x4    MYOMECTOMY      TUBAL LIGATION      done during myometomy surgery.    upper gi  2016         Family History   Problem Relation Age of Onset    Cancer Mother      Lung/Cervical    Cancer Maternal Grandmother      Breast/Cervical    Cancer Other      Breast     Asthma Child     Emphysema Neg Hx     Colon cancer Neg Hx     Stomach cancer Neg Hx     Esophageal cancer Neg Hx     Ulcerative colitis Neg Hx     Crohn's disease Neg Hx     Irritable bowel syndrome Neg Hx     Celiac disease Neg Hx    Mom & Dad both had sickle cell trait.  Mom  with lung cancer (non smoker) & had cervical cancer and another cancer as well.    Social History   Substance Use Topics    Smoking status: Never Smoker    Smokeless tobacco: Never Used    Alcohol use No     Works as a  for [employer]  Also going to school for medical billing-graduating in April and needs to take an exam.   Objective:   /63   Pulse 80   Ht 5' 4" (1.626 m)   Wt 93.2 kg (205 lb 6.4 oz)   BMI 35.26 kg/m²   Was 208 lbs 14.4 oz on 3/28/17  Was 198 lbs 12.8 oz on 16.  Was 191 lbs 6.4 oz on 16.   Physical Exam   Vitals reviewed.  Constitutional: She is oriented to person, place, and time and well-developed, well-nourished, and in no distress. No distress.   HENT:   Head: Normocephalic and atraumatic.   Mouth/Throat: Oropharynx is clear and moist. No oropharyngeal exudate. "   No facial rashes  Parotids minimally enlarged &, non tender  Lacrimals minimally enlarged  Decreased  moisture of oral mucosa   No oral ulcers  Teeth in good repair   Eyes: Conjunctivae and EOM are normal. Pupils are equal, round, and reactive to light. Right eye exhibits no discharge. Left eye exhibits no discharge. No scleral icterus.   Neck: Neck supple. No JVD present. No tracheal deviation present. No thyromegaly present.   Cardiovascular: Normal rate, regular rhythm, normal heart sounds and intact distal pulses.  Exam reveals no gallop and no friction rub.    No murmur heard.  Pulmonary/Chest: Breath sounds normal. No respiratory distress. She has no wheezes. She has no rales. She exhibits no tenderness.   Abdominal: Soft. Bowel sounds are normal. She exhibits no distension and no mass. There is no splenomegaly or hepatomegaly. There is no tenderness. There is no rebound and no guarding.   Lymphadenopathy:     She has no cervical adenopathy.        Right: No inguinal adenopathy present.        Left: No inguinal adenopathy present.   Neurological: She is alert and oriented to person, place, and time. She has normal reflexes. No cranial nerve deficit. Gait normal.   Proximal and distal muscle strength 5/5 except for 4+/5 proximal LE; can squat but cannot get up without falling.   Skin: Skin is warm and dry. No rash noted. She is not diaphoretic.     Psychiatric: Mood, memory, affect and judgment normal.   Musculoskeletal: Normal range of motion. She exhibits no edema or tenderness.   Cspine FROM no tenderness  Tspine FROM no tenderness  Lspine FROM no tenderness.  TMJ: unremarkable  Shoulders: FROM; no synovitis;  Elbows: FROM; no synovitis; no tophi or nodules  Wrists: FROM; no synovitis;    MCPs: FROM; Mild enlargement of 1st MCP on left;no synovitis; no metacarpalgia;  ok;  PIPs: FROM; no synovitis; no tenderness of any joints.   DIPs: FROM; no synovitis;   HIPS: FROM  Knees: FROM; no crepitus; no  synovitis; no instability;  Ankles: FROM: no synovitis   Toes: ok; no metatarsalgia             LABS:  17: CMP TP 10.0; Alb. 3.0;  17: Hg 8.9; Ht 28.1  17: ; CRP 4.6; Hg 9.4; Ht 28.9; Na 133; TP 10.1; Alb. 2.6;   16: Hg 9.8; Ht 29.6; Na 135; Alb. 2.9;   16: ; CRP 3.9; Hg 8.2; Ht 25.2; CMP TP 9.4; Alb. 2.4; Ca 8.4; CPK 88; SPE elevated TP & gammaglobulins with oligoclonal bands.   16: ;   5/3/16: Hg 8.9; Ht 27.5; IgG1 3490 (1140); IgG2 56 (150);  16: ; CRP 6.7; Hg 9.1; Ht 28.7; CMP Alb. 2.8; TP 9.4; UA ok; U pr/cnne .37 (.20)  3/11/16: CMP Ca 8.2; Alb. 1.4  3/7/16: UA 3+ pr; 2+ OB; * RBCs 16 WBC, 9 HC  12/1/15: Hg 7.3; Ht 22.5; K+ 3.4; Ca 8.6;   8/18/15: UA ok;  8/12/15: LAC neg; aCLA neg; C3, C4 ok; dsDNA neg;       Imagin16: Bilateral knee x-rays: personally reviewed: very mild bilateral DJD.  16: CT chest: personally reviewed: unremarkable.  16: CXR: personally reviewed: prob. Ok; RLL opacity c/w scarring  10/12/15: CXR: personally reviewed with patient: OK  10/12/15: Bilateral Hands: personally reviewed with patient: ok  Assessment:   Sjogren's syndrome -- stable/improved    Sicca sxs with dry mouth with dental carries, dry eyes & dry mouth     Response to pilocarpine      But cough & mild SOB following--patient opts to continue    Bilateral parotid gland swelling    +MANISHA 1:2560; +SSA; +SSB: TP 10.1;     RF neg; LAC, aCLA neg (premature birth at 26.5 wks).     C3, C4, UA ok; elevated IgG 4489 (1600); IgA 495 (350);     + FH SLE (sister)    Anemia:  NCNC--chronic; probably mostly chronic disease (according to ) with mild iron deficiency   Hg electrophoresis A2   Lowest Ht 18.1; highest 34.6   Mom & Dad had sickle cell trait   Some component of iron deficiency    retic ok; MMA ok;    G6PD ok; hapto not low    Non restorative sleep associated with fatigue & features of FMS    Bilateral knee pain/weakness--stable   Imaging  "with minimal bilateral OA    Recurrent ENT & possibly pulmonary infections associated with IgG2 deficiency   S/P bilateral otomastoiditis with fever hospitalized 11/15    Possibly parotidomegaly played a role.   Pulmonary issues:     S/P CAP 3/17/16:     Also with chronic cough & SOB     She's had "allergic cough" in past.     Salagen as a culprit ruled out     CT chest 5/2/16: unremarkable   Inadequate response to pneumovax   IgG2 deficiency on Hizentra     Reactive Airways/Asthma    Some response to advair    Obesity--mild improvement      Plan:   Stay on  mg/d with eye exams.  Continue but switch to pilocarpine 7.5 mg in AM & at bedtime and 5 mg bid in between. Rx for 7.5 mg tab given sigged as qid.  Continue OTC for dry mouth and eyes prn  Continue aerobic exercise  Counseled on weight loss--again.  12 hr fast.  RTC 6 months or prn.            CC: Tomi Myers MD  "

## 2018-02-08 ENCOUNTER — OFFICE VISIT (OUTPATIENT)
Dept: RHEUMATOLOGY | Facility: CLINIC | Age: 43
End: 2018-02-08
Payer: COMMERCIAL

## 2018-02-08 VITALS
HEART RATE: 80 BPM | WEIGHT: 205.38 LBS | BODY MASS INDEX: 35.06 KG/M2 | SYSTOLIC BLOOD PRESSURE: 115 MMHG | DIASTOLIC BLOOD PRESSURE: 63 MMHG | HEIGHT: 64 IN

## 2018-02-08 DIAGNOSIS — Z79.899 LONG-TERM USE OF HYDROXYCHLOROQUINE: ICD-10-CM

## 2018-02-08 DIAGNOSIS — E66.9 OBESITY (BMI 30-39.9): ICD-10-CM

## 2018-02-08 DIAGNOSIS — Z55.9 EDUCATIONAL CIRCUMSTANCE: ICD-10-CM

## 2018-02-08 DIAGNOSIS — K11.1 PAROTID GLAND ENLARGEMENT: ICD-10-CM

## 2018-02-08 DIAGNOSIS — M35.09 SJOGREN'S SYNDROME WITH OTHER ORGAN INVOLVEMENT: Primary | ICD-10-CM

## 2018-02-08 PROCEDURE — 99999 PR PBB SHADOW E&M-EST. PATIENT-LVL III: CPT | Mod: PBBFAC,,, | Performed by: INTERNAL MEDICINE

## 2018-02-08 PROCEDURE — 99213 OFFICE O/P EST LOW 20 MIN: CPT | Performed by: INTERNAL MEDICINE

## 2018-02-08 PROCEDURE — 99214 OFFICE O/P EST MOD 30 MIN: CPT | Mod: S$GLB,,, | Performed by: INTERNAL MEDICINE

## 2018-02-08 PROCEDURE — 3008F BODY MASS INDEX DOCD: CPT | Mod: S$GLB,,, | Performed by: INTERNAL MEDICINE

## 2018-02-08 RX ORDER — PILOCARPINE HYDROCHLORIDE 7.5 MG/1
7.5 TABLET, FILM COATED ORAL 4 TIMES DAILY
Qty: 120 TABLET | Refills: 11 | Status: SHIPPED | OUTPATIENT
Start: 2018-02-08 | End: 2019-02-22 | Stop reason: SDUPTHER

## 2018-02-08 RX ORDER — HYDROXYCHLOROQUINE SULFATE 200 MG/1
400 TABLET, FILM COATED ORAL DAILY
Qty: 120 TABLET | Refills: 3 | Status: SHIPPED | OUTPATIENT
Start: 2018-02-08 | End: 2018-08-30 | Stop reason: SDUPTHER

## 2018-02-08 SDOH — SOCIAL DETERMINANTS OF HEALTH (SDOH): PROBLEMS RELATED TO EDUCATION AND LITERACY, UNSPECIFIED: Z55.9

## 2018-02-08 ASSESSMENT — ROUTINE ASSESSMENT OF PATIENT INDEX DATA (RAPID3)
PSYCHOLOGICAL DISTRESS SCORE: 2.2
PATIENT GLOBAL ASSESSMENT SCORE: 0
AM STIFFNESS SCORE: 1, YES
WHEN YOU AWAKENED IN THE MORNING OVER THE LAST WEEK, PLEASE INDICATE THE AMOUNT OF TIME IT TAKES UNTIL YOU ARE AS LIMBER AS YOU WILL BE FOR THE DAY: 10 MINUTES
TOTAL RAPID3 SCORE: 1.44
PAIN SCORE: 3
MDHAQ FUNCTION SCORE: .4
FATIGUE SCORE: 8

## 2018-02-08 NOTE — PATIENT INSTRUCTIONS
Keep up the exercising.  Daily, aerobic, low impact.  Weight loss: eat a good breakfast & a good lunch but each at dinner and don't eat anything x 12 hrs.    Discuss trying trazodone to sleep with your primary care MD.

## 2018-06-11 ENCOUNTER — OFFICE VISIT (OUTPATIENT)
Dept: FAMILY MEDICINE | Facility: CLINIC | Age: 43
End: 2018-06-11
Payer: COMMERCIAL

## 2018-06-11 VITALS
HEART RATE: 105 BPM | SYSTOLIC BLOOD PRESSURE: 102 MMHG | DIASTOLIC BLOOD PRESSURE: 70 MMHG | TEMPERATURE: 99 F | WEIGHT: 204.5 LBS | BODY MASS INDEX: 34.91 KG/M2 | HEIGHT: 64 IN | OXYGEN SATURATION: 99 %

## 2018-06-11 DIAGNOSIS — R05.9 COUGH: ICD-10-CM

## 2018-06-11 DIAGNOSIS — J32.4 CHRONIC PANSINUSITIS: Primary | ICD-10-CM

## 2018-06-11 PROCEDURE — 3008F BODY MASS INDEX DOCD: CPT | Mod: CPTII,S$GLB,, | Performed by: NURSE PRACTITIONER

## 2018-06-11 PROCEDURE — 99214 OFFICE O/P EST MOD 30 MIN: CPT | Mod: S$GLB,,, | Performed by: NURSE PRACTITIONER

## 2018-06-11 PROCEDURE — 99999 PR PBB SHADOW E&M-EST. PATIENT-LVL V: CPT | Mod: PBBFAC,,, | Performed by: NURSE PRACTITIONER

## 2018-06-11 RX ORDER — PROMETHAZINE HYDROCHLORIDE AND DEXTROMETHORPHAN HYDROBROMIDE 6.25; 15 MG/5ML; MG/5ML
5 SYRUP ORAL
Qty: 180 ML | Refills: 0 | Status: SHIPPED | OUTPATIENT
Start: 2018-06-11 | End: 2018-06-21

## 2018-06-11 RX ORDER — FLUORIDE (SODIUM) 0.02 %
SOLUTION, NON-ORAL DENTAL
Refills: 0 | COMMUNITY
Start: 2018-03-29 | End: 2021-10-12 | Stop reason: SDUPTHER

## 2018-06-11 RX ORDER — AMOXICILLIN 500 MG/1
500 TABLET, FILM COATED ORAL 2 TIMES DAILY
Qty: 14 TABLET | Refills: 0 | Status: SHIPPED | OUTPATIENT
Start: 2018-06-11 | End: 2018-06-18

## 2018-06-11 NOTE — PATIENT INSTRUCTIONS
Viral Upper Respiratory Illness (Adult)  You have a viral upper respiratory illness (URI), which is another term for the common cold. This illness is contagious during the first few days. It is spread through the air by coughing and sneezing. It may also be spread by direct contact (touching the sick person and then touching your own eyes, nose, or mouth). Frequent handwashing will decrease risk of spread. Most viral illnesses go away within 7 to 10 days with rest and simple home remedies. Sometimes the illness may last for several weeks. Antibiotics will not kill a virus, and they are generally not prescribed for this condition.    Home care  · If symptoms are severe, rest at home for the first 2 to 3 days. When you resume activity, don't let yourself get too tired.  · Avoid being exposed to cigarette smoke (yours or others).  · You may use acetaminophen or ibuprofen to control pain and fever, unless another medicine was prescribed. (Note: If you have chronic liver or kidney disease, have ever had a stomach ulcer or gastrointestinal bleeding, or are taking blood-thinning medicines, talk with your healthcare provider before using these medicines.) Aspirin should never be given to anyone under 18 years of age who is ill with a viral infection or fever. It may cause severe liver or brain damage.  · Your appetite may be poor, so a light diet is fine. Avoid dehydration by drinking 6 to 8 glasses of fluids per day (water, soft drinks, juices, tea, or soup). Extra fluids will help loosen secretions in the nose and lungs.  · Over-the-counter cold medicines will not shorten the length of time youre sick, but they may be helpful for the following symptoms: cough, sore throat, and nasal and sinus congestion. (Note: Do not use decongestants if you have high blood pressure.)  Follow-up care  Follow up with your healthcare provider, or as advised.  When to seek medical advice  Call your healthcare provider right away if any  of these occur:  · Cough with lots of colored sputum (mucus)  · Severe headache; face, neck, or ear pain  · Difficulty swallowing due to throat pain  · Fever of 100.4°F (38°C)  Call 911, or get immediate medical care  Call emergency services right away if any of these occur:  · Chest pain, shortness of breath, wheezing, or difficulty breathing  · Coughing up blood  · Inability to swallow due to throat pain  Date Last Reviewed: 9/13/2015  © 8605-8040 [employer]. [address] [place] [address]. All rights reserved. This information is not intended as a substitute for professional medical care. Always follow your healthcare professional's instructions.

## 2018-06-11 NOTE — PROGRESS NOTES
"Subjective:       Patient ID: Manasa Hollins is a 42 y.o. female.    Chief Complaint: URI (headache,sneezing,runny nose,coughing up yellow mucus,chest pain due to cough X wednesday night )    URI    This is a recurrent problem. The current episode started in the past 7 days. The problem has been gradually worsening. There has been no fever. Associated symptoms include coughing, ear pain, rhinorrhea and sneezing. Pertinent negatives include no congestion, headaches or sore throat. Treatments tried: zyrtec and nasal rinse and flonase.       Past Medical History:   Diagnosis Date    Asthma     General anesthetics causing adverse effect in therapeutic use     GERD (gastroesophageal reflux disease)     Iron deficiency anemia     Sjogren's disease        Social History     Social History    Marital status:      Spouse name: N/A    Number of children: N/A    Years of education: N/A     Occupational History    Not on file.     Social History Main Topics    Smoking status: Never Smoker    Smokeless tobacco: Never Used    Alcohol use No    Drug use: No    Sexual activity: No     Other Topics Concern    Not on file     Social History Narrative    No narrative on file       Past Surgical History:   Procedure Laterality Date     SECTION      x4    MYOMECTOMY      TUBAL LIGATION      done during myometomy surgery.    upper gi  2016       Review of Systems   Constitutional: Negative for chills and fever.   HENT: Positive for ear pain, postnasal drip, rhinorrhea, sinus pressure and sneezing. Negative for congestion, sore throat and trouble swallowing.    Respiratory: Positive for cough.    Neurological: Negative for headaches.   All other systems reviewed and are negative.      Objective:   /70 (BP Location: Right arm, Patient Position: Sitting, BP Method: Medium (Manual))   Pulse 105   Temp 98.8 °F (37.1 °C) (Oral)   Ht 5' 4" (1.626 m)   Wt 92.8 kg (204 lb 7.6 oz)   LMP " 05/15/2018   SpO2 99%   BMI 35.10 kg/m²      Physical Exam   Constitutional: She is oriented to person, place, and time. She appears well-developed and well-nourished. She is cooperative.   HENT:   Head: Normocephalic and atraumatic.   Right Ear: Hearing, tympanic membrane, external ear and ear canal normal.   Left Ear: Hearing, tympanic membrane, external ear and ear canal normal.   Nose: No rhinorrhea. Right sinus exhibits maxillary sinus tenderness and frontal sinus tenderness. Left sinus exhibits maxillary sinus tenderness and frontal sinus tenderness.   Mouth/Throat: No oropharyngeal exudate, posterior oropharyngeal edema or posterior oropharyngeal erythema.   +PND   Cardiovascular: Normal rate and regular rhythm.    Pulmonary/Chest: Effort normal and breath sounds normal. No respiratory distress. She has no decreased breath sounds. She has no wheezes. She has no rhonchi. She has no rales.   Lymphadenopathy:     She has no cervical adenopathy.   Neurological: She is alert and oriented to person, place, and time.   Skin: Skin is warm, dry and intact. She is not diaphoretic. No pallor.   Psychiatric: She has a normal mood and affect. Her speech is normal and behavior is normal.   Vitals reviewed.      Assessment:       1. Chronic pansinusitis    2. Cough        Plan:       Manasa was seen today for uri.    Diagnoses and all orders for this visit:    Chronic pansinusitis  -     amoxicillin (AMOXIL) 500 MG Tab; Take 1 tablet (500 mg total) by mouth 2 (two) times daily.    Cough  -     promethazine-dextromethorphan (PROMETHAZINE-DM) 6.25-15 mg/5 mL Syrp; Take 5 mLs by mouth every 4 to 6 hours as needed.  -     Increase your water intake to 64-80 oz daily to help thin mucus  -     Nasal Saline spray (Over the counter [place] spray or [place])  2 sprays each nostril 2-3 times a day for nasal congestion  -     Tylenol 500 mg 2 tablets or Ibuprofen 200 mg 2 tablets every 4-6 hours as needed for fever, headaches, sore  throat, ear pain, bodyaches, and/or nasal/sinus inflammation      Follow-up if symptoms worsen or fail to improve.

## 2018-06-13 ENCOUNTER — PATIENT MESSAGE (OUTPATIENT)
Dept: FAMILY MEDICINE | Facility: CLINIC | Age: 43
End: 2018-06-13

## 2018-06-13 RX ORDER — ALBUTEROL SULFATE 90 UG/1
AEROSOL, METERED RESPIRATORY (INHALATION)
Qty: 8.5 G | Refills: 11 | Status: SHIPPED | OUTPATIENT
Start: 2018-06-13 | End: 2020-03-16 | Stop reason: SDUPTHER

## 2018-06-13 RX ORDER — ALBUTEROL SULFATE 90 UG/1
AEROSOL, METERED RESPIRATORY (INHALATION)
Qty: 8.5 G | Refills: 11 | Status: SHIPPED | OUTPATIENT
Start: 2018-06-13 | End: 2019-07-11 | Stop reason: SDUPTHER

## 2018-06-13 RX ORDER — LEVOCETIRIZINE DIHYDROCHLORIDE 5 MG/1
5 TABLET, FILM COATED ORAL NIGHTLY
Qty: 30 TABLET | Refills: 1 | Status: SHIPPED | OUTPATIENT
Start: 2018-06-13 | End: 2019-07-11

## 2018-06-13 RX ORDER — LEVOCETIRIZINE DIHYDROCHLORIDE 5 MG/1
5 TABLET, FILM COATED ORAL NIGHTLY
Qty: 30 TABLET | Refills: 11 | Status: SHIPPED | OUTPATIENT
Start: 2018-06-13 | End: 2018-11-07 | Stop reason: SDUPTHER

## 2018-06-28 ENCOUNTER — OFFICE VISIT (OUTPATIENT)
Dept: ALLERGY | Facility: CLINIC | Age: 43
End: 2018-06-28
Payer: COMMERCIAL

## 2018-06-28 VITALS
WEIGHT: 206.81 LBS | HEIGHT: 64 IN | DIASTOLIC BLOOD PRESSURE: 70 MMHG | BODY MASS INDEX: 35.31 KG/M2 | SYSTOLIC BLOOD PRESSURE: 110 MMHG

## 2018-06-28 DIAGNOSIS — K21.9 GASTROESOPHAGEAL REFLUX DISEASE WITHOUT ESOPHAGITIS: ICD-10-CM

## 2018-06-28 DIAGNOSIS — D80.6 ANTI-POLYSACCHARIDE ANTIBODY DEFICIENCY: Primary | ICD-10-CM

## 2018-06-28 DIAGNOSIS — J01.91 ACUTE RECURRENT SINUSITIS, UNSPECIFIED LOCATION: ICD-10-CM

## 2018-06-28 PROCEDURE — 3008F BODY MASS INDEX DOCD: CPT | Mod: CPTII,S$GLB,, | Performed by: ALLERGY & IMMUNOLOGY

## 2018-06-28 PROCEDURE — 99999 PR PBB SHADOW E&M-EST. PATIENT-LVL II: CPT | Mod: PBBFAC,,, | Performed by: ALLERGY & IMMUNOLOGY

## 2018-06-28 PROCEDURE — 99214 OFFICE O/P EST MOD 30 MIN: CPT | Mod: S$GLB,,, | Performed by: ALLERGY & IMMUNOLOGY

## 2018-06-28 RX ORDER — AMOXICILLIN AND CLAVULANATE POTASSIUM 875; 125 MG/1; MG/1
1 TABLET, FILM COATED ORAL 2 TIMES DAILY
Qty: 10 TABLET | Refills: 0 | Status: SHIPPED | OUTPATIENT
Start: 2018-06-28 | End: 2018-09-04

## 2018-06-28 NOTE — PROGRESS NOTES
Subjective:       Patient ID: Manasa Hollins is a 42 y.o. female.    LV 6/1/17    Chief Complaint:  Follow-up (6 month visit)  fu specific antibody deficiency    HPI:     Continues Hizentra 10 g weekly (430 mg/kg/mo).   Over last 2-3 weeks has had increased sinus, facial presssure, nasal congestion, cough. Seen at urgent care 6/11. No improvement w symptomatic care        Past Medical History:   Diagnosis Date    Asthma     General anesthetics causing adverse effect in therapeutic use     GERD (gastroesophageal reflux disease)     Iron deficiency anemia     Sjogren's disease    GERD  anemia      Family History   Problem Relation Age of Onset    Cancer Mother         Lung/Cervical    Cancer Maternal Grandmother         Breast/Cervical    Cancer Other         Breast     Asthma Child     Emphysema Neg Hx     Colon cancer Neg Hx     Stomach cancer Neg Hx     Esophageal cancer Neg Hx     Ulcerative colitis Neg Hx     Crohn's disease Neg Hx     Irritable bowel syndrome Neg Hx     Celiac disease Neg Hx           Environmental History: Pets in the home: dogs (1).  [place]: hardwood floors  Tobacco Smoke in Home: yes    smokes outside    Review of Systems   Constitutional: Negative for appetite change, chills, fever and unexpected weight change.   HENT: Positive for congestion, postnasal drip and sinus pressure. Negative for ear pain and facial swelling.    Eyes: Negative for photophobia, discharge and visual disturbance.   Respiratory: Positive for cough. Negative for chest tightness, shortness of breath and wheezing.    Cardiovascular: Negative for chest pain and palpitations.   Gastrointestinal: Negative for abdominal distention.   Musculoskeletal: Negative for arthralgias and joint swelling.   Neurological: Negative for dizziness and headaches.   Hematological: Negative for adenopathy. Does not bruise/bleed easily.   Psychiatric/Behavioral: Negative for behavioral problems and sleep  disturbance.        Objective:    Physical Exam   Constitutional: She is oriented to person, place, and time. She appears well-developed and well-nourished. No distress.   HENT:   Head: Normocephalic.   Right Ear: Hearing, tympanic membrane and ear canal normal.   Left Ear: Hearing, tympanic membrane and ear canal normal.   Nose: No mucosal edema, rhinorrhea, sinus tenderness or septal deviation. Right sinus exhibits maxillary sinus tenderness and frontal sinus tenderness. Left sinus exhibits maxillary sinus tenderness and frontal sinus tenderness.   Mouth/Throat: Uvula is midline, oropharynx is clear and moist and mucous membranes are normal. No uvula swelling.   Eyes: Conjunctivae are normal. Right eye exhibits no discharge. Left eye exhibits no discharge.   Neck: Normal range of motion. No thyromegaly present.   Cardiovascular: Normal rate and regular rhythm.    No murmur heard.  Pulmonary/Chest: Effort normal and breath sounds normal. No respiratory distress. She has no wheezes.   Abdominal: Soft. She exhibits no distension. There is no tenderness. There is no guarding.   Musculoskeletal: Normal range of motion. She exhibits no edema or tenderness.   Lymphadenopathy:     She has no cervical adenopathy.   Neurological: She is alert and oriented to person, place, and time.   Skin: Skin is warm. No rash noted. No erythema.   Psychiatric: She has a normal mood and affect. Her behavior is normal.   Nursing note and vitals reviewed.      Laboratory:      immunoCAPs pos to dust mites and cockroach  Results for BRIDGER SHERIDAN (MRN [id number]) as of 5/19/2016 13:24   Ref. Range 4/29/2016 11:30   IgG - Serum Latest Ref Range: 650 - 1600 mg/dL 4489 (H)   IgM Latest Ref Range: 50 - 300 mg/dL 50   IgA Latest Ref Range: 40 - 350 mg/dL 495 (H)   IgE Latest Ref Range: 0 - 100 IU/mL 84     Results for BRIDGER SHERIDAN (MRN [id number]) as of 9/7/2016 09:19   Ref. Range 5/3/2016 10:53   IgG 1 Latest Ref Range: 490 -  1140 mg/dL 3,490 (H)   IgG 2 Latest Ref Range: 150 - 640 mg/dL 56 (L)   IgG 3 Latest Ref Range: 11 - 85 mg/dL 47   IgG 4 Latest Ref Range: 3 - 201 mg/dL 38     Results for BRIDGER SHERIDAN (MRN [id number]) as of 9/7/2016 09:19   Ref. Range 4/29/2016 11:30 7/2/2016 11:12 7/5/2016 09:01 8/24/2016 08:17   S.pneumoniae Type 1 Latest Units: mcg/mL <0.3  0.4 0.7   S.pneumoniae Type 3 Latest Units: mcg/mL <0.3  <0.3 3.5   Strep pneumo Type 4 Latest Units: mcg/mL <0.3  <0.3 <0.3   S.pneumoniae Type 5 Latest Units: mcg/mL <0.3  <0.3 <0.3   S.pneumoniae Type 6B Latest Units: mcg/mL <0.3  <0.3 <0.3   S.pneumoniae Type 7F Latest Units: mcg/mL 2.3  3.0 6.1   S.pneumoniae Type 8 Latest Units: mcg/mL <0.3  <0.3 <0.3   S.pneumoniae Type 9N Latest Units: mcg/mL <0.3  <0.3 0.6   S.pneumoniae Type 9V Abs Latest Units: mcg/mL <0.3  <0.3 0.3   S.pneumoniae Type 12F Latest Units: mcg/mL <0.3  <0.3 <0.3   Strep pneumo Type 14 Latest Units: mcg/mL <0.3  0.5 0.7   S.pneumoniae Type 18C Latest Units: mcg/mL <0.3  0.3 0.3   S.pneumoniae Type 19F Latest Units: mcg/mL <0.3  0.4 0.5   S.pneumoniae Type 23F Latest Units: mcg/mL <0.3  <0.3 <0.3       CT Sinuses 12/17/16    Impression:  Postsurgical changes of prior functional endoscopic sinus surgery. Left medial antrostomy is patent, but the right is now obstructed.  Significant increase in mucosal thickening or fluid opacification of paranasal sinuses as above. Hyperattenuating material within the right frontal sinus can be seen with inspissated secretions or fungal elements.     Question left-sided nasal polyposis.      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 Sjogren's   3. GERD   4. sinusitis         Plan:       1. Continue weekly SQ IgG replacement, Hizentra, 10 g SQ weekly (~430 mg/kg/mo).   2. flonase  3. Sinus rinses  4.  xyzal prn  5. Cont prilosec for GERD. Keep GI fu   6. augmentin  If no improvement, consider increase hizentra dose

## 2018-07-01 PROBLEM — D80.6 ANTI-POLYSACCHARIDE ANTIBODY DEFICIENCY: Status: ACTIVE | Noted: 2018-07-01

## 2018-07-20 ENCOUNTER — TELEPHONE (OUTPATIENT)
Dept: ALLERGY | Facility: CLINIC | Age: 43
End: 2018-07-20

## 2018-07-20 NOTE — TELEPHONE ENCOUNTER
----- Message from Olivia Vega MA sent at 7/19/2018 11:31 AM CDT -----  Contact: [facility] Fax/[phone]  Pharmacy is requesting updated clinicals for Hizentra rx. Please advise.    Thanks

## 2018-07-30 ENCOUNTER — TELEPHONE (OUTPATIENT)
Dept: ALLERGY | Facility: CLINIC | Age: 43
End: 2018-07-30

## 2018-07-30 NOTE — TELEPHONE ENCOUNTER
Received a fax from [facility].    Hizentra rx needs to be signed. Placed on Dr. Alejo's desk.

## 2018-08-06 DIAGNOSIS — J32.4 CHRONIC PANSINUSITIS: ICD-10-CM

## 2018-08-06 RX ORDER — BUDESONIDE 0.5 MG/2ML
0.5 INHALANT ORAL 2 TIMES DAILY
Qty: 180 ML | Refills: 0 | Status: SHIPPED | OUTPATIENT
Start: 2018-08-06 | End: 2018-09-04

## 2018-08-06 NOTE — TELEPHONE ENCOUNTER
Response requesting received from pharmacy.   Request to close potential gap in therapy.    Pharmacy states they have spoke with the patient about asthma care and noticed that patient previously received daily controller therapy (PULMICORT)  but has not yet filled it at [facility] pharmacy.  Your patient would like her pharmacy to reach out on their behalf to determine if it is appropriate to restart the daily controller therapy.    Patient med chart states last refill was on 8/31/17   Please advise.  LOV 6/11/18 CADY Mcginnis.

## 2018-08-11 NOTE — PROGRESS NOTES
Subjective:       Patient ID: Manasa Hollins is a 42 y.o. female with Sjogren's syndrome.     Chief Complaint: No chief complaint on file.    43 yo lady w Sjogren's with + serology & marked hypergammaglobulinemia but with IgG2 deficiency and recurrent ear & sinus infections. She was prescribed Hizentra (immune globulin) SC and started it at the end of December, 2016.  She also has anemia, most likely of chronic disease. She has been seen by hematology who felt anemia was due to chronic inflammation with associated abnormal utilization of iron.      Returns for follow-up. Last seen on 2/6/18. Doing very well as she's increased salagen to 7.5 mg 4 x/ day & her mouth is less dry. She has had parotid gland swelling in the past. No longer. She uses OTC drops for dry eyes. (She is followed by outside ophthalmologist for both Sjogren's & HCQ use (400 mg/d). Was told she does not have eye staining.)  Swallowing is ok. AM stiffness x 30 minutes. Denies joint pain except still for infrequent  bilateral knee pain at times associated with doing too much which she occasionally puts hot wax on and this helps; denies joint swelling, Raynaud's, tight skin, alopecia, oral ulcers, pericarditis, photosensitivity, skin rashes, miscarriages (but had one premature birth at 26.5 weeks) & thromboses.    Is still having some issues with non restorative sleep and severe fatigue. Is still using sleepy time tea extra + melatonin up to 5 mg lavender candles. Has no problem going to sleep, but cannot stay asleep. Is now still exercising religiously and it's helping but cannot lose weight. She and her  work with a  3 x/week and she walks other days. She feels it is helpful. Anxiety and stress improved but still there.     Pertinent hx from previous visit:   40 yr old lady with chronic sinusitis with dry nose, mouth and & dry eyes (keratitis documented-photo displayed on 10/12/15 visit) initially sent to rule out Sjogren's  after she requested testing and was found to have a +MANISHA 1:250 sp with +SSA & +SSB. Found to have non tender parotid gland enlargement.  She was previously begun on pilocarpine 5 mg qid  Sxs remain the same--she minimizes them. She did not check with pulmonary as she was admitted in November with fever and bilateral otomastoiditis. She is now improved. Gets does get frequent dental cleaning            She reports no joint swelling. Associated symptoms include fatigue. Pertinent negatives include no dysuria, fever, trouble swallowing, myalgias or headaches.          Current Outpatient Prescriptions   Medication Sig Dispense Refill    acetaminophen (TYLENOL ARTHRITIS ORAL) Take by mouth.      albuterol (PROAIR HFA) 90 mcg/actuation inhaler INHALE 2 PUFFS INTO THE LUNGS EVERY 4 HOURS AS NEEDED 8.5 g 11    albuterol (PROAIR HFA) 90 mcg/actuation inhaler INHALE 2 PUFFS INTO THE LUNGS EVERY 4 HOURS AS NEEDED 8.5 g 11    amoxicillin-clavulanate 875-125mg (AUGMENTIN) 875-125 mg per tablet Take 1 tablet by mouth 2 (two) times daily. 10 tablet 0    b complex vitamins tablet Take 1 tablet by mouth every morning.       budesonide (PULMICORT) 0.5 mg/2 mL nebulizer solution Take 2 mLs (0.5 mg total) by nebulization 2 (two) times daily. For use in saline irrigation as directed. 180 mL 0    CALCIUM CARBONATE/VITAMIN D3 (VITAMIN D-3 ORAL) Take 4,000 Units by mouth every morning.       calcium-magnesium-zinc 333-133-5 mg Tab Take 1 tablet by mouth every morning.       CARBOXYMETHYLCELLULOSE SODIUM (REFRESH TEARS OPHT) Apply 2 drops to eye as needed.       cholecalciferol, vitamin D3, (VITAMIN D3) 5,000 unit Tab Take 5,000 Units by mouth every morning.      hydroxychloroquine (PLAQUENIL) 200 mg tablet Take 2 tablets (400 mg total) by mouth once daily. 120 tablet 3    immun glob G,IgG,-pro-IgA 0-50 (HIZENTRA) 10 gram/50 mL (20 %) Soln Inject 10 g into the skin once a week. (Patient taking differently: Inject 10 g into the  skin once a week. Take on Friday evenings) 4 vial 11    krill-om-3-dha-epa-phospho-ast (MEGARED OMEGA-3 KRILL OIL) 127-57-16-50 mg Cap Take by mouth.      levocetirizine (XYZAL) 5 MG tablet Take 1 tablet (5 mg total) by mouth nightly. 30 tablet 11    levocetirizine (XYZAL) 5 MG tablet Take 1 tablet (5 mg total) by mouth every evening. 30 tablet 1    linaclotide (LINZESS) 145 mcg Cap capsule Take 1 capsule (145 mcg total) by mouth once daily. 90 capsule 3    omeprazole (PRILOSEC) 40 MG capsule Take 1 capsule (40 mg total) by mouth every morning. 90 capsule 3    pilocarpine HCl (SALAGEN) 7.5 mg tablet Take 1 tablet (7.5 mg total) by mouth 4 (four) times daily. 120 tablet 11    PREVIDENT 0.2 % Soln USE AS DIRECTED  0    PREVIDENT 5000 PLUS 1.1 % Crea       saliva stimulant agents comb.3 (BIOTENE MOISTURIZING MOUTH) Spry by Mucous Membrane route as needed.       SALIVA SUBSTITUTION COMBO NO.9 (BIOTENE DRY MOUTH ORAL RINSE MM) by Mucous Membrane route as needed.       UNABLE TO FIND Take 1 tablet by mouth every morning. Kavin red vitamins 500 mg      vitamin A 8000 UNIT capsule Take 8,000 Units by mouth every morning.        No current facility-administered medications for this visit.          Review of Systems   Constitutional: Positive for fatigue. Negative for diaphoresis, fever and unexpected weight change.   HENT: Negative for mouth sores, sore throat, tinnitus and trouble swallowing.         Dry mouth   Eyes: Positive for redness. Negative for visual disturbance.        Dry eyes   Respiratory: Positive for cough (In AM; ) and shortness of breath (warm weather makes her pant; maybe allergic to oak trees). Negative for choking and chest tightness.    Cardiovascular: Negative for chest pain, palpitations and leg swelling.   Gastrointestinal: Positive for constipation (helped by linzess every other day & exercise. ). Negative for abdominal distention, abdominal pain, blood in stool, diarrhea, nausea and  "vomiting.        Heartburn   Endocrine: Positive for cold intolerance.   Genitourinary: Negative.  Negative for dysuria, frequency, genital sores, hematuria and menstrual problem.   Musculoskeletal: Positive for arthralgias (knees without swelling.). Negative for back pain, joint swelling, myalgias, neck pain and neck stiffness.   Skin: Negative for color change and rash.   Allergic/Immunologic: Negative.    Neurological: Negative for dizziness, syncope, weakness, light-headedness, numbness and headaches.   Hematological: Negative.  Negative for adenopathy. Does not bruise/bleed easily.   Psychiatric/Behavioral: Positive for sleep disturbance. Negative for dysphoric mood. The patient is nervous/anxious.          Review of patient's allergies indicates:   Allergen Reactions    Sulfa dyne      Other reaction(s): CAUSES ASTHMA ATTACK         Past Surgical History:   Procedure Laterality Date     SECTION      x4    MYOMECTOMY      TUBAL LIGATION      done during myometomy surgery.    upper gi  2016         Family History   Problem Relation Age of Onset    Cancer Mother         Lung/Cervical    Cancer Maternal Grandmother         Breast/Cervical    Cancer Other         Breast     Asthma Child     Emphysema Neg Hx     Colon cancer Neg Hx     Stomach cancer Neg Hx     Esophageal cancer Neg Hx     Ulcerative colitis Neg Hx     Crohn's disease Neg Hx     Irritable bowel syndrome Neg Hx     Celiac disease Neg Hx    Mom & Dad both had sickle cell trait.  Mom developed depression.   Mom  with lung cancer (non smoker) & had cervical cancer and another cancer as well.    Social History   Substance Use Topics    Smoking status: Never Smoker    Smokeless tobacco: Never Used    Alcohol use No     Works as a  for [employer]  Graduated school for medical billing-still needs to take an exam.   Also wants to go to school for coding.  Objective:   /69   Pulse 74   Ht 5' 4" " (1.626 m)   Wt 93.4 kg (206 lb)   BMI 35.36 kg/m²   Was 205 lb 6.4 oz on 2/8/18  Was 208 lbs 14.4 oz on 3/28/17  Was 198 lbs 12.8 oz on 11/22/16.  Was 191 lbs 6.4 oz on 7/21/16.   Physical Exam   Vitals reviewed.  Constitutional: She is oriented to person, place, and time and well-developed, well-nourished, and in no distress. No distress.   HENT:   Head: Normocephalic and atraumatic.   Mouth/Throat: Oropharynx is clear and moist. No oropharyngeal exudate.   No facial rashes  Parotids minimally enlarged &, non tender  Lacrimals minimally enlarged  Decreased  moisture of oral mucosa   No oral ulcers  Teeth in good repair   Eyes: Conjunctivae and EOM are normal. Pupils are equal, round, and reactive to light. Right eye exhibits no discharge. Left eye exhibits no discharge. No scleral icterus.   Neck: Neck supple. No JVD present. No tracheal deviation present. No thyromegaly present.   Cardiovascular: Normal rate, regular rhythm, normal heart sounds and intact distal pulses.  Exam reveals no gallop and no friction rub.    No murmur heard.  Pulmonary/Chest: Breath sounds normal. No respiratory distress. She has no wheezes. She has no rales. She exhibits no tenderness.   Abdominal: Soft. Bowel sounds are normal. She exhibits no distension and no mass. There is no splenomegaly or hepatomegaly. There is no tenderness. There is no rebound and no guarding.   Lymphadenopathy:     She has no cervical adenopathy.        Right: No inguinal adenopathy present.        Left: No inguinal adenopathy present.   Neurological: She is alert and oriented to person, place, and time. She has normal reflexes. No cranial nerve deficit. Gait normal.   Proximal and distal muscle strength 5/5 except for 4+/5 proximal LE; can squat but cannot get up without falling.   Skin: Skin is warm and dry. No rash noted. She is not diaphoretic.     Psychiatric: Mood, memory, affect and judgment normal.   Musculoskeletal: Normal range of motion. She  exhibits no edema or tenderness.   Cspine FROM no tenderness  Tspine FROM no tenderness  Lspine FROM no tenderness.  TMJ: unremarkable  Shoulders: FROM; no synovitis;  Elbows: FROM; no synovitis; no tophi or nodules  Wrists: FROM; no synovitis;    MCPs: FROM; Mild enlargement of 1st MCP on left;no synovitis; no metacarpalgia;  ok;  PIPs: FROM; no synovitis; no tenderness of any joints.   DIPs: FROM; no synovitis;   HIPS: FROM  Knees: FROM; no crepitus; no synovitis; no instability;  Ankles: FROM: no synovitis   Toes: ok; no metatarsalgia             LABS:  17: CMP TP 10.0; Alb. 3.0;  17: Hg 8.9; Ht 28.1  17: ; CRP 4.6; Hg 9.4; Ht 28.9; Na 133; TP 10.1; Alb. 2.6;   16: Hg 9.8; Ht 29.6; Na 135; Alb. 2.9;   16: ; CRP 3.9; Hg 8.2; Ht 25.2; CMP TP 9.4; Alb. 2.4; Ca 8.4; CPK 88; SPE elevated TP & gammaglobulins with oligoclonal bands.   16: ;   5/3/16: Hg 8.9; Ht 27.5; IgG1 3490 (1140); IgG2 56 (150);  16: ; CRP 6.7; Hg 9.1; Ht 28.7; CMP Alb. 2.8; TP 9.4; UA ok; U pr/cnne .37 (.20)  3/11/16: CMP Ca 8.2; Alb. 1.4  3/7/16: UA 3+ pr; 2+ OB; * RBCs 16 WBC, 9 HC  12/1/15: Hg 7.3; Ht 22.5; K+ 3.4; Ca 8.6;   8/18/15: UA ok;  8/12/15: LAC neg; aCLA neg; C3, C4 ok; dsDNA neg;       Imagin16: Bilateral knee x-rays: personally reviewed: very mild bilateral DJD.  16: CT chest: personally reviewed: unremarkable.  16: CXR: personally reviewed: prob. Ok; RLL opacity c/w scarring  10/12/15: CXR: personally reviewed with patient: OK  10/12/15: Bilateral Hands: personally reviewed with patient: ok  Assessment:   Sjogren's syndrome -- stable/improved    Sicca sxs with dry mouth with dental carries, dry eyes & dry mouth     Response to pilocarpine      But cough & mild SOB following--patient opts to continue    Bilateral parotid gland swelling    +MANISHA 1:2560; +SSA; +SSB: TP 10.1;     RF neg; LAC, aCLA neg (premature birth at 26.5 wks).     C3, C4, UA ok;  "elevated IgG 4489 (1600); IgA 495 (350);     + FH SLE (sister)    Anemia:  NCNC--chronic; probably mostly chronic disease (according to ) with mild iron deficiency   Hg electrophoresis A2   Lowest Ht 18.1; highest 34.6   Mom & Dad had sickle cell trait   Some component of iron deficiency    retic ok; MMA ok;    G6PD ok; hapto not low    Non restorative sleep associated with fatigue & features of FMS    Bilateral knee pain/weakness--stable   Imaging with minimal bilateral OA    Recurrent ENT & possibly pulmonary infections associated with IgG2 deficiency   S/P bilateral otomastoiditis with fever hospitalized 11/15    Possibly parotidomegaly played a role.   Pulmonary issues:     S/P CAP 3/17/16:     Also with chronic cough & SOB     She's had "allergic cough" in past.     Salagen as a culprit ruled out     CT chest 5/2/16: unremarkable   Inadequate response to pneumovax   IgG2 deficiency on Hizentra     Reactive Airways/Asthma    Some response to advair    Obesity      Plan:   Stay on  mg/d with eye exams.  Ok to continue pilocarpine 7.5 mg qid.  Continue OTC for dry mouth and eyes prn  Continue aerobic exercise  Counseled on weight loss--yet again. Eat breakfast.  At next visit may get labs.  RTC 6 months or prn.        CC: Damon Shields MD  "

## 2018-08-13 ENCOUNTER — OFFICE VISIT (OUTPATIENT)
Dept: RHEUMATOLOGY | Facility: CLINIC | Age: 43
End: 2018-08-13
Payer: COMMERCIAL

## 2018-08-13 VITALS
HEART RATE: 74 BPM | DIASTOLIC BLOOD PRESSURE: 69 MMHG | SYSTOLIC BLOOD PRESSURE: 100 MMHG | BODY MASS INDEX: 35.17 KG/M2 | WEIGHT: 206 LBS | HEIGHT: 64 IN

## 2018-08-13 DIAGNOSIS — M35.09 SJOGREN'S SYNDROME WITH OTHER ORGAN INVOLVEMENT: Primary | ICD-10-CM

## 2018-08-13 DIAGNOSIS — Z79.899 LONG-TERM USE OF HYDROXYCHLOROQUINE: ICD-10-CM

## 2018-08-13 DIAGNOSIS — D57.3 SICKLE CELL TRAIT: ICD-10-CM

## 2018-08-13 DIAGNOSIS — E66.9 OBESITY (BMI 30-39.9): ICD-10-CM

## 2018-08-13 PROCEDURE — 3008F BODY MASS INDEX DOCD: CPT | Mod: CPTII,S$GLB,, | Performed by: INTERNAL MEDICINE

## 2018-08-13 PROCEDURE — 99214 OFFICE O/P EST MOD 30 MIN: CPT | Mod: S$GLB,,, | Performed by: INTERNAL MEDICINE

## 2018-08-13 PROCEDURE — 99999 PR PBB SHADOW E&M-EST. PATIENT-LVL IV: CPT | Mod: PBBFAC,,, | Performed by: INTERNAL MEDICINE

## 2018-08-13 NOTE — PATIENT INSTRUCTIONS
Keep up daily exercise.    Eat before you exercise! But leave some time to digest.    Do not eat anything late at night.  Just drink water.

## 2018-08-15 ENCOUNTER — TELEPHONE (OUTPATIENT)
Dept: ALLERGY | Facility: CLINIC | Age: 43
End: 2018-08-15

## 2018-08-15 NOTE — TELEPHONE ENCOUNTER
Received a fax from [employer].  Hizentra has been approved.    Hizentra 10 grams/50 ml vial   Authorization coverage is for 53.000NDC units and effective starting 8/8/2018-8/7/2019    PA number [id number]..    Form placed in file cabinet.

## 2018-08-30 DIAGNOSIS — K11.1 PAROTID GLAND ENLARGEMENT: ICD-10-CM

## 2018-08-30 RX ORDER — HYDROXYCHLOROQUINE SULFATE 200 MG/1
400 TABLET, FILM COATED ORAL DAILY
Qty: 120 TABLET | Refills: 1 | Status: SHIPPED | OUTPATIENT
Start: 2018-08-30 | End: 2018-10-27 | Stop reason: SDUPTHER

## 2018-09-04 ENCOUNTER — OFFICE VISIT (OUTPATIENT)
Dept: FAMILY MEDICINE | Facility: CLINIC | Age: 43
End: 2018-09-04
Payer: COMMERCIAL

## 2018-09-04 VITALS
HEIGHT: 64 IN | SYSTOLIC BLOOD PRESSURE: 118 MMHG | HEART RATE: 86 BPM | WEIGHT: 206.44 LBS | BODY MASS INDEX: 35.24 KG/M2 | TEMPERATURE: 99 F | DIASTOLIC BLOOD PRESSURE: 76 MMHG | OXYGEN SATURATION: 95 %

## 2018-09-04 DIAGNOSIS — J01.01 ACUTE RECURRENT MAXILLARY SINUSITIS: ICD-10-CM

## 2018-09-04 DIAGNOSIS — J45.40 BRONCHITIS, ASTHMATIC, MODERATE PERSISTENT, UNCOMPLICATED: Primary | ICD-10-CM

## 2018-09-04 PROCEDURE — 3008F BODY MASS INDEX DOCD: CPT | Mod: CPTII,S$GLB,, | Performed by: NURSE PRACTITIONER

## 2018-09-04 PROCEDURE — 99214 OFFICE O/P EST MOD 30 MIN: CPT | Mod: S$GLB,,, | Performed by: NURSE PRACTITIONER

## 2018-09-04 PROCEDURE — 99999 PR PBB SHADOW E&M-EST. PATIENT-LVL IV: CPT | Mod: PBBFAC,,, | Performed by: NURSE PRACTITIONER

## 2018-09-04 RX ORDER — PROMETHAZINE HYDROCHLORIDE AND DEXTROMETHORPHAN HYDROBROMIDE 6.25; 15 MG/5ML; MG/5ML
5 SYRUP ORAL EVERY 12 HOURS PRN
Qty: 180 ML | Refills: 0 | Status: SHIPPED | OUTPATIENT
Start: 2018-09-04 | End: 2018-09-14

## 2018-09-04 RX ORDER — METHYLPREDNISOLONE 4 MG/1
TABLET ORAL
Qty: 1 PACKAGE | Refills: 0 | Status: SHIPPED | OUTPATIENT
Start: 2018-09-04 | End: 2018-11-07 | Stop reason: ALTCHOICE

## 2018-09-04 RX ORDER — AMOXICILLIN AND CLAVULANATE POTASSIUM 875; 125 MG/1; MG/1
1 TABLET, FILM COATED ORAL 2 TIMES DAILY
Qty: 20 TABLET | Refills: 0 | Status: SHIPPED | OUTPATIENT
Start: 2018-09-04 | End: 2018-09-14

## 2018-09-04 NOTE — PROGRESS NOTES
Subjective:       Patient ID: Manasa Hollins is a 42 y.o. female.    Chief Complaint: Shortness of Breath (X Friday ) and Cough (X Friday )    Shortness of Breath   This is a new problem. The current episode started 1 to 4 weeks ago. The problem occurs constantly. The problem has been gradually worsening. Associated symptoms include a fever, rhinorrhea, a sore throat, sputum production and wheezing. Pertinent negatives include no ear pain or headaches. Nothing aggravates the symptoms. She has tried beta agonist inhalers for the symptoms. The treatment provided no relief. Her past medical history is significant for asthma and pneumonia.   Cough   Associated symptoms include a fever, postnasal drip, rhinorrhea, a sore throat, shortness of breath and wheezing. Pertinent negatives include no chills, ear pain or headaches. Her past medical history is significant for asthma and pneumonia.       Past Medical History:   Diagnosis Date    Asthma     General anesthetics causing adverse effect in therapeutic use     GERD (gastroesophageal reflux disease)     Iron deficiency anemia     Sjogren's disease        Social History     Socioeconomic History    Marital status:      Spouse name: Not on file    Number of children: Not on file    Years of education: Not on file    Highest education level: Not on file   Social Needs    Financial resource strain: Not on file    Food insecurity - worry: Not on file    Food insecurity - inability: Not on file    Transportation needs - medical: Not on file    Transportation needs - non-medical: Not on file   Occupational History    Not on file   Tobacco Use    Smoking status: Never Smoker    Smokeless tobacco: Never Used   Substance and Sexual Activity    Alcohol use: No     Alcohol/week: 0.0 oz    Drug use: No    Sexual activity: No   Other Topics Concern    Not on file   Social History Narrative    Not on file       Past Surgical History:   Procedure  "Laterality Date     SECTION      x4    MYOMECTOMY      TUBAL LIGATION      done during myometomy surgery.    upper gi  2016       Review of Systems   Constitutional: Positive for fever. Negative for chills.   HENT: Positive for congestion, postnasal drip, rhinorrhea, sinus pressure and sore throat. Negative for ear pain, sneezing and trouble swallowing.    Respiratory: Positive for cough, sputum production, shortness of breath and wheezing.    Neurological: Negative for headaches.   All other systems reviewed and are negative.      Objective:   /76 (BP Location: Left arm, Patient Position: Sitting, BP Method: Medium (Manual))   Pulse 86   Temp 98.8 °F (37.1 °C) (Oral)   Ht 5' 4" (1.626 m)   Wt 93.6 kg (206 lb 7.4 oz)   LMP 2018   SpO2 95%   BMI 35.44 kg/m²      Physical Exam   Constitutional: She is oriented to person, place, and time. She appears well-developed and well-nourished. She is cooperative.   HENT:   Head: Normocephalic and atraumatic.   Right Ear: Hearing, tympanic membrane, external ear and ear canal normal.   Left Ear: Hearing, tympanic membrane, external ear and ear canal normal.   Nose: No rhinorrhea. Right sinus exhibits maxillary sinus tenderness. Left sinus exhibits maxillary sinus tenderness.   Mouth/Throat: Posterior oropharyngeal erythema present. No oropharyngeal exudate or posterior oropharyngeal edema.   Cardiovascular: Normal rate and regular rhythm.   Pulmonary/Chest: Effort normal and breath sounds normal. No respiratory distress. She has no decreased breath sounds. She has no wheezes. She has no rhonchi. She has no rales.   Lymphadenopathy:     She has no cervical adenopathy.   Neurological: She is alert and oriented to person, place, and time.   Skin: Skin is warm, dry and intact. She is not diaphoretic. No pallor.   Psychiatric: She has a normal mood and affect. Her speech is normal and behavior is normal.   Vitals reviewed.      Assessment:     "   1. Bronchitis, asthmatic, moderate persistent, uncomplicated    2. Acute recurrent maxillary sinusitis        Plan:       Manasa was seen today for shortness of breath and cough.    Diagnoses and all orders for this visit:    Bronchitis, asthmatic, moderate persistent, uncomplicated    Acute recurrent maxillary sinusitis  -     amoxicillin-clavulanate 875-125mg (AUGMENTIN) 875-125 mg per tablet; Take 1 tablet by mouth 2 (two) times daily. for 10 days  -     promethazine-dextromethorphan (PROMETHAZINE-DM) 6.25-15 mg/5 mL Syrp; Take 5 mLs by mouth every 12 (twelve) hours as needed.  -     methylPREDNISolone (MEDROL DOSEPACK) 4 mg tablet; use as directed  -     Increase your water intake to 64-80 oz daily to help thin mucus  -     Nasal Saline spray (Over the counter [place] spray or [place])  2 sprays each nostril 2-3 times a day for nasal congestion  -     Tylenol 500 mg 2 tablets or Ibuprofen 200 mg 2 tablets every 4-6 hours as needed for fever, headaches, sore throat, ear pain, bodyaches, and/or nasal/sinus inflammation  -     Warm salt water gargles with 1/2 cup water and 1 tablespoon salt every 4 hours  -     Warm tea with honey and lemon    Follow-up if symptoms worsen or fail to improve.

## 2018-09-04 NOTE — PATIENT INSTRUCTIONS
Acute Bronchitis  Your healthcare provider has told you that you have acute bronchitis. Bronchitis is infection or inflammation of the bronchial tubes (airways in the lungs). Normally, air moves easily in and out of the airways. Bronchitis narrows the airways, making it harder for air to flow in and out of the lungs. This causes symptoms such as shortness of breath, coughing up yellow or green mucus, and wheezing. Bronchitis can be acute or chronic. Acute means the condition comes on quickly and goes away in a short time, usually within 3 to 10 days. Chronic means a condition lasts a long time and often comes back.    What causes acute bronchitis?  Acute bronchitis almost always starts as a viral respiratory infection, such as a cold or the flu. Certain factors make it more likely for a cold or flu to turn into bronchitis. These include being very young, being elderly, having a heart or lung problem, or having a weak immune system. Cigarette smoking also makes bronchitis more likely.  When bronchitis develops, the airways become swollen. The airways may also become infected with bacteria. This is known as a secondary infection.  Diagnosing acute bronchitis  Your healthcare provider will examine you and ask about your symptoms and health history. You may also have a sputum culture to test the fluid in your lungs. Chest X-rays may be done to look for infection in the lungs.  Treating acute bronchitis  Bronchitis usually clears up as the cold or flu goes away. You can help feel better faster by doing the following:  · Take medicine as directed. You may be told to take ibuprofen or other over-the-counter medicines. These help relieve inflammation in your bronchial tubes. Your healthcare provider may prescribe an inhaler to help open up the bronchial tubes. Most of the time, acute bronchitis is caused by a viral infection. Antibiotics are usually not prescribed for viral infections.  · Drink plenty of fluids, such as  water, juice, or warm soup. Fluids loosen mucus so that you can cough it up. This helps you breathe more easily. Fluids also prevent dehydration.  · Make sure you get plenty of rest.  · Do not smoke. Do not allow anyone else to smoke in your home.  Recovery and follow-up  Follow up with your doctor as you are told. You will likely feel better in a week or two. But a dry cough can linger beyond that time. Let your doctor know if you still have symptoms (other than a dry cough) after 2 weeks, or if youre prone to getting bronchial infections. Take steps to protect yourself from future infections. These steps include stopping smoking and avoiding tobacco smoke, washing your hands often, and getting a yearly flu shot.  When to call your healthcare provider  Call the healthcare provider if you have any of the following:  · Fever of 100.4°F (38.0°C) or higher, or as advised  · Symptoms that get worse, or new symptoms  · Trouble breathing  · Symptoms that dont start to improve within a week, or within 3 days of taking antibiotics   Date Last Reviewed: 12/1/2016  © 9264-2989 [employer]. [address] [place] [address], [place] [address]. All rights reserved. This information is not intended as a substitute for professional medical care. Always follow your healthcare professional's instructions.

## 2018-10-26 ENCOUNTER — OFFICE VISIT (OUTPATIENT)
Dept: FAMILY MEDICINE | Facility: CLINIC | Age: 43
End: 2018-10-26
Payer: COMMERCIAL

## 2018-10-26 VITALS
DIASTOLIC BLOOD PRESSURE: 82 MMHG | HEART RATE: 96 BPM | WEIGHT: 209.31 LBS | HEIGHT: 64 IN | TEMPERATURE: 99 F | BODY MASS INDEX: 35.73 KG/M2 | SYSTOLIC BLOOD PRESSURE: 132 MMHG | OXYGEN SATURATION: 97 %

## 2018-10-26 DIAGNOSIS — J02.9 PHARYNGITIS, UNSPECIFIED ETIOLOGY: ICD-10-CM

## 2018-10-26 DIAGNOSIS — H65.03 BILATERAL ACUTE SEROUS OTITIS MEDIA, RECURRENCE NOT SPECIFIED: Primary | ICD-10-CM

## 2018-10-26 DIAGNOSIS — Z23 NEED FOR INFLUENZA VACCINATION: ICD-10-CM

## 2018-10-26 DIAGNOSIS — R05.9 COUGH: ICD-10-CM

## 2018-10-26 PROCEDURE — 99999 PR PBB SHADOW E&M-EST. PATIENT-LVL V: CPT | Mod: PBBFAC,,, | Performed by: NURSE PRACTITIONER

## 2018-10-26 PROCEDURE — 90471 IMMUNIZATION ADMIN: CPT | Mod: S$GLB,,, | Performed by: NURSE PRACTITIONER

## 2018-10-26 PROCEDURE — 99214 OFFICE O/P EST MOD 30 MIN: CPT | Mod: 25,S$GLB,, | Performed by: NURSE PRACTITIONER

## 2018-10-26 PROCEDURE — 90686 IIV4 VACC NO PRSV 0.5 ML IM: CPT | Mod: S$GLB,,, | Performed by: NURSE PRACTITIONER

## 2018-10-26 PROCEDURE — 3008F BODY MASS INDEX DOCD: CPT | Mod: CPTII,S$GLB,, | Performed by: NURSE PRACTITIONER

## 2018-10-26 RX ORDER — PROMETHAZINE HYDROCHLORIDE AND DEXTROMETHORPHAN HYDROBROMIDE 6.25; 15 MG/5ML; MG/5ML
5 SYRUP ORAL EVERY 12 HOURS PRN
Qty: 180 ML | Refills: 0 | Status: SHIPPED | OUTPATIENT
Start: 2018-10-26 | End: 2018-11-05

## 2018-10-26 RX ORDER — AMOXICILLIN 500 MG/1
500 TABLET, FILM COATED ORAL 2 TIMES DAILY
Qty: 14 TABLET | Refills: 0 | Status: SHIPPED | OUTPATIENT
Start: 2018-10-26 | End: 2018-11-02

## 2018-10-26 NOTE — PATIENT INSTRUCTIONS
Middle Ear Infection (Adult)  You have an infection of the middle ear, the space behind the eardrum. This is also called acute otitis media (AOM). Sometimes it is caused by the common cold. This is because congestion can block the internal passage (eustachian tube) that drains fluid from the middle ear. When the middle ear fills with fluid, bacteria can grow there and cause an infection. Oral antibiotics are used to treat this illness, not ear drops. Symptoms usually start to improve within 1 to 2 days of treatment.    Home care  The following are general care guidelines:  · Finish all of the antibiotic medicine given, even though you may feel better after the first few days.  · You may use over-the-counter medicine, such as acetaminophen or ibuprofen, to control pain and fever, unless something else was prescribed. If you have chronic liver or kidney disease or have ever had a stomach ulcer or gastrointestinal bleeding, talk with your healthcare provider before using these medicines. Do not give aspirin to anyone under 18 years of age who has a fever. It may cause severe illness or death.  Follow-up care  Follow up with your healthcare provider, or as advised, in 2 weeks if all symptoms have not gotten better, or if hearing doesn't go back to normal within 1 month.  When to seek medical advice  Call your healthcare provider right away if any of these occur:  · Ear pain gets worse or does not improve after 3 days of treatment  · Unusual drowsiness or confusion  · Neck pain, stiff neck, or headache  · Fluid or blood draining from the ear canal  · Fever of 100.4°F (38°C) or as advised   · Seizure  Date Last Reviewed: 6/1/2016  © 9024-1430 [employer]. [address] [place] [address], [place] [address]. All rights reserved. This information is not intended as a substitute for professional medical care. Always follow your healthcare professional's instructions.

## 2018-10-26 NOTE — PROGRESS NOTES
Subjective:       Patient ID: Manasa Hollins is a 42 y.o. female.    Chief Complaint: Asthma (pt states SOB and sweating )    42 years old female c/o SOB this morning relieved by albuterol inhaler. She c/o productive cough x 2 day. Patient did not take anything for cough.  Denies fever, headache,  N&V.   Patient describes a sharp pain in chest 4/10 when inhaling and is relieved when exhale.      Asthma   She complains of chest tightness, cough, shortness of breath and wheezing. The current episode started today. Asthma causes daytime symptoms never. Asthma causes nighttime symptoms never. The problem has been unchanged. The cough is productive of sputum. Associated symptoms include rhinorrhea, sneezing, sweats and trouble swallowing. Pertinent negatives include no chest pain or headaches. Her symptoms are aggravated by nothing. Her symptoms are alleviated by steroid inhaler. She reports minimal improvement on treatment. Her past medical history is significant for asthma.       Past Medical History:   Diagnosis Date    Asthma     General anesthetics causing adverse effect in therapeutic use     GERD (gastroesophageal reflux disease)     Iron deficiency anemia     Sjogren's disease        Social History     Socioeconomic History    Marital status:      Spouse name: Not on file    Number of children: Not on file    Years of education: Not on file    Highest education level: Not on file   Social Needs    Financial resource strain: Not on file    Food insecurity - worry: Not on file    Food insecurity - inability: Not on file    Transportation needs - medical: Not on file    Transportation needs - non-medical: Not on file   Occupational History    Not on file   Tobacco Use    Smoking status: Never Smoker    Smokeless tobacco: Never Used   Substance and Sexual Activity    Alcohol use: No     Alcohol/week: 0.0 oz    Drug use: No    Sexual activity: No   Other Topics Concern    Not on  "file   Social History Narrative    Not on file       Past Surgical History:   Procedure Laterality Date    CAUTERIZATION OF TURBINATES Bilateral 2014    [facility]     SECTION      x4    ESOPHAGOGASTRODUODENOSCOPY (EGD) N/A 2016    [facility] ENDO (4TH FLR)    MYOMECTOMY      RECONSTRUCTION-NASAL N/A 2014    [facility]    RESECTION-TURBINATES (SMR) Bilateral 2017    [facility] OR 2ND FLR    SINUS SURGERY FUNCTIONAL ENDOSCOPIC WITH NAVIGATION Bilateral 2017    [facility] 2ND FLR    SINUS SURGERY FUNCTIONAL ENDOSCOPIC WITH NAVIGATION N/A 2014    [facility]    TUBAL LIGATION      done during myometomy surgery.    upper gi  2016       Review of Systems   Constitutional: Negative for activity change and unexpected weight change.   HENT: Positive for congestion, rhinorrhea, sneezing and trouble swallowing. Negative for hearing loss.    Eyes: Negative for discharge.   Respiratory: Positive for cough, shortness of breath and wheezing. Negative for chest tightness.    Cardiovascular: Negative for chest pain and palpitations.   Gastrointestinal: Negative for blood in stool, constipation, diarrhea and vomiting.   Endocrine: Negative for polydipsia and polyuria.   Genitourinary: Negative for difficulty urinating, dysuria, hematuria and menstrual problem.   Musculoskeletal: Negative for joint swelling and neck pain.   Neurological: Negative for weakness and headaches.   Psychiatric/Behavioral: Negative for confusion and dysphoric mood.       Objective:   /82 (BP Location: Right arm, Patient Position: Sitting, BP Method: Medium (Manual))   Pulse 96   Temp 98.5 °F (36.9 °C) (Oral)   Ht 5' 4" (1.626 m)   Wt 95 kg (209 lb 5.2 oz)   LMP 10/06/2018   SpO2 97%   BMI 35.93 " kg/m²      Physical Exam   Constitutional: She is oriented to person, place, and time.   HENT:   Right Ear: Tympanic membrane is erythematous and bulging.   Left Ear: Tympanic membrane is erythematous and bulging.   Mouth/Throat: Posterior oropharyngeal erythema present.   Cardiovascular: Normal rate and regular rhythm.   Pulmonary/Chest: Breath sounds normal.   Abdominal: Normal appearance and bowel sounds are normal.   Neurological: She is alert and oriented to person, place, and time.   Skin: Skin is warm.   Psychiatric: She has a normal mood and affect. Her speech is normal and behavior is normal. Judgment and thought content normal. Cognition and memory are normal.       Assessment:       1. Bilateral acute serous otitis media, recurrence not specified    2. Cough    3. Pharyngitis, unspecified etiology    4. Need for influenza vaccination        Plan:       Manasa was seen today for asthma.    Diagnoses and all orders for this visit:    Bilateral acute serous otitis media, recurrence not specified  -     amoxicillin (AMOXIL) 500 MG Tab; Take 1 tablet (500 mg total) by mouth 2 (two) times daily. for 7 days    Cough  -     promethazine-dextromethorphan (PROMETHAZINE-DM) 6.25-15 mg/5 mL Syrp; Take 5 mLs by mouth every 12 (twelve) hours as needed.    Pharyngitis, unspecified etiology  -     Tylenol 500 mg 2 tablets or Ibuprofen 200 mg 2 tablets every 4-6 hours as needed for fever, headaches, sore throat, ear pain, bodyaches, and/or nasal/sinus inflammation  -     Warm salt water gargles with 1/2 cup water and 1 tablespoon salt every 4 hours  -     Warm tea with honey and lemon    Need for influenza vaccination  -     Influenza - Quadrivalent (3 years & older) (PF)      Follow-up if symptoms worsen or fail to improve.

## 2018-10-27 DIAGNOSIS — K11.1 PAROTID GLAND ENLARGEMENT: ICD-10-CM

## 2018-10-27 RX ORDER — HYDROXYCHLOROQUINE SULFATE 200 MG/1
400 TABLET, FILM COATED ORAL DAILY
Qty: 120 TABLET | Refills: 0 | Status: SHIPPED | OUTPATIENT
Start: 2018-10-27 | End: 2018-12-04 | Stop reason: SDUPTHER

## 2018-11-07 ENCOUNTER — OFFICE VISIT (OUTPATIENT)
Dept: FAMILY MEDICINE | Facility: CLINIC | Age: 43
End: 2018-11-07
Payer: COMMERCIAL

## 2018-11-07 VITALS
WEIGHT: 207.13 LBS | SYSTOLIC BLOOD PRESSURE: 114 MMHG | BODY MASS INDEX: 35.36 KG/M2 | DIASTOLIC BLOOD PRESSURE: 68 MMHG | OXYGEN SATURATION: 98 % | TEMPERATURE: 98 F | HEIGHT: 64 IN | HEART RATE: 83 BPM

## 2018-11-07 DIAGNOSIS — M62.838 TRAPEZIUS MUSCLE SPASM: ICD-10-CM

## 2018-11-07 DIAGNOSIS — M54.2 NECK PAIN: Primary | ICD-10-CM

## 2018-11-07 PROCEDURE — 99214 OFFICE O/P EST MOD 30 MIN: CPT | Mod: S$GLB,,, | Performed by: NURSE PRACTITIONER

## 2018-11-07 PROCEDURE — 99999 PR PBB SHADOW E&M-EST. PATIENT-LVL V: CPT | Mod: PBBFAC,,, | Performed by: NURSE PRACTITIONER

## 2018-11-07 PROCEDURE — 3008F BODY MASS INDEX DOCD: CPT | Mod: CPTII,S$GLB,, | Performed by: NURSE PRACTITIONER

## 2018-11-07 RX ORDER — TIZANIDINE 4 MG/1
4 TABLET ORAL EVERY 6 HOURS PRN
Qty: 40 TABLET | Refills: 0 | Status: SHIPPED | OUTPATIENT
Start: 2018-11-07 | End: 2018-11-17

## 2018-11-07 NOTE — PROGRESS NOTES
Subjective:       Patient ID: Manasa Hollins is a 42 y.o. female.    Chief Complaint: Neck Pain and Shoulder Pain    42-year-old female presents to the clinic today with complaint of neck pain radiating to right shoulder that started this morning when she woke up.  She denies any known trauma.  She states her throat also felt swollen this morning when she woke up.  She said she had trouble swallowing her pills.  She denies any pain, numbness, tingling, or weakness to upper extremities.  She denies any further ear pain or sinus congestion. She denies any coughing, shortness of breath, or wheezing.  She was seen on 10/26/2018 was treated for bilateral otitis might media with amoxicillin, Phenergan DM, Tylenol, and ibuprofen.  She was seen on 2018 and treated for bronchitis with Augmentin, Phenergan DM and a Medrol Dosepak.      Past Medical History:   Diagnosis Date    Asthma     General anesthetics causing adverse effect in therapeutic use     GERD (gastroesophageal reflux disease)     Iron deficiency anemia     Sjogren's disease      Past Surgical History:   Procedure Laterality Date    CAUTERIZATION OF TURBINATES Bilateral 2014    [facility]     SECTION      x4    ESOPHAGOGASTRODUODENOSCOPY (EGD) N/A 2016    [facility]formed [facility] ENDO (4TH FLR)    MYOMECTOMY      RECONSTRUCTION-NASAL N/A 2014    [facility] at [facility] OR    RESECTION-TURBINATES (SMR) Bilateral 2017    [facility]formed [facility] at [facility] OR 2ND FLR    SINUS SURGERY FUNCTIONAL ENDOSCOPIC WITH NAVIGATION Bilateral 2017    Performed [facility] OR 2ND FLR    SINUS SURGERY FUNCTIONAL ENDOSCOPIC WITH NAVIGATION N/A 2014    [facility] at [facility] OR    TUBAL LIGATION      done during myometomy surgery.    upper gi  2016      reports that  has never smoked. she has  never used smokeless tobacco. She reports that she does not drink alcohol or use drugs.  Review of Systems   Constitutional: Negative for activity change and unexpected weight change.   HENT: Positive for trouble swallowing. Negative for hearing loss and rhinorrhea.    Eyes: Negative for discharge and visual disturbance.   Respiratory: Negative for chest tightness and wheezing.    Cardiovascular: Negative for chest pain and palpitations.   Gastrointestinal: Positive for constipation. Negative for blood in stool, diarrhea and vomiting.   Endocrine: Negative for polydipsia and polyuria.   Genitourinary: Negative for difficulty urinating, dysuria, hematuria and menstrual problem.   Musculoskeletal: Positive for neck pain. Negative for arthralgias and joint swelling.   Neurological: Positive for headaches. Negative for weakness.   Psychiatric/Behavioral: Negative for confusion and dysphoric mood.       Objective:      Physical Exam   Constitutional: She is oriented to person, place, and time. She appears well-developed and well-nourished. No distress.   HENT:   Head: Normocephalic and atraumatic.   Right Ear: External ear normal.   Left Ear: External ear normal.   Nose: Nose normal.   Mouth/Throat: Oropharynx is clear and moist. No oropharyngeal exudate.   Throat no evidence of any swelling of uvula, tongue, tonsils, lips noted    Eyes: Conjunctivae and EOM are normal. Pupils are equal, round, and reactive to light. Right eye exhibits no discharge. Left eye exhibits no discharge. No scleral icterus.   Neck: Normal range of motion. Neck supple.   C-spine non-tender to palpation tenderness and tightness over right trapezius muscle    Cardiovascular: Normal rate, regular rhythm and normal heart sounds. Exam reveals no gallop and no friction rub.   No murmur heard.  Pulmonary/Chest: Effort normal and breath sounds normal. No respiratory distress. She has no wheezes.   Abdominal: Soft. Bowel sounds are normal. There is no  tenderness.   Musculoskeletal: Normal range of motion. She exhibits no edema.   Lymphadenopathy:     She has no cervical adenopathy.   Neurological: She is alert and oriented to person, place, and time.   Skin: Skin is warm and dry. She is not diaphoretic.   Psychiatric: She has a normal mood and affect.       Assessment:       1. Neck pain    2. Trapezius muscle spasm        Plan:         Neck pain  - continue Ibuprofen as needed    Trapezius muscle spasm  -     tiZANidine (ZANAFLEX) 4 MG tablet; Take 1 tablet (4 mg total) by mouth every 6 (six) hours as needed.  Dispense: 40 tablet; Refill: 0    Patient drank a cup of water without any difficulty in the clinic. No choking or vomiting noted. If difficulty with swallowing reoccurs go the ER for further evaluation

## 2018-11-07 NOTE — PATIENT INSTRUCTIONS
Zanaflex 4 mg every 6 hours as needed for muscle spasms  Motrin 600 mg every 8 hours with food as needed for neck pain   Drink water and take medications slowly

## 2018-12-04 DIAGNOSIS — K11.1 PAROTID GLAND ENLARGEMENT: ICD-10-CM

## 2018-12-04 RX ORDER — HYDROXYCHLOROQUINE SULFATE 200 MG/1
400 TABLET, FILM COATED ORAL DAILY
Qty: 120 TABLET | Refills: 0 | Status: SHIPPED | OUTPATIENT
Start: 2018-12-04 | End: 2018-12-19 | Stop reason: SDUPTHER

## 2018-12-05 ENCOUNTER — OFFICE VISIT (OUTPATIENT)
Dept: ALLERGY | Facility: CLINIC | Age: 43
End: 2018-12-05
Payer: COMMERCIAL

## 2018-12-05 VITALS
DIASTOLIC BLOOD PRESSURE: 66 MMHG | SYSTOLIC BLOOD PRESSURE: 108 MMHG | WEIGHT: 207.69 LBS | HEIGHT: 64 IN | BODY MASS INDEX: 35.46 KG/M2

## 2018-12-05 DIAGNOSIS — D80.6 ANTI-POLYSACCHARIDE ANTIBODY DEFICIENCY: Primary | ICD-10-CM

## 2018-12-05 DIAGNOSIS — J32.9 RECURRENT SINUSITIS: ICD-10-CM

## 2018-12-05 DIAGNOSIS — J31.0 CHRONIC RHINITIS: ICD-10-CM

## 2018-12-05 DIAGNOSIS — K21.9 GASTROESOPHAGEAL REFLUX DISEASE, ESOPHAGITIS PRESENCE NOT SPECIFIED: ICD-10-CM

## 2018-12-05 PROCEDURE — 3008F BODY MASS INDEX DOCD: CPT | Mod: CPTII,S$GLB,, | Performed by: ALLERGY & IMMUNOLOGY

## 2018-12-05 PROCEDURE — 99999 PR PBB SHADOW E&M-EST. PATIENT-LVL III: CPT | Mod: PBBFAC,,, | Performed by: ALLERGY & IMMUNOLOGY

## 2018-12-05 PROCEDURE — 99214 OFFICE O/P EST MOD 30 MIN: CPT | Mod: S$GLB,,, | Performed by: ALLERGY & IMMUNOLOGY

## 2018-12-05 RX ORDER — TRIAMCINOLONE ACETONIDE 1 MG/G
OINTMENT TOPICAL 2 TIMES DAILY
Qty: 80 G | Refills: 3 | Status: SHIPPED | OUTPATIENT
Start: 2018-12-05 | End: 2021-02-03 | Stop reason: SDUPTHER

## 2018-12-05 NOTE — PROGRESS NOTES
Subjective:       Patient ID: Manasa Hollins is a 43 y.o. female.    LV 6/1/17    Chief Complaint:  Follow-up  fu specific antibody deficiency    HPI:     Continues Hizentra 10 g weekly (425 mg/kg/mo).   Since LV has had 2-3 episodes URI, sinusitis. 2 courses abx. Tolerating infusions well.        Past Medical History:   Diagnosis Date    Asthma     General anesthetics causing adverse effect in therapeutic use     GERD (gastroesophageal reflux disease)     Iron deficiency anemia     Sjogren's disease    GERD  anemia      Family History   Problem Relation Age of Onset    Cancer Mother         Lung/Cervical    Cancer Maternal Grandmother         Breast/Cervical    Cancer Other         Breast     Asthma Child     Emphysema Neg Hx     Colon cancer Neg Hx     Stomach cancer Neg Hx     Esophageal cancer Neg Hx     Ulcerative colitis Neg Hx     Crohn's disease Neg Hx     Irritable bowel syndrome Neg Hx     Celiac disease Neg Hx           Environmental History: Pets in the home: dogs (1).  [place]: hardwood floors  Tobacco Smoke in Home: yes    smokes outside    Review of Systems   Constitutional: Negative for appetite change, chills, fever and unexpected weight change.   HENT: Negative for ear pain, facial swelling, postnasal drip and sinus pressure.    Eyes: Negative for photophobia, discharge and visual disturbance.   Respiratory: Negative for cough, chest tightness, shortness of breath and wheezing.    Cardiovascular: Negative for chest pain and palpitations.   Gastrointestinal: Negative for abdominal distention.   Musculoskeletal: Negative for arthralgias and joint swelling.   Neurological: Negative for dizziness and headaches.   Hematological: Negative for adenopathy. Does not bruise/bleed easily.   Psychiatric/Behavioral: Negative for behavioral problems and sleep disturbance.        Objective:    Physical Exam   Constitutional: She is oriented to person, place, and time. She appears  well-developed and well-nourished. No distress.   HENT:   Head: Normocephalic.   Right Ear: Hearing, tympanic membrane and ear canal normal.   Left Ear: Hearing, tympanic membrane and ear canal normal.   Nose: No mucosal edema, rhinorrhea, sinus tenderness or septal deviation. Right sinus exhibits no maxillary sinus tenderness and no frontal sinus tenderness. Left sinus exhibits no maxillary sinus tenderness and no frontal sinus tenderness.   Mouth/Throat: Uvula is midline, oropharynx is clear and moist and mucous membranes are normal. No uvula swelling.   Eyes: Conjunctivae are normal. Right eye exhibits no discharge. Left eye exhibits no discharge.   Neck: Normal range of motion. No thyromegaly present.   Cardiovascular: Normal rate and regular rhythm.   No murmur heard.  Pulmonary/Chest: Effort normal and breath sounds normal. No respiratory distress. She has no wheezes.   Abdominal: Soft. She exhibits no distension. There is no tenderness. There is no guarding.   Musculoskeletal: Normal range of motion. She exhibits no edema or tenderness.   Lymphadenopathy:     She has no cervical adenopathy.   Neurological: She is alert and oriented to person, place, and time.   Skin: Skin is warm. No rash noted. No erythema.   Psychiatric: She has a normal mood and affect. Her behavior is normal.   Nursing note and vitals reviewed.      Laboratory:      immunoCAPs pos to dust mites and cockroach  Results for BRIDGER SHERIDAN (MRN [id number]) as of 5/19/2016 13:24   Ref. Range 4/29/2016 11:30   IgG - Serum Latest Ref Range: 650 - 1600 mg/dL 4489 (H)   IgM Latest Ref Range: 50 - 300 mg/dL 50   IgA Latest Ref Range: 40 - 350 mg/dL 495 (H)   IgE Latest Ref Range: 0 - 100 IU/mL 84     Results for BRIDGER SHERIDAN (MRN [id number]) as of 9/7/2016 09:19   Ref. Range 5/3/2016 10:53   IgG 1 Latest Ref Range: 490 - 1140 mg/dL 3,490 (H)   IgG 2 Latest Ref Range: 150 - 640 mg/dL 56 (L)   IgG 3 Latest Ref Range: 11 - 85 mg/dL 47    IgG 4 Latest Ref Range: 3 - 201 mg/dL 38     Results for BRIDGER SHERIDAN (MRN [id number]) as of 9/7/2016 09:19   Ref. Range 4/29/2016 11:30 7/2/2016 11:12 7/5/2016 09:01 8/24/2016 08:17   S.pneumoniae Type 1 Latest Units: mcg/mL <0.3  0.4 0.7   S.pneumoniae Type 3 Latest Units: mcg/mL <0.3  <0.3 3.5   Strep pneumo Type 4 Latest Units: mcg/mL <0.3  <0.3 <0.3   S.pneumoniae Type 5 Latest Units: mcg/mL <0.3  <0.3 <0.3   S.pneumoniae Type 6B Latest Units: mcg/mL <0.3  <0.3 <0.3   S.pneumoniae Type 7F Latest Units: mcg/mL 2.3  3.0 6.1   S.pneumoniae Type 8 Latest Units: mcg/mL <0.3  <0.3 <0.3   S.pneumoniae Type 9N Latest Units: mcg/mL <0.3  <0.3 0.6   S.pneumoniae Type 9V Abs Latest Units: mcg/mL <0.3  <0.3 0.3   S.pneumoniae Type 12F Latest Units: mcg/mL <0.3  <0.3 <0.3   Strep pneumo Type 14 Latest Units: mcg/mL <0.3  0.5 0.7   S.pneumoniae Type 18C Latest Units: mcg/mL <0.3  0.3 0.3   S.pneumoniae Type 19F Latest Units: mcg/mL <0.3  0.4 0.5   S.pneumoniae Type 23F Latest Units: mcg/mL <0.3  <0.3 <0.3       CT Sinuses 12/17/16    Impression:  Postsurgical changes of prior functional endoscopic sinus surgery. Left medial antrostomy is patent, but the right is now obstructed.  Significant increase in mucosal thickening or fluid opacification of paranasal sinuses as above. Hyperattenuating material within the right frontal sinus can be seen with inspissated secretions or fungal elements.     Question left-sided nasal polyposis.      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 Sjogren's   3. GERD   4. sinusitis         Plan:       1. increase weekly SQ IgG replacement, Hizentra, to 12 g SQ weekly (~510 mg/kg/mo).   2. flonase  3. Sinus rinses  4.  xyzal prn  5. Cont prilosec for GERD.   6. Fu 3-6 mo, sooner prn

## 2018-12-10 ENCOUNTER — OFFICE VISIT (OUTPATIENT)
Dept: FAMILY MEDICINE | Facility: CLINIC | Age: 43
End: 2018-12-10
Payer: COMMERCIAL

## 2018-12-10 VITALS
WEIGHT: 206.44 LBS | TEMPERATURE: 99 F | DIASTOLIC BLOOD PRESSURE: 70 MMHG | OXYGEN SATURATION: 97 % | SYSTOLIC BLOOD PRESSURE: 110 MMHG | HEART RATE: 80 BPM | HEIGHT: 64 IN | BODY MASS INDEX: 35.24 KG/M2

## 2018-12-10 DIAGNOSIS — Z00.00 WELL WOMAN EXAM (NO GYNECOLOGICAL EXAM): Primary | ICD-10-CM

## 2018-12-10 PROCEDURE — 99999 PR PBB SHADOW E&M-EST. PATIENT-LVL V: CPT | Mod: PBBFAC,,, | Performed by: NURSE PRACTITIONER

## 2018-12-10 PROCEDURE — 99396 PREV VISIT EST AGE 40-64: CPT | Mod: S$GLB,,, | Performed by: NURSE PRACTITIONER

## 2018-12-10 NOTE — PATIENT INSTRUCTIONS
Prevention Guidelines, Women Ages 40 to 49  Screening tests and vaccines are an important part of managing your health. Health counseling is essential, too. Below are guidelines for these, for women ages 40 to 49. Talk with your healthcare provider to make sure youre up-to-date on what you need.  Screening Who needs it How often   Type 2 diabetes or prediabetes All adults beginning at age 45 and adults without symptoms at any age who are overweight or obese and have 1 or more additional risk factors for diabetes At least every 3 years   Alcohol misuse All women in this age group At routine exams   Blood pressure All women in this age group Every 2 years if your blood pressure is less than 120/80 mm Hg; yearly if your systolic blood pressure is 120 to 139 mm Hg, or your diastolic blood pressure reading is 80 to 89 mm Hg   Breast cancer All women in this age group Yearly mammogram and clinical breast exam2  Each woman should make her own decision. If a woman decides to have mammograms before age 50, they should be done every 2 years.3   Cervical cancer All women in this age group, except women who have had a complete hysterectomy Pap test every 3 years or Pap test plus human papilloma virus (HPV) test every 5 years   Chlamydia Women at increased risk for infection At routine exams if you're at risk or have symptoms   Depression All women in this age group At routine exams   Gonorrhea Sexually active women at increased risk for infection At routine exams   Hepatitis C Anyone at increased risk; 1 time for those born between 1945 and 1965 At routine exams   High cholesterol or triglycerides All women ages 45 and older who are at risk for coronary artery disease; younger women, talk with your healthcare provider At least every 5 years   HIV All women At routine exams   Obesity All women in this age group At routine exams   Syphilis Women at increased risk for infection - talk with your healthcare provider At routine  exams   Tuberculosis Women at increased risk for infection - talk with your healthcare provider Ask your healthcare provider   Vision All women in this age group Complete exam at age 40 and eye exams every 2 to 4 years. If you have a chronic disease, ask your healthcare provider how often you should have your eyes examined.4   Vaccine Who needs it How often   Chickenpox (varicella) All women in this age group who have no record of this infection or vaccine 2 doses; the second dose should be given at least 4 weeks after the first dose   Hepatitis A Women at increased risk for infection - talk with your healthcare provider 2 doses given 6 months apart   Hepatitis B Women at increased risk for infection - talk with your healthcare provider 3 doses over 6 months; second dose should be given 1 month after the first dose; the third dose should be given at least 2 months after the second dose and at least 4 months after the first dose   Haemophilus influenzae Type B (HIB) Women at increased risk 1 to 3 doses   Influenza (flu) All women in this age group Once a year   Measles, mumps, rubella (MMR) All women in this age group who have no record of these infections or vaccines 1 or 2 doses   Meningococcal Women at increased risk for infection - talk with your healthcare provider 1 or more doses   Pneumococcal conjugate vaccine (PCV13) and pneumococcal polysaccharide vaccine (PPSV23) Women at increased risk for infection - talk with your healthcare provider 1 or 2 doses   Tetanus/diphtheria/pertussis (Td/Tdap) booster All women in this age group A one-time dose of Tdap instead of a Td booster after age 18, then Td every 10 years   Counseling Who needs it How often   BRCA gene mutation testing for breast and ovarian cancer susceptibility Women with increased risk for having gene mutation When your risk is known   Breast cancer and chemoprevention Women at high risk for breast cancer When your risk is known   Diet and exercise  Women who are overweight or obese When diagnosed, and then at routine exams   Domestic violence Women at the age in which they are able to have children At routine exams   Sexually transmitted infection prevention Women at increased risk for infection - talk with your healthcare provider At routine exams   Use of tobacco and the health effects it can cause All women in this age group Every exam   1[facility] [employer] Association  2American Cancer Society  3U.S. Preventive Services Task Force  4[facility] [employer] of [employer]  Date Last Reviewed: 8/27/2015  © 2388-3489 [employer]. [address] [place] [address], [place] [id number]. All rights reserved. This information is not intended as a substitute for professional medical care. Always follow your healthcare professional's instructions.

## 2018-12-10 NOTE — PROGRESS NOTES
"Well Woman VISIT      CHIEF COMPLAINT  Chief Complaint   Patient presents with    Annual Exam       HPI  Manasa Hollins is a 43 y.o. female who presents annual exam.     Answers for HPI/ROS submitted by the patient on 12/7/2018   activity change: No  unexpected weight change: No  neck pain: No  hearing loss: No  rhinorrhea: No  trouble swallowing: No  eye discharge: No  visual disturbance: No  chest tightness: No  wheezing: No  chest pain: No  palpitations: No  blood in stool: No  constipation: No  vomiting: No  diarrhea: No  polydipsia: Yes  polyuria: No  difficulty urinating: No  hematuria: No  menstrual problem: No  dysuria: No  joint swelling: No  arthralgias: Yes  headaches: No  weakness: No  confusion: No  dysphoric mood: No      Social Factors  Tobacco use: No  Ready to Quit: NA  Alcohol: No  Intimate partner violence screening  "Do you feel safe in your current relationship?" Yes   "Have you ever been in a relationship in which your partner frightened you or hurt you?" No  Living Will/POA: No  Regular Exercise: No    Depression  Over the past two weeks, have you felt down, depressed, or hopeless? No  Over the past two weeks, have you felt little interest or pleasure in doing things? No    Reproductive Health  Last menstrual period began 11/30/2018 and was normal.   Patient is sexually active.   Contraception: no method, bilateral tubal ligation  On Daily folic acid:  No  STD screening in last year: No  Last PAP: 10/11/2017  HIV screening: No    CHD, HTN, DM2  CHD Risk Factors: obesity (BMI >= 30 kg/m2) and sedentary lifestyle  Women 45 years and older should be screened for dyslipidemia if at increased risk of CHD  Women 20 to 45 years of age should be screened for dyslipidemia if at increased risk of CHD  Asymptomatic adults with sustained blood pressure greater than 135/80 mm Hg (treated or untreated) should be screened for type 2 diabetes mellitus    Estimated body mass index is 35.44 kg/m² " "as calculated from the following:    Height as of this encounter: 5' 4" (1.626 m).    Weight as of this encounter: 93.6 kg (206 lb 7.4 oz).      Screening  Mammogram needed: Ordered  Colonoscopy needed: not clinically indicated per age guidelines  Osteoporosis screen needed: not clinically indicated per age guidelines     Women 50 to 74 years of age should be screened for breast cancer with mammography biennially.  Women should be screened for cervical cancer with Pap tests beginning at 21 years of age. Low-risk women should receive Pap testing every three years. Co-testing for human papillomavirus is an option beginning at 30 years of age, and can extend the screening interval to five years. Cervical cancer screening should be discontinued at 65 years of age or after total hysterectomy if the woman has a benign gynecologic history  Adults 50 to 75 years of age should be screened for colorectal cancer with an FOBT annually, sigmoidoscopy every five years with an FOBT every three years, or colonoscopy every 10 years.  Women 65 years and older should be screened for osteoporosis. Women younger than 65 years should be screened if the risk of fracture is greater than or equal to that of a 65-year-old white woman without additional risk factors.      Immunizations  up to date and documented    ALLERGIES and MEDS were verified.   PMHx, PSHx, FHx, SOCIALHx were updated as pertinent.    REVIEW OF SYSTEMS  Review or Systems  Eyes: denies visual changes at this time denies floaters   ENT: no nasal congestion or sore throat  Respiratory: no cough or shorness of breath  Cardiovascular: no chest pain or palpitations  Gastrointestinal: no nausea or vomiting, no abdominal pain or change in bowel habits  Genitourinary: no hematuria or dysuria; denies frequency  Hematologic/Lymphatic: no easy bruising or lymphadenopathy  Musculoskeletal: no arthralgias or myalgias  Neurological: no seizures or tremors  Endocrine: no heat or cold " "intolerance      PHYSICAL EXAM  VITAL SIGNS: /70 (BP Location: Left arm, Patient Position: Sitting, BP Method: Large (Manual))   Pulse 80   Temp 98.5 °F (36.9 °C) (Oral)   Ht 5' 4" (1.626 m)   Wt 93.6 kg (206 lb 7.4 oz)   LMP 11/30/2018   SpO2 97%   BMI 35.44 kg/m²   GEN: Well developed, Well nourished, No acute distress.  HENT: Normocephalic, Atraumatic, Bilateral external ears normal, Nose normal, Oropharynx moist, No oral exudates.   Eyes: PERRLA, EOMI, Conjunctiva normal, No discharge.   Neck: Supple, No tenderness.  Lymphatic: No cervical or supraclavicular lymphadenopathy noted.   Cardiovascular: Normal heart rate, Normal rhythm, No murmurs, No rubs, No gallops.   Thorax & Lungs: Normal breath sounds, No respiratory distress, No wheezing.  Abdomen: Soft, No tenderness, Bowel sounds normal.  Breast: Deferred  Genital: Deferred  Skin: Warm, Dry, No erythema, No rash.   Extremities: No edema, No tenderness.       ASSESSMENT/PLAN    Manasa was seen today for annual exam.    Diagnoses and all orders for this visit:    Well woman exam (no gynecological exam)      Health Maintenance: up to date  Preventative Care: annual eye and dental exams      FOLLOW UP: Follow-up in about 1 year (around 12/10/2019).    "

## 2018-12-19 DIAGNOSIS — K11.1 PAROTID GLAND ENLARGEMENT: ICD-10-CM

## 2018-12-19 RX ORDER — HYDROXYCHLOROQUINE SULFATE 200 MG/1
400 TABLET, FILM COATED ORAL DAILY
Qty: 120 TABLET | Refills: 0 | Status: SHIPPED | OUTPATIENT
Start: 2018-12-19 | End: 2019-01-16 | Stop reason: SDUPTHER

## 2018-12-24 ENCOUNTER — OFFICE VISIT (OUTPATIENT)
Dept: OBSTETRICS AND GYNECOLOGY | Facility: CLINIC | Age: 43
End: 2018-12-24
Payer: COMMERCIAL

## 2018-12-24 VITALS
SYSTOLIC BLOOD PRESSURE: 110 MMHG | DIASTOLIC BLOOD PRESSURE: 72 MMHG | HEIGHT: 64 IN | BODY MASS INDEX: 35.24 KG/M2 | WEIGHT: 206.44 LBS

## 2018-12-24 DIAGNOSIS — N92.1 BREAKTHROUGH BLEEDING: ICD-10-CM

## 2018-12-24 DIAGNOSIS — N89.8 VAGINAL DISCHARGE: Primary | ICD-10-CM

## 2018-12-24 LAB
B-HCG UR QL: NEGATIVE
CANDIDA RRNA VAG QL PROBE: NEGATIVE
CTP QC/QA: YES
G VAGINALIS RRNA GENITAL QL PROBE: POSITIVE
T VAGINALIS RRNA GENITAL QL PROBE: NEGATIVE

## 2018-12-24 PROCEDURE — 99999 PR PBB SHADOW E&M-EST. PATIENT-LVL III: CPT | Mod: PBBFAC,,, | Performed by: OBSTETRICS & GYNECOLOGY

## 2018-12-24 PROCEDURE — 81025 URINE PREGNANCY TEST: CPT | Mod: S$GLB,,, | Performed by: OBSTETRICS & GYNECOLOGY

## 2018-12-24 PROCEDURE — 99202 OFFICE O/P NEW SF 15 MIN: CPT | Mod: S$GLB,,, | Performed by: OBSTETRICS & GYNECOLOGY

## 2018-12-24 PROCEDURE — 87591 N.GONORRHOEAE DNA AMP PROB: CPT

## 2018-12-24 PROCEDURE — 87491 CHLMYD TRACH DNA AMP PROBE: CPT

## 2018-12-24 PROCEDURE — 87660 TRICHOMONAS VAGIN DIR PROBE: CPT

## 2018-12-24 PROCEDURE — 3008F BODY MASS INDEX DOCD: CPT | Mod: CPTII,S$GLB,, | Performed by: OBSTETRICS & GYNECOLOGY

## 2018-12-24 NOTE — PROGRESS NOTES
History & Physical  Gynecology      SUBJECTIVE:     Chief Complaint: Vaginal Discharge and Pelvic Pain       History of Present Illness:  Ms. Hollins is a 44 y/o who reports to clinic c/o dark brown discharge after intercourse two after  menses. She reports that she normally has BTB with periods. She reports that she would have normal periods which last 2 days then would 2 days the 2 consecutive weeks following period. She reports that this stated a few months ago where this occurs intermittently. She reports that it is not always associated with intercourse.    Mother passed away with metastic adenocarcinoma but think that it was a GYN primary but patient is unsure.     Review of patient's allergies indicates:   Allergen Reactions    Sulfa dyne Shortness Of Breath     Other reaction(s): CAUSES ASTHMA ATTACK, Is not sure of the name of the medication that she is allergic to       Past Medical History:   Diagnosis Date    Asthma     General anesthetics causing adverse effect in therapeutic use     GERD (gastroesophageal reflux disease)     Iron deficiency anemia     Sjogren's disease      Past Surgical History:   Procedure Laterality Date    CAUTERIZATION OF TURBINATES Bilateral 2014    [facility]     SECTION      x4    ESOPHAGOGASTRODUODENOSCOPY (EGD) N/A 2016    Per[facility]c [facility] END[facility] (4TH FLR)    MYOMECTOMY      RECONSTRUCTION-NASAL N/A 2014    [facility] at [facility]    RESECTION-TURBINATES (SMR) Bilateral 2017    Performed by Sawyer Bernardo [facility] at [facility] OR 2ND FLR    SINUS SURGERY FUNCTIONAL ENDOSCOPIC WITH NAVIGATION Bilateral 2017    Per[facility] at [facility] OR 2ND FLR    SINUS SURGERY FUNCTIONAL ENDOSCOPIC WITH NAVIGATION N/A 2014    Performed by [facility] at [facility]    TUBAL LIGATION      done during myometomy surgery.    upper gi  2016      OB History      Para Term  AB Living    4 4 4     3    SAB TAB Ectopic Multiple Live Births                     Family History   Problem Relation Age of Onset    Cancer Mother         Lung/Cervical    Cancer Maternal Grandmother         Breast/Cervical    Cancer Other         Breast     Asthma Child     Emphysema Neg Hx     Colon cancer Neg Hx     Stomach cancer Neg Hx     Esophageal cancer Neg Hx     Ulcerative colitis Neg Hx     Crohn's disease Neg Hx     Irritable bowel syndrome Neg Hx     Celiac disease Neg Hx      Social History     Tobacco Use    Smoking status: Never Smoker    Smokeless tobacco: Never Used   Substance Use Topics    Alcohol use: No     Alcohol/week: 0.0 oz    Drug use: No       Current Outpatient Medications   Medication Sig    acetaminophen (TYLENOL ARTHRITIS ORAL) Take by mouth.    albuterol (PROAIR HFA) 90 mcg/actuation inhaler INHALE 2 PUFFS INTO THE LUNGS EVERY 4 HOURS AS NEEDED    albuterol (PROAIR HFA) 90 mcg/actuation inhaler INHALE 2 PUFFS INTO THE LUNGS EVERY 4 HOURS AS NEEDED    b complex vitamins tablet Take 1 tablet by mouth every morning.     CALCIUM CARBONATE/VITAMIN D3 (VITAMIN D-3 ORAL) Take 4,000 Units by mouth every morning.     calcium-magnesium-zinc 333-133-5 mg Tab Take 1 tablet by mouth every morning.     CARBOXYMETHYLCELLULOSE SODIUM (REFRESH TEARS OPHT) Apply 2 drops to eye as needed.     cholecalciferol, vitamin D3, (VITAMIN D3) 5,000 unit Tab Take 5,000 Units by mouth every morning.    hydroxychloroquine (PLAQUENIL) 200 mg tablet Take 2 tablets (400 mg total) by mouth once daily.    immun glob G,IgG,-pro-IgA 0-50 (HIZENTRA) 10 gram/50 mL (20 %) Soln Inject 12 g into the skin once a week.    krill-om-3-dha-epa-phospho-ast (MEGARED OMEGA-3 KRILL OIL) 282-84-09-50 mg Cap Take by mouth.    levocetirizine (XYZAL) 5 MG tablet Take 1 tablet (5 mg total) by mouth every evening.    linaclotide (LINZESS) 145 mcg Cap capsule Take 1  capsule (145 mcg total) by mouth once daily.    omeprazole (PRILOSEC) 40 MG capsule Take 1 capsule (40 mg total) by mouth every morning.    pilocarpine HCl (SALAGEN) 7.5 mg tablet Take 1 tablet (7.5 mg total) by mouth 4 (four) times daily.    PREVIDENT 5000 PLUS 1.1 % Crea     saliva stimulant agents comb.3 (BIOTENE MOISTURIZING MOUTH) Spry by Mucous Membrane route as needed.     SALIVA SUBSTITUTION COMBO NO.9 (BIOTENE DRY MOUTH ORAL RINSE MM) by Mucous Membrane route as needed.     triamcinolone acetonide 0.1% (KENALOG) 0.1 % ointment Apply topically 2 (two) times daily.    vitamin A 8000 UNIT capsule Take 8,000 Units by mouth every morning.     PREVIDENT 0.2 % Soln USE AS DIRECTED    UNABLE TO FIND Take 1 tablet by mouth every morning. Kavin red vitamins 500 mg     No current facility-administered medications for this visit.      Review of Systems:  Review of Systems   Constitutional: Negative for chills and fever.   Eyes: Negative for visual disturbance.   Respiratory: Negative for cough and wheezing.    Cardiovascular: Negative for chest pain and palpitations.   Gastrointestinal: Negative for abdominal pain, nausea and vomiting.   Genitourinary: Positive for menstrual problem and vaginal discharge. Negative for dysuria, frequency, hematuria, pelvic pain, vaginal bleeding and vaginal pain.        OBJECTIVE:     Physical Exam:  Physical Exam   Constitutional: She is oriented to person, place, and time. She appears well-developed and well-nourished.   Cardiovascular: Normal rate.   Abdominal: Soft. She exhibits no distension. There is no tenderness.   Genitourinary: Uterus normal.   Genitourinary Comments: Uterus palpable and retroverted, approximately 8 week size. White physiologic discharge in vault   Neurological: She is alert and oriented to person, place, and time.   Skin: Skin is warm and dry.   Psychiatric: She has a normal mood and affect.   Nursing note and vitals reviewed.    ASSESSMENT:        ICD-10-CM ICD-9-CM    1. Vaginal discharge N89.8 623.5 Vaginosis Screen by DNA Probe      C. trachomatis/N. gonorrhoeae by AMP DNA   2. Breakthrough bleeding N92.1 626.6 POCT URINE DIPSTICK WITHOUT MICROSCOPE      POCT urine pregnancy       Plan:      Manasa was seen today for vaginal discharge and pelvic pain.    Diagnoses and all orders for this visit:    Vaginal discharge  -     Vaginosis Screen by DNA Probe  -     C. trachomatis/N. gonorrhoeae by AMP DNA    Breakthrough bleeding  -     POCT URINE DIPSTICK WITHOUT MICROSCOPE  -     POCT urine pregnancy        Orders Placed This Encounter   Procedures    Vaginosis Screen by DNA Probe    C. trachomatis/N. gonorrhoeae by AMP DNA    POCT URINE DIPSTICK WITHOUT MICROSCOPE    POCT urine pregnancy       Follow-up in about 4 weeks (around 1/21/2019), or if symptoms worsen or fail to improve.    Counseling time: 15 minutes    Ramiro Wilkerson

## 2018-12-24 NOTE — PATIENT INSTRUCTIONS
Preventing Vaginitis     Use mild, unscented soap when you bathe or shower to avoid irritating your vagina.    Vaginitis is irritation or infection of the vagina or vulva (the outside opening of the vagina). Vaginitis can be caused by bacteria, viruses, parasites, or yeast. Chemicals (such as in perfumes or soaps or in spermicides) can sometimes be a cause. Vaginitis can be caused by hormone changes in pregnancy or with menopause. You can help prevent vaginitis. Follow the tips below. And see your healthcare provider if you have any symptoms.  Hygiene  · Avoid chemicals. Do not use vaginal sprays. Do not use scented toilet paper or tampons that are scented. Sprays and scents have chemicals that can irritate your vagina.  · Do not douche unless you are told to by your healthcare provider. Douching is rarely needed. And it upsets the normal balance in the vagina.  · Wash yourself well. Wash the outer vaginal area (vulva) every day with mild, unscented soap. Keep it as dry as possible.  · Wipe correctly. Make sure to wipe from front to back after a bowel movement. This helps keep from spreading bacteria from your anus to your vagina.  · Change your tampon often. During your period, make sure to change your tampon as often as directed on the package. This allows the normal flow of vaginal discharge and blood.  Lifestyle  · Limit your number of sexual partners. The more partners you have, the greater your risk of infection. Using condoms helps reduce your risk.  · Get enough sleep. Sleep helps keep your bodys immune system healthy. This helps you fight infection.  · Lose weight, if needed. Excess weight can reduce air circulation around your vagina. This can increase your risk of infection.  · Exercise regularly. Regular activity helps keep your body healthy.  · Take antibiotics only as directed. Antibiotics can change the normal chemical balance in the vagina.    Clothing  · Dont sit in wet clothes. Yeast thrives  when its warm and damp.  · Dont wear tight pants. And dont wear tights, leggings, or hose without a cotton crotch. These types of clothing trap warmth and moisture.  · Wear cotton underwear. Cotton lets air circulate around the vagina.  Symptoms of vaginitis  · Irritation, swelling, or itching of the genital area  · Vaginal discharge  · Bad vaginal odor  · Pain or burning during urination   Date Last Reviewed: 12/1/2016 © 2000-2017 [employer]. [address] [place] [address]. All rights reserved. This information is not intended as a substitute for professional medical care. Always follow your healthcare professional's instructions.        Dysfunctional Uterine Bleeding    Dysfunctional uterine bleeding is a condition in which bleeding is abnormal and occurs at unexpected times of the month. This happens because of changes in the hormones that help control a womans menstrual cycle each month.  The bleeding may be heavier or lighter than normal. If you have heavy bleeding often, this can lead to a problem called anemia. With anemia, your red blood cell count is too low. Red blood cells are needed because they help carry oxygen throughout your body. Severe anemia may cause you to look pale and feel very weak or tired. You might also become short of breath easily.  To treat dysfunctional uterine bleeding, medicines are often tried first. If these dont help, further testing and treatments may be needed. Discuss all of your options with your provider.  Home care  Medicines  If youre prescribed medicines, be sure to take them as directed. Some of the more common medicines you may be prescribed include:  · Hormone therapy (Options include most methods of hormonal birth control such as pills, shots, or a hormone-releasing IUD)  · Nonsteroidal anti-inflammatory drugs (NSAIDs), such as ibuprofen  · Iron supplements, if you have anemia     General care  · Get plenty of rest if you tire easily.  Avoid heavy exertion.  · To help relieve pain or cramping that may occur with bleeding, try using a heating pad on the lower belly or back. A warm bath may also help.  Follow-up care  Follow up with your healthcare provider as directed.  When to seek medical advice  Call your healthcare provider right away if:  · Bleeding becomes heavy (soaking 1 pad or tampon every hour for 3 hours)  · Increased abdominal pain  · Irregular bleeding worsens or does not get better even with treatment  · Fever of 100.4ºF (38ºC) or higher, or as directed by your provider  · Signs of anemia, such as pale skin, extreme fatigue or weakness, or shortness of breath  · Dizziness or fainting   Date Last Reviewed: 6/11/2015  © 2168-3908 [employer]. [address] [place] [address], [place] [address]. All rights reserved. This information is not intended as a substitute for professional medical care. Always follow your healthcare professional's instructions.

## 2018-12-25 LAB
C TRACH DNA SPEC QL NAA+PROBE: NOT DETECTED
N GONORRHOEA DNA SPEC QL NAA+PROBE: NOT DETECTED

## 2018-12-26 DIAGNOSIS — N76.0 BV (BACTERIAL VAGINOSIS): Primary | ICD-10-CM

## 2018-12-26 DIAGNOSIS — B96.89 BV (BACTERIAL VAGINOSIS): Primary | ICD-10-CM

## 2018-12-26 RX ORDER — METRONIDAZOLE 500 MG/1
500 TABLET ORAL EVERY 12 HOURS
Qty: 14 TABLET | Refills: 0 | Status: SHIPPED | OUTPATIENT
Start: 2018-12-26 | End: 2019-01-02

## 2019-01-16 DIAGNOSIS — K11.1 PAROTID GLAND ENLARGEMENT: ICD-10-CM

## 2019-01-16 RX ORDER — HYDROXYCHLOROQUINE SULFATE 200 MG/1
400 TABLET, FILM COATED ORAL DAILY
Qty: 120 TABLET | Refills: 0 | Status: SHIPPED | OUTPATIENT
Start: 2019-01-16 | End: 2019-03-28 | Stop reason: SDUPTHER

## 2019-01-24 DIAGNOSIS — K21.9 GASTROESOPHAGEAL REFLUX DISEASE WITHOUT ESOPHAGITIS: ICD-10-CM

## 2019-01-24 RX ORDER — OMEPRAZOLE 40 MG/1
40 CAPSULE, DELAYED RELEASE ORAL EVERY MORNING
Qty: 90 CAPSULE | Refills: 3 | OUTPATIENT
Start: 2019-01-24

## 2019-01-25 NOTE — TELEPHONE ENCOUNTER
Spoke with pt and explained that she need to schedule a appt with Mehnaz to discuss her rx refill. Appt was scheduled.

## 2019-01-27 DIAGNOSIS — K21.9 GASTROESOPHAGEAL REFLUX DISEASE WITHOUT ESOPHAGITIS: ICD-10-CM

## 2019-01-28 RX ORDER — OMEPRAZOLE 40 MG/1
CAPSULE, DELAYED RELEASE ORAL
Qty: 90 CAPSULE | Refills: 3 | OUTPATIENT
Start: 2019-01-28

## 2019-02-22 RX ORDER — PILOCARPINE HYDROCHLORIDE 7.5 MG/1
7.5 TABLET, FILM COATED ORAL 4 TIMES DAILY
Qty: 120 TABLET | Refills: 11 | Status: SHIPPED | OUTPATIENT
Start: 2019-02-22 | End: 2020-01-14 | Stop reason: SDUPTHER

## 2019-02-25 ENCOUNTER — PATIENT MESSAGE (OUTPATIENT)
Dept: FAMILY MEDICINE | Facility: CLINIC | Age: 44
End: 2019-02-25

## 2019-02-27 DIAGNOSIS — K59.09 CHRONIC CONSTIPATION: ICD-10-CM

## 2019-02-27 RX ORDER — LINACLOTIDE 145 UG/1
CAPSULE, GELATIN COATED ORAL
Qty: 90 CAPSULE | Refills: 2 | OUTPATIENT
Start: 2019-02-27

## 2019-03-13 ENCOUNTER — LAB VISIT (OUTPATIENT)
Dept: LAB | Facility: HOSPITAL | Age: 44
End: 2019-03-13
Payer: COMMERCIAL

## 2019-03-13 ENCOUNTER — PATIENT MESSAGE (OUTPATIENT)
Dept: ALLERGY | Facility: CLINIC | Age: 44
End: 2019-03-13

## 2019-03-13 ENCOUNTER — OFFICE VISIT (OUTPATIENT)
Dept: GASTROENTEROLOGY | Facility: CLINIC | Age: 44
End: 2019-03-13
Payer: COMMERCIAL

## 2019-03-13 ENCOUNTER — PATIENT MESSAGE (OUTPATIENT)
Dept: FAMILY MEDICINE | Facility: CLINIC | Age: 44
End: 2019-03-13

## 2019-03-13 VITALS
DIASTOLIC BLOOD PRESSURE: 73 MMHG | HEART RATE: 74 BPM | BODY MASS INDEX: 34.13 KG/M2 | SYSTOLIC BLOOD PRESSURE: 110 MMHG | HEIGHT: 64 IN | WEIGHT: 199.94 LBS

## 2019-03-13 DIAGNOSIS — Z12.11 SCREEN FOR COLON CANCER: ICD-10-CM

## 2019-03-13 DIAGNOSIS — Z79.899 ENCOUNTER FOR MONITORING LONG-TERM PROTON PUMP INHIBITOR THERAPY: ICD-10-CM

## 2019-03-13 DIAGNOSIS — K59.09 CHRONIC CONSTIPATION: ICD-10-CM

## 2019-03-13 DIAGNOSIS — K21.9 GASTROESOPHAGEAL REFLUX DISEASE WITHOUT ESOPHAGITIS: Primary | ICD-10-CM

## 2019-03-13 DIAGNOSIS — Z51.81 ENCOUNTER FOR MONITORING LONG-TERM PROTON PUMP INHIBITOR THERAPY: ICD-10-CM

## 2019-03-13 LAB
25(OH)D3+25(OH)D2 SERPL-MCNC: 21 NG/ML
MAGNESIUM SERPL-MCNC: 1.7 MG/DL
VIT B12 SERPL-MCNC: 1297 PG/ML

## 2019-03-13 PROCEDURE — 36415 COLL VENOUS BLD VENIPUNCTURE: CPT

## 2019-03-13 PROCEDURE — 3008F BODY MASS INDEX DOCD: CPT | Mod: CPTII,S$GLB,, | Performed by: NURSE PRACTITIONER

## 2019-03-13 PROCEDURE — 99999 PR PBB SHADOW E&M-EST. PATIENT-LVL V: CPT | Mod: PBBFAC,,, | Performed by: NURSE PRACTITIONER

## 2019-03-13 PROCEDURE — 3008F PR BODY MASS INDEX (BMI) DOCUMENTED: ICD-10-PCS | Mod: CPTII,S$GLB,, | Performed by: NURSE PRACTITIONER

## 2019-03-13 PROCEDURE — 82607 VITAMIN B-12: CPT

## 2019-03-13 PROCEDURE — 99999 PR PBB SHADOW E&M-EST. PATIENT-LVL V: ICD-10-PCS | Mod: PBBFAC,,, | Performed by: NURSE PRACTITIONER

## 2019-03-13 PROCEDURE — 99213 PR OFFICE/OUTPT VISIT, EST, LEVL III, 20-29 MIN: ICD-10-PCS | Mod: S$GLB,,, | Performed by: NURSE PRACTITIONER

## 2019-03-13 PROCEDURE — 99213 OFFICE O/P EST LOW 20 MIN: CPT | Mod: S$GLB,,, | Performed by: NURSE PRACTITIONER

## 2019-03-13 PROCEDURE — 82306 VITAMIN D 25 HYDROXY: CPT

## 2019-03-13 PROCEDURE — 83735 ASSAY OF MAGNESIUM: CPT

## 2019-03-13 RX ORDER — OMEPRAZOLE 40 MG/1
40 CAPSULE, DELAYED RELEASE ORAL EVERY MORNING
Qty: 90 CAPSULE | Refills: 3 | Status: SHIPPED | OUTPATIENT
Start: 2019-03-13 | End: 2020-03-13 | Stop reason: SDUPTHER

## 2019-03-13 NOTE — PROGRESS NOTES
" [facility] Note    Reason for Visit:  Gastroesophageal reflux    PCP: Damon Shields     Referring MD:     HPI:  This is a 43 y.o. female here for f/u evaluation of gastroesophageal reflux and constipation. My last visit with Ms. Hollins ("Kaylyn") was in 2017.     GERD-well controlled with omeprazole 40 mg once daily. Has been off for 2 days with heart palpitations.   Abdominal pain-denies.  Dysphagia-has Sjogrens. controlled with keeping membranes moist with biotene and swallow precautions.  Constipation-better.  takes linzess 145 mcg once weekly and miralax every other day. Avoids cheese, chocolate, eggs, bread, limits potatoes. Having 1 bristol type 3-4 BM/day  GI bleeding - denies hematochezia, hematemesis, melena, BRBPR, black/tarry stools, and coffee ground emesis     ROS:  Constitutional: No fevers, no chills, No unintentional weight loss, no increase in fatigue,   ENT: + allergies  CV: No chest pain, no palpitations, no perif. edema, no sob on exertion  Pulm: no cough, No shortness of breath, +occasional wheezes s/p asthma,  Ophtho: No vision changes  GI: see HPI; also no nausea, no vomiting, no change in appetite  Derm: No rash  Heme: No lymphadenopathy, No bruising  MSK: + arthritis hands and knees, no muscle pain, no muscle weakness  : No dysuria, No hematuria  Endo: No hot or cold intolerance  Neuro: No syncope, No seizure,     Medical History:  has a past medical history of Asthma, General anesthetics causing adverse effect in therapeutic use, GERD (gastroesophageal reflux disease), Iron deficiency anemia, and Sjogren's disease.    Surgical History:  has a past surgical history that includes  section; Myomectomy; Tubal ligation; and upper gi (2016).    Family History: family history includes Asthma in her child; Cancer in her maternal grandmother, mother, and other..     Social History:  reports that  has never smoked. she has never used smokeless tobacco. She " reports that she does not drink alcohol or use drugs.    Allergies   Allergen Reactions    Sulfa Brittany Shortness Of Breath     Other reaction(s): CAUSES ASTHMA ATTACK, Is not sure of the name of the medication that she is allergic to       Current Outpatient Medications   Medication Sig    acetaminophen (TYLENOL ARTHRITIS ORAL) Take by mouth.    albuterol (PROAIR HFA) 90 mcg/actuation inhaler INHALE 2 PUFFS INTO THE LUNGS EVERY 4 HOURS AS NEEDED    albuterol (PROAIR HFA) 90 mcg/actuation inhaler INHALE 2 PUFFS INTO THE LUNGS EVERY 4 HOURS AS NEEDED    b complex vitamins tablet Take 1 tablet by mouth every morning.     CALCIUM CARBONATE/VITAMIN D3 (VITAMIN D-3 ORAL) Take 4,000 Units by mouth every morning.     calcium-magnesium-zinc 333-133-5 mg Tab Take 1 tablet by mouth every morning.     CARBOXYMETHYLCELLULOSE SODIUM (REFRESH TEARS OPHT) Apply 2 drops to eye as needed.     cholecalciferol, vitamin D3, (VITAMIN D3) 5,000 unit Tab Take 5,000 Units by mouth every morning.    hydroxychloroquine (PLAQUENIL) 200 mg tablet Take 2 tablets (400 mg total) by mouth once daily.    immun glob G,IgG,-pro-IgA 0-50 (HIZENTRA) 10 gram/50 mL (20 %) Soln Inject 12 g into the skin once a week.    krill-om-3-dha-epa-phospho-ast (MEGARED OMEGA-3 KRILL OIL) 553-06-90-50 mg Cap Take by mouth.    levocetirizine (XYZAL) 5 MG tablet Take 1 tablet (5 mg total) by mouth every evening.    linaclotide (LINZESS) 145 mcg Cap capsule Take 1 capsule (145 mcg total) by mouth once daily.    pilocarpine HCl (SALAGEN) 7.5 mg tablet Take 1 tablet (7.5 mg total) by mouth 4 (four) times daily.    PREVIDENT 0.2 % Soln USE AS DIRECTED    PREVIDENT 5000 PLUS 1.1 % Crea     saliva stimulant agents comb.3 (BIOTENE MOISTURIZING MOUTH) Spry by Mucous Membrane route as needed.     SALIVA SUBSTITUTION COMBO NO.9 (BIOTENE DRY MOUTH ORAL RINSE MM) by Mucous Membrane route as needed.     triamcinolone acetonide 0.1% (KENALOG) 0.1 % ointment Apply  "topically 2 (two) times daily.    UNABLE TO FIND Take 1 tablet by mouth every morning. Kavin red vitamins 500 mg    vitamin A 8000 UNIT capsule Take 8,000 Units by mouth every morning.     omeprazole (PRILOSEC) 40 MG capsule Take 1 capsule (40 mg total) by mouth every morning.     No current facility-administered medications for this visit.        Objective Findings:    Vital Signs:  /73   Pulse 74   Ht 5' 4" (1.626 m)   Wt 90.7 kg (199 lb 15.3 oz)   BMI 34.32 kg/m²   Body mass index is 34.32 kg/m².    Physical Exam:  General Appearance: Well appearing in no acute distress  Head:   Normocephalic, without obvious abnormality  Eyes:    No scleral icterus, EOMI  ENT: Neck supple, Lips, mucosa, and tongue normal; teeth and gums normal  Lungs: CTA bilaterally in anterior and posterior fields, no wheezes, no crackles.  Heart:  Regular rate and rhythm, S1, S2 normal, no murmurs heard  Abdomen: Soft, non tender, non distended with positive bowel sounds in all four quadrants. No hepatosplenomegaly, ascites, or mass  Extremities: 2+ radial pulses, no clubbing, cyanosis or edema  Extremities:no clubbing, cyanosis or edema  Skin: No rash to exposed areas  Neurologic: A&Ox4    Labs:  Lab Results   Component Value Date    WBC 5.06 12/05/2018    HGB 9.1 (L) 12/05/2018    HCT 29.1 (L) 12/05/2018     12/05/2018    CHOL 116 (L) 11/22/2017    TRIG 68 11/22/2017    HDL 46 11/22/2017    ALT 15 12/05/2018    AST 21 12/05/2018     12/05/2018    K 4.3 12/05/2018     12/05/2018    CREATININE 0.7 12/05/2018    BUN 9 12/05/2018    CO2 23 12/05/2018    TSH 1.081 11/22/2017    INR 1.0 05/29/2014         Endoscopy:    6/24/2016 EGD Dr. Merida-small hiatal hernia  Colonoscopy-none  Assessment:  1. Gastroesophageal reflux disease without esophagitis    2. Encounter for monitoring long-term proton pump inhibitor therapy    3. Chronic constipation    4. Screen for colon cancer           Recommendations:  1.GERD-well " controlled with pantoprazole 40 mg once daily. RX refilled.  2.  Pt reminded of slightly increased risk of CAP, C diff infections and decreased mag, vit D and Vit B- 12 levels with long term PPI use. Yearly Mag, B 12 and Vit D levels per pcp recomended. Routine bone mineral densities per PCP recommended. Pt instructed to contact PCP for s/s of lung infection (fever, chills, CP, SOB, and/or productive cough) or GI infection (profuse, watery diarrhea with or without rectal bleeding, severe abd pain,and/or fever). Vit D, B12, mag today  2. Constipation-well controlled. Continue linzess and miralax.     Follow-up in about 1 year (around 3/13/2020) for long term PPI use.      Order summary:  Orders Placed This Encounter    Vitamin D    Magnesium    Vitamin B12    linaclotide (LINZESS) 145 mcg Cap capsule    omeprazole (PRILOSEC) 40 MG capsule    Case request GI: COLONOSCOPY         Thank you so much for allowing me to participate in the care of MICHEL Walker,FNP-C

## 2019-03-21 ENCOUNTER — PATIENT MESSAGE (OUTPATIENT)
Dept: ENDOSCOPY | Facility: HOSPITAL | Age: 44
End: 2019-03-21

## 2019-03-28 ENCOUNTER — OFFICE VISIT (OUTPATIENT)
Dept: RHEUMATOLOGY | Facility: CLINIC | Age: 44
End: 2019-03-28
Payer: COMMERCIAL

## 2019-03-28 ENCOUNTER — LAB VISIT (OUTPATIENT)
Dept: LAB | Facility: HOSPITAL | Age: 44
End: 2019-03-28
Attending: INTERNAL MEDICINE
Payer: COMMERCIAL

## 2019-03-28 VITALS
SYSTOLIC BLOOD PRESSURE: 115 MMHG | HEART RATE: 90 BPM | WEIGHT: 202.19 LBS | DIASTOLIC BLOOD PRESSURE: 71 MMHG | BODY MASS INDEX: 34.52 KG/M2 | HEIGHT: 64 IN

## 2019-03-28 DIAGNOSIS — E55.9 VITAMIN D INSUFFICIENCY: ICD-10-CM

## 2019-03-28 DIAGNOSIS — E66.9 OBESITY (BMI 30.0-34.9): ICD-10-CM

## 2019-03-28 DIAGNOSIS — K11.1 PAROTID GLAND ENLARGEMENT: ICD-10-CM

## 2019-03-28 DIAGNOSIS — D80.3 IGG2 DEFICIENCY: ICD-10-CM

## 2019-03-28 DIAGNOSIS — D64.9 ANEMIA, UNSPECIFIED TYPE: ICD-10-CM

## 2019-03-28 DIAGNOSIS — Z79.899 LONG-TERM USE OF HYDROXYCHLOROQUINE: ICD-10-CM

## 2019-03-28 DIAGNOSIS — M35.09 SJOGREN'S SYNDROME WITH OTHER ORGAN INVOLVEMENT: Primary | ICD-10-CM

## 2019-03-28 DIAGNOSIS — M35.09 SJOGREN'S SYNDROME WITH OTHER ORGAN INVOLVEMENT: ICD-10-CM

## 2019-03-28 LAB
ALBUMIN SERPL BCP-MCNC: 3 G/DL (ref 3.5–5.2)
ALP SERPL-CCNC: 32 U/L (ref 55–135)
ALT SERPL W/O P-5'-P-CCNC: 16 U/L (ref 10–44)
ANION GAP SERPL CALC-SCNC: 7 MMOL/L (ref 8–16)
AST SERPL-CCNC: 23 U/L (ref 10–40)
BASOPHILS # BLD AUTO: 0.02 K/UL (ref 0–0.2)
BASOPHILS NFR BLD: 0.3 % (ref 0–1.9)
BILIRUB SERPL-MCNC: 0.4 MG/DL (ref 0.1–1)
BUN SERPL-MCNC: 8 MG/DL (ref 6–20)
CALCIUM SERPL-MCNC: 9.1 MG/DL (ref 8.7–10.5)
CHLORIDE SERPL-SCNC: 105 MMOL/L (ref 95–110)
CO2 SERPL-SCNC: 23 MMOL/L (ref 23–29)
CREAT SERPL-MCNC: 0.8 MG/DL (ref 0.5–1.4)
CRP SERPL-MCNC: 5.7 MG/L (ref 0–8.2)
DIFFERENTIAL METHOD: ABNORMAL
EOSINOPHIL # BLD AUTO: 0.2 K/UL (ref 0–0.5)
EOSINOPHIL NFR BLD: 2.5 % (ref 0–8)
ERYTHROCYTE [DISTWIDTH] IN BLOOD BY AUTOMATED COUNT: 14.3 % (ref 11.5–14.5)
ERYTHROCYTE [SEDIMENTATION RATE] IN BLOOD BY WESTERGREN METHOD: 104 MM/HR (ref 0–36)
EST. GFR  (AFRICAN AMERICAN): >60 ML/MIN/1.73 M^2
EST. GFR  (NON AFRICAN AMERICAN): >60 ML/MIN/1.73 M^2
FERRITIN SERPL-MCNC: 23 NG/ML (ref 20–300)
GLUCOSE SERPL-MCNC: 80 MG/DL (ref 70–110)
HCT VFR BLD AUTO: 30.2 % (ref 37–48.5)
HGB BLD-MCNC: 9.5 G/DL (ref 12–16)
IMM GRANULOCYTES # BLD AUTO: 0.02 K/UL (ref 0–0.04)
IMM GRANULOCYTES NFR BLD AUTO: 0.3 % (ref 0–0.5)
IRON SERPL-MCNC: 37 UG/DL (ref 30–160)
LYMPHOCYTES # BLD AUTO: 1.5 K/UL (ref 1–4.8)
LYMPHOCYTES NFR BLD: 24.7 % (ref 18–48)
MCH RBC QN AUTO: 28.4 PG (ref 27–31)
MCHC RBC AUTO-ENTMCNC: 31.5 G/DL (ref 32–36)
MCV RBC AUTO: 90 FL (ref 82–98)
MONOCYTES # BLD AUTO: 0.4 K/UL (ref 0.3–1)
MONOCYTES NFR BLD: 7.2 % (ref 4–15)
NEUTROPHILS # BLD AUTO: 3.9 K/UL (ref 1.8–7.7)
NEUTROPHILS NFR BLD: 65 % (ref 38–73)
NRBC BLD-RTO: 0 /100 WBC
PLATELET # BLD AUTO: 225 K/UL (ref 150–350)
PMV BLD AUTO: 11.1 FL (ref 9.2–12.9)
POTASSIUM SERPL-SCNC: 3.7 MMOL/L (ref 3.5–5.1)
PROT SERPL-MCNC: 10.5 G/DL (ref 6–8.4)
RBC # BLD AUTO: 3.34 M/UL (ref 4–5.4)
SATURATED IRON: 10 % (ref 20–50)
SODIUM SERPL-SCNC: 135 MMOL/L (ref 136–145)
TOTAL IRON BINDING CAPACITY: 363 UG/DL (ref 250–450)
TRANSFERRIN SERPL-MCNC: 245 MG/DL (ref 200–375)
TRANSFERRIN SERPL-MCNC: 245 MG/DL (ref 200–375)
WBC # BLD AUTO: 6 K/UL (ref 3.9–12.7)

## 2019-03-28 PROCEDURE — 80053 COMPREHEN METABOLIC PANEL: CPT

## 2019-03-28 PROCEDURE — 85025 COMPLETE CBC W/AUTO DIFF WBC: CPT

## 2019-03-28 PROCEDURE — 84238 ASSAY NONENDOCRINE RECEPTOR: CPT

## 2019-03-28 PROCEDURE — 99999 PR PBB SHADOW E&M-EST. PATIENT-LVL IV: ICD-10-PCS | Mod: PBBFAC,,, | Performed by: INTERNAL MEDICINE

## 2019-03-28 PROCEDURE — 3008F PR BODY MASS INDEX (BMI) DOCUMENTED: ICD-10-PCS | Mod: CPTII,S$GLB,, | Performed by: INTERNAL MEDICINE

## 2019-03-28 PROCEDURE — 99214 PR OFFICE/OUTPT VISIT, EST, LEVL IV, 30-39 MIN: ICD-10-PCS | Mod: S$GLB,,, | Performed by: INTERNAL MEDICINE

## 2019-03-28 PROCEDURE — 82728 ASSAY OF FERRITIN: CPT

## 2019-03-28 PROCEDURE — 36415 COLL VENOUS BLD VENIPUNCTURE: CPT

## 2019-03-28 PROCEDURE — 86039 ANTINUCLEAR ANTIBODIES (ANA): CPT

## 2019-03-28 PROCEDURE — 99214 OFFICE O/P EST MOD 30 MIN: CPT | Mod: S$GLB,,, | Performed by: INTERNAL MEDICINE

## 2019-03-28 PROCEDURE — 86235 NUCLEAR ANTIGEN ANTIBODY: CPT | Mod: 59

## 2019-03-28 PROCEDURE — 83540 ASSAY OF IRON: CPT

## 2019-03-28 PROCEDURE — 85652 RBC SED RATE AUTOMATED: CPT

## 2019-03-28 PROCEDURE — 3008F BODY MASS INDEX DOCD: CPT | Mod: CPTII,S$GLB,, | Performed by: INTERNAL MEDICINE

## 2019-03-28 PROCEDURE — 86235 NUCLEAR ANTIGEN ANTIBODY: CPT

## 2019-03-28 PROCEDURE — 86140 C-REACTIVE PROTEIN: CPT

## 2019-03-28 PROCEDURE — 86038 ANTINUCLEAR ANTIBODIES: CPT

## 2019-03-28 PROCEDURE — 99999 PR PBB SHADOW E&M-EST. PATIENT-LVL IV: CPT | Mod: PBBFAC,,, | Performed by: INTERNAL MEDICINE

## 2019-03-28 RX ORDER — HYDROXYCHLOROQUINE SULFATE 200 MG/1
400 TABLET, FILM COATED ORAL DAILY
Qty: 120 TABLET | Refills: 0 | Status: SHIPPED | OUTPATIENT
Start: 2019-03-28 | End: 2019-04-28 | Stop reason: SDUPTHER

## 2019-03-28 RX ORDER — ASPIRIN 325 MG
50000 TABLET, DELAYED RELEASE (ENTERIC COATED) ORAL
Qty: 30 CAPSULE | Refills: 0 | Status: SHIPPED | OUTPATIENT
Start: 2019-03-28 | End: 2021-02-17

## 2019-03-28 ASSESSMENT — ROUTINE ASSESSMENT OF PATIENT INDEX DATA (RAPID3)
TOTAL RAPID3 SCORE: 4.06
FATIGUE SCORE: 7
MDHAQ FUNCTION SCORE: .2
PAIN SCORE: 6
PSYCHOLOGICAL DISTRESS SCORE: 3.3
AM STIFFNESS SCORE: 1, YES
WHEN YOU AWAKENED IN THE MORNING OVER THE LAST WEEK, PLEASE INDICATE THE AMOUNT OF TIME IT TAKES UNTIL YOU ARE AS LIMBER AS YOU WILL BE FOR THE DAY: 20MIN
PATIENT GLOBAL ASSESSMENT SCORE: 5.5

## 2019-03-28 NOTE — PROGRESS NOTES
Subjective:       Patient ID: Manasa Hollins is a 43 y.o. female with Sjogren's syndrome.     Chief Complaint: No chief complaint on file.    44 yo lady w Sjogren's with + serology & marked hypergammaglobulinemia but with IgG2 deficiency and recurrent ear & sinus infections. She was prescribed Hizentra (immune globulin) SC and started it at the end of December, 2016.  She also has anemia, most likely of chronic disease. She has been seen by hematology who felt anemia was due to chronic inflammation with associated abnormal utilization of iron.      Returns for follow-up. Last seen on  8/13/18. Has not had any new parotid gland swelling. Has had some skin rashes on her abdomen & chest-- has had 3 episodes of single vesicle that grows and bursts. She has discussed this with Allergy.       Doing very well dry mouth wise  since she's increased salagen to 7.5 mg 4 x/ day. She has had parotid gland swelling in the past. No longer. She uses OTC drops for dry eyes. (She is followed by outside ophthalmologist for both Sjogren's & HCQ use (400 mg/d). Was told she does not have eye staining.)  Swallowing is ok. AM stiffness x 20 minutes. Denies joint pain; denies joint swelling, Raynaud's, tight skin, alopecia, oral ulcers, pericarditis, photosensitivity, skin rashes, miscarriages (but had one premature birth at 26.5 weeks) & thromboses.    Is continuing with non restorative sleep and fatigue. Is still using sleepy time tea extra + melatonin up to 5 mg & lavender candles. Has no problem going to sleep, but cannot stay asleep. Is exercising  and it's helping but cannot lose weight. She and her  work with a  3 x/week and she walks other days. She feels it is helpful. Anxiety and stress improved but still there.     Pertinent hx from previous visit:   40 yr old lady with chronic sinusitis with dry nose, mouth and & dry eyes (keratitis documented-photo displayed on 10/12/15 visit) initially sent to rule out  Sjogren's after she requested testing and was found to have a +MANISHA 1:250 sp with +SSA & +SSB. Found to have non tender parotid gland enlargement.  She was previously begun on pilocarpine 5 mg qid  Sxs remain the same--she minimizes them. She did not check with pulmonary as she was admitted in November with fever and bilateral otomastoiditis. She is now improved. Gets does get frequent dental cleaning            She reports no joint swelling. Associated symptoms include fatigue, trouble swallowing and headaches. Pertinent negatives include no dysuria, fever or myalgias.          Current Outpatient Medications   Medication Sig Dispense Refill    acetaminophen (TYLENOL ARTHRITIS ORAL) Take by mouth.      albuterol (PROAIR HFA) 90 mcg/actuation inhaler INHALE 2 PUFFS INTO THE LUNGS EVERY 4 HOURS AS NEEDED 8.5 g 11    albuterol (PROAIR HFA) 90 mcg/actuation inhaler INHALE 2 PUFFS INTO THE LUNGS EVERY 4 HOURS AS NEEDED 8.5 g 11    b complex vitamins tablet Take 1 tablet by mouth every morning.       CALCIUM CARBONATE/VITAMIN D3 (VITAMIN D-3 ORAL) Take 4,000 Units by mouth every morning.       calcium-magnesium-zinc 333-133-5 mg Tab Take 1 tablet by mouth every morning.       CARBOXYMETHYLCELLULOSE SODIUM (REFRESH TEARS OPHT) Apply 2 drops to eye as needed.       cholecalciferol, vitamin D3, (VITAMIN D3) 5,000 unit Tab Take 5,000 Units by mouth every morning.      hydroxychloroquine (PLAQUENIL) 200 mg tablet Take 2 tablets (400 mg total) by mouth once daily. 120 tablet 0    immun glob G,IgG,-pro-IgA 0-50 (HIZENTRA) 10 gram/50 mL (20 %) Soln Inject 12 g into the skin once a week. 240 mL 11    krill-om-3-dha-epa-phospho-ast (MEGARED OMEGA-3 KRILL OIL) 651-55-02-50 mg Cap Take by mouth.      levocetirizine (XYZAL) 5 MG tablet Take 1 tablet (5 mg total) by mouth every evening. 30 tablet 1    linaclotide (LINZESS) 145 mcg Cap capsule Take 1 capsule (145 mcg total) by mouth once daily. 90 capsule 3    omeprazole  (PRILOSEC) 40 MG capsule Take 1 capsule (40 mg total) by mouth every morning. 90 capsule 3    pilocarpine HCl (SALAGEN) 7.5 mg tablet Take 1 tablet (7.5 mg total) by mouth 4 (four) times daily. 120 tablet 11    PREVIDENT 0.2 % Soln USE AS DIRECTED  0    PREVIDENT 5000 PLUS 1.1 % Crea       saliva stimulant agents comb.3 (BIOTENE MOISTURIZING MOUTH) Spry by Mucous Membrane route as needed.       SALIVA SUBSTITUTION COMBO NO.9 (BIOTENE DRY MOUTH ORAL RINSE MM) by Mucous Membrane route as needed.       triamcinolone acetonide 0.1% (KENALOG) 0.1 % ointment Apply topically 2 (two) times daily. 80 g 3    UNABLE TO FIND Take 1 tablet by mouth every morning. Kavin red vitamins 500 mg      vitamin A 8000 UNIT capsule Take 8,000 Units by mouth every morning.        No current facility-administered medications for this visit.          Review of Systems   Constitutional: Positive for fatigue. Negative for diaphoresis, fever and unexpected weight change.   HENT: Positive for trouble swallowing. Negative for mouth sores, sore throat and tinnitus.         Dry mouth   Eyes: Negative.  Negative for redness and visual disturbance.        Dry eyes   Respiratory: Positive for cough (In AM; ) and shortness of breath (warm weather makes her pant; maybe allergic to oak trees). Negative for choking and chest tightness.    Cardiovascular: Positive for chest pain. Negative for palpitations and leg swelling.   Gastrointestinal: Positive for constipation (helped by linzess every other day & exercise. ). Negative for abdominal distention, abdominal pain, blood in stool, diarrhea, nausea and vomiting.        Heartburn   Endocrine: Positive for cold intolerance.   Genitourinary: Negative.  Negative for dysuria, frequency, genital sores, hematuria and menstrual problem.   Musculoskeletal: Positive for arthralgias (knees without swelling.). Negative for back pain, joint swelling, myalgias, neck pain and neck stiffness.   Skin: Negative for  color change and rash.   Allergic/Immunologic: Negative.    Neurological: Positive for numbness and headaches. Negative for dizziness, syncope, weakness and light-headedness.   Hematological: Negative.  Negative for adenopathy. Does not bruise/bleed easily.   Psychiatric/Behavioral: Positive for sleep disturbance. Negative for dysphoric mood. The patient is nervous/anxious.          Review of patient's allergies indicates:   Allergen Reactions    Sulfa dyne      Other reaction(s): CAUSES ASTHMA ATTACK         Past Surgical History:   Procedure Laterality Date    CAUTERIZATION OF TURBINATES Bilateral 2014    [facility]     SECTION      x4    ESOPHAGOGASTRODUODENOSCOPY (EGD) N/A 2016    Performed [facility] cT [facility]ida [facility] at [facility] ENDO (4TH FLR)    MYOMECTOMY      RECONSTRUCTION-NASAL N/A 2014    [facility]    RESECTION-TURBINATES (SMR) Bilateral 2017    Performed by Saw[facility]ardo [facility] at [facility] OR 2ND FLR    SINUS SURGERY FUNCTIONAL ENDOSCOPIC WITH NAVIGATION Bilateral 2017    [facility] Saywer [facility] at [facility] OR 2ND FLR    SINUS SURGERY FUNCTIONAL ENDOSCOPIC WITH NAVIGATION N/A 2014    [facility]    TUBAL LIGATION      done during myometomy surgery.    upper gi  2016         Family History   Problem Relation Age of Onset    Cancer Mother         Lung/Cervical    Cancer Maternal Grandmother         Breast/Cervical    Cancer Other         Breast     Asthma Child     Emphysema Neg Hx     Colon cancer Neg Hx     Stomach cancer Neg Hx     Esophageal cancer Neg Hx     Ulcerative colitis Neg Hx     Crohn's disease Neg Hx     Irritable bowel syndrome Neg Hx     Celiac disease Neg Hx    Mom & Dad both had sickle cell trait.  Mom developed depression.   Mom  with lung cancer (non smoker) & had cervical cancer and another cancer as well.    Social  "History     Tobacco Use    Smoking status: Never Smoker    Smokeless tobacco: Never Used   Substance Use Topics    Alcohol use: No     Alcohol/week: 0.0 oz    Drug use: No     Works as a  for [employer]  Graduated school for medical billing  Also wants to go to school for coding.  Objective:     /71   Pulse 90   Ht 5' 4" (1.626 m)   Wt 91.7 kg (202 lb 2.6 oz)   LMP 03/21/2019 (Exact Date)   BMI 34.70 kg/m²    Was 206 lb on 8/13/18  Was 205 lb 6.4 oz on 2/8/18  Was 208 lbs 14.4 oz on 3/28/17  Was 198 lbs 12.8 oz on 11/22/16.  Was 191 lbs 6.4 oz on 7/21/16.   Physical Exam   Vitals reviewed.  Constitutional: She is oriented to person, place, and time and well-developed, well-nourished, and in no distress. No distress.   HENT:   Head: Normocephalic and atraumatic.   Mouth/Throat: Oropharynx is clear and moist. No oropharyngeal exudate.   No facial rashes  Parotids minimally enlarged &, non tender  Lacrimals minimally enlarged  Decreased  moisture of oral mucosa   No oral ulcers  Teeth in good repair   Eyes: Conjunctivae and EOM are normal. Pupils are equal, round, and reactive to light. Right eye exhibits no discharge. Left eye exhibits no discharge. No scleral icterus.   Neck: Neck supple. No JVD present. No tracheal deviation present. No thyromegaly present.   Cardiovascular: Normal rate, regular rhythm, normal heart sounds and intact distal pulses.  Exam reveals no gallop and no friction rub.    No murmur heard.  Pulmonary/Chest: Breath sounds normal. No respiratory distress. She has no wheezes. She has no rales. She exhibits no tenderness.   Abdominal: Soft. Bowel sounds are normal. She exhibits no distension and no mass. There is no splenomegaly or hepatomegaly. There is no tenderness. There is no rebound and no guarding.   Lymphadenopathy:     She has no cervical adenopathy.        Right: No inguinal adenopathy present.        Left: No inguinal adenopathy present.   Neurological: " She is alert and oriented to person, place, and time. She has normal reflexes. No cranial nerve deficit. Gait normal.   Proximal and distal muscle strength 5/5 .   Skin: Skin is warm and dry. Rash noted. She is not diaphoretic.     Psychiatric: Mood, memory, affect and judgment normal.   Musculoskeletal: Normal range of motion. She exhibits no edema or tenderness.   Cspine FROM no tenderness  Tspine FROM no tenderness  Lspine FROM no tenderness.  TMJ: unremarkable  Shoulders: FROM; no synovitis;  Elbows: FROM; no synovitis; no tophi or nodules  Wrists: FROM; no synovitis;    MCPs: FROM; Mild enlargement of 1st MCP on left; no synovitis; no metacarpalgia;  ok;  PIPs: FROM; no synovitis; no tenderness of any joints.   DIPs: FROM; no synovitis;   HIPS: FROM  Knees: FROM; no crepitus; no synovitis; no instability;  Ankles: FROM: no synovitis   Toes: ok; no metatarsalgia             LABS:  3/13/19: Vit D 21;   18: CBC Hg 9.1 Ht 29.1; CMP TP 9.9; alb 3.1;    17: CMP TP 10.0; Alb. 3.0;  17: Hg 8.9; Ht 28.1  17: ; CRP 4.6; Hg 9.4; Ht 28.9; Na 133; TP 10.1; Alb. 2.6;   16: Hg 9.8; Ht 29.6; Na 135; Alb. 2.9;   16: ; CRP 3.9; Hg 8.2; Ht 25.2; CMP TP 9.4; Alb. 2.4; Ca 8.4; CPK 88; SPE elevated TP & gammaglobulins with oligoclonal bands.   16: ;   5/3/16: Hg 8.9; Ht 27.5; IgG1 3490 (1140); IgG2 56 (150);  16: ; CRP 6.7; Hg 9.1; Ht 28.7; CMP Alb. 2.8; TP 9.4; UA ok; U pr/cnne .37 (.20)  3/11/16: CMP Ca 8.2; Alb. 1.4  3/7/16: UA 3+ pr; 2+ OB; * RBCs 16 WBC, 9 HC  12/1/15: Hg 7.3; Ht 22.5; K+ 3.4; Ca 8.6;   8/18/15: UA ok;  8/12/15: LAC neg; aCLA neg; C3, C4 ok; dsDNA neg;       Imagin16: Bilateral knee x-rays: personally reviewed: very mild bilateral DJD.  16: CT chest: personally reviewed: unremarkable.  16: CXR: personally reviewed: prob. Ok; RLL opacity c/w scarring  10/12/15: CXR: personally reviewed with patient: OK  10/12/15:  "Bilateral Hands: personally reviewed with patient: ok  Assessment:   Sjogren's syndrome -- stable/improved    Sicca sxs with dry mouth with dental carries, dry eyes & dry mouth     Response to pilocarpine      But cough & mild SOB following--patient opts to continue    Bilateral parotid gland swelling    +MANISHA 1:2560; +SSA; +SSB: TP 10.1;     RF neg; LAC, aCLA neg (premature birth at 26.5 wks).     C3, C4, UA ok; elevated IgG 4489 (1600); IgA 495 (350);     + FH SLE (sister)    On  mg/d & pilocarpine 7.5 mg qid    Anemia:  NCNC--chronic; probably mostly chronic disease (according to ) with mild iron deficiency   Hg electrophoresis A2   Lowest Ht 18.1; highest 34.6   Mom & Dad had sickle cell trait   Some component of iron deficiency    retic ok; MMA ok;    G6PD ok; hapto not low    Vitamin D insufficiency    Non restorative sleep associated with fatigue & features of FMS    Bilateral knee pain/weakness--stable   Imaging with minimal bilateral OA    Recurrent ENT & possibly pulmonary infections associated with IgG2 deficiency   S/P bilateral otomastoiditis with fever hospitalized 11/15    Possibly parotidomegaly played a role.   Pulmonary issues:     S/P CAP 3/17/16:     Also with chronic cough & SOB     She's had "allergic cough" in past.     Salagen as a culprit ruled out     CT chest 5/2/16: unremarkable   Inadequate response to pneumovax   IgG2 deficiency on Hizentra     Reactive Airways/Asthma    Some response to advair    Obesity--improving      Plan:   Stay on  mg/d with eye exams.  Ok to continue pilocarpine 7.5 mg qid.  Cholecalciferol 50,000 IU twice weekly x 15 weeks.   Continue OTC for dry mouth and eyes prn  Continue aerobic exercise  Labs today.  RTC 6 months or prn.        "

## 2019-03-29 LAB
ANA SER QL IF: POSITIVE
ANA TITR SER IF: NORMAL {TITER}
ANTI-SSA ANTIBODY: 206.14 EU (ref 0–19.99)
ANTI-SSA INTERPRETATION: POSITIVE

## 2019-03-30 LAB — STFR SERPL-MCNC: 4.4 MG/L (ref 1.8–4.6)

## 2019-04-01 LAB
ANTI SM ANTIBODY: 3.62 EU (ref 0–19.99)
ANTI SM/RNP ANTIBODY: 1.98 EU (ref 0–19.99)
ANTI-SM INTERPRETATION: NEGATIVE
ANTI-SM/RNP INTERPRETATION: NEGATIVE
ANTI-SSA ANTIBODY: 206.14 EU (ref 0–19.99)
ANTI-SSA INTERPRETATION: POSITIVE
ANTI-SSB ANTIBODY: 214.8 EU (ref 0–19.99)
ANTI-SSB INTERPRETATION: POSITIVE
DSDNA AB SER-ACNC: ABNORMAL [IU]/ML

## 2019-04-10 ENCOUNTER — PATIENT MESSAGE (OUTPATIENT)
Dept: FAMILY MEDICINE | Facility: CLINIC | Age: 44
End: 2019-04-10

## 2019-04-10 DIAGNOSIS — M35.00 SJOGREN'S SYNDROME, WITH UNSPECIFIED ORGAN INVOLVEMENT: ICD-10-CM

## 2019-04-10 DIAGNOSIS — H04.123 DRY EYE SYNDROME OF BOTH EYES: Primary | ICD-10-CM

## 2019-04-10 NOTE — TELEPHONE ENCOUNTER
Patient informed regarding referral placement. Advised someone from referral department will call her to schedule.

## 2019-04-10 NOTE — TELEPHONE ENCOUNTER
LOV 12/10/18 CADY Mcginnis.    Patient requesting referral for opthalmology Dr.Glen Herman. Patient states she has sjogren's syndrome, and takes Plaquenil. Has not seen ophthalmologist in 2 yrs. OTC solutions do not work,makes vision blurred and extremely sensitive to light.

## 2019-04-22 ENCOUNTER — OFFICE VISIT (OUTPATIENT)
Dept: OPTOMETRY | Facility: CLINIC | Age: 44
End: 2019-04-22
Payer: COMMERCIAL

## 2019-04-22 DIAGNOSIS — Z79.899 LONG-TERM USE OF PLAQUENIL: ICD-10-CM

## 2019-04-22 DIAGNOSIS — M35.00 SJOGREN'S SYNDROME, WITH UNSPECIFIED ORGAN INVOLVEMENT: Primary | ICD-10-CM

## 2019-04-22 DIAGNOSIS — H04.123 DRY EYE SYNDROME, BILATERAL: ICD-10-CM

## 2019-04-22 PROCEDURE — 92004 COMPRE OPH EXAM NEW PT 1/>: CPT | Mod: S$GLB,,, | Performed by: OPTOMETRIST

## 2019-04-22 PROCEDURE — 99999 PR PBB SHADOW E&M-EST. PATIENT-LVL I: CPT | Mod: PBBFAC,,, | Performed by: OPTOMETRIST

## 2019-04-22 PROCEDURE — 99999 PR PBB SHADOW E&M-EST. PATIENT-LVL I: ICD-10-PCS | Mod: PBBFAC,,, | Performed by: OPTOMETRIST

## 2019-04-22 PROCEDURE — 92004 PR EYE EXAM, NEW PATIENT,COMPREHESV: ICD-10-PCS | Mod: S$GLB,,, | Performed by: OPTOMETRIST

## 2019-04-22 PROCEDURE — 92134 POSTERIOR SEGMENT OCT RETINA (OCULAR COHERENCE TOMOGRAPHY)-BOTH EYES: ICD-10-PCS | Mod: S$GLB,,, | Performed by: OPTOMETRIST

## 2019-04-22 PROCEDURE — 92134 CPTRZ OPH DX IMG PST SGM RTA: CPT | Mod: S$GLB,,, | Performed by: OPTOMETRIST

## 2019-04-22 NOTE — PROGRESS NOTES
Subjective:       Patient ID: Manasa Hollins is a 43 y.o. female      Chief Complaint   Patient presents with    Dry Eye    Plaquenil Eye Exam     HCQ x 2015. 400 mg PO QD     History of Present Illness  HPI     Plaquenil Eye Exam      Additional comments: HCQ x 2015. 400 mg PO QD              Comments     Dls: 2017     44 y/o female presents today for plaquenil ck.   Pt c/o photophobia ou x 2-3 months dry eyes ou getting worse.   Pt c/o problems focusing at distance and near ou. Pt wears single vision   glasses.    No tearing  + itching  No burning  No pain  No ha's  No floaters  No flashes    Eye meds  Refresh gtts ou 10+ x a day   Refresh gel ou 10+ x a day   Amara ou qhs           Last edited by Chiquita Roberson, OD on 4/22/2019  4:18 PM. (History)      Assessment/Plan:     1. Sjogren's syndrome, with unspecified organ involvement  2. Long-term use of Plaquenil  No evidence of plaquenil maculopathy on exam, can continue with plaquenil therapy. OCT RNFL WNL OU today. Pt to return for HVF 10-2. If all results normal, return in 1 years for DFE, HVF 10-2, and OCT macula.     - Posterior Segment OCT Retina-Both eyes  - Richter Visual Field - OU - Extended - Both Eyes; Future    3. Dry eye syndrome, bilateral  Diffuse SPK OU. Pt using OTC AT with no relief. Start  Xiidra BID OU. Discussed with pt that it may take a few weeks before he/she experiences relief from drops. Side effects may include eye irritation or discomfort, unpleasant taste, and transient blurred vision.    Can supplement with AT throughout day as needed. Wait 10 minutes between drops.    - lifitegrast (XIIDRA) 5 % Dpet; Apply 1 drop to eye 2 (two) times daily.  Dispense: 60 each; Refill: 11    RCT for HVF 10-2, then yearly if normal.  Follow up in about 1 year (around 4/22/2020) for Plaquenil check, HVF 10-2, OCT macula.

## 2019-04-22 NOTE — LETTER
April 22, 2019      Tomi Myers, FNP-C  [address]  [place] [address]           [facility]  [address]  [place] [address]  Phone: [phone]  Fax: [phone]          Patient: Manasa Hollins   MR Number: [id number]   YOB: 1975   Date of Visit: 4/22/2019       Dear Tomi Myers:    Thank you for referring Manasa Hollins to me for evaluation. Attached you will find relevant portions of my assessment and plan of care.    If you have questions, please do not hesitate to call me. I look forward to following Manasa Hollins along with you.    Sincerely,    Chiquita Roberson, OD    Enclosure  CC:  No Recipients    If you would like to receive this communication electronically, please contact [email or web address] or [phone] to request more information on [employer] access.    For providers and/or their staff who would like to refer a patient to [facility], please contact us through our one-stop-shop provider referral line, [facility] , at [phone].    If you feel you have received this communication in error or would no longer like to receive these types of communications, please e-mail [email or web address]

## 2019-04-28 DIAGNOSIS — K11.1 PAROTID GLAND ENLARGEMENT: ICD-10-CM

## 2019-04-28 RX ORDER — HYDROXYCHLOROQUINE SULFATE 200 MG/1
400 TABLET, FILM COATED ORAL DAILY
Qty: 120 TABLET | Refills: 0 | Status: SHIPPED | OUTPATIENT
Start: 2019-04-28 | End: 2019-06-21 | Stop reason: SDUPTHER

## 2019-05-03 ENCOUNTER — CLINICAL SUPPORT (OUTPATIENT)
Dept: OPHTHALMOLOGY | Facility: CLINIC | Age: 44
End: 2019-05-03
Payer: COMMERCIAL

## 2019-05-03 DIAGNOSIS — M35.00 SJOGREN'S SYNDROME, WITH UNSPECIFIED ORGAN INVOLVEMENT: ICD-10-CM

## 2019-05-03 DIAGNOSIS — Z79.899 LONG-TERM USE OF PLAQUENIL: ICD-10-CM

## 2019-05-03 PROCEDURE — 92083 EXTENDED VISUAL FIELD XM: CPT | Mod: S$GLB,,, | Performed by: OPTOMETRIST

## 2019-05-03 PROCEDURE — 92083 HUMPHREY VISUAL FIELD - OU - BOTH EYES: ICD-10-PCS | Mod: S$GLB,,, | Performed by: OPTOMETRIST

## 2019-06-17 DIAGNOSIS — Z12.11 SPECIAL SCREENING FOR MALIGNANT NEOPLASMS, COLON: Primary | ICD-10-CM

## 2019-06-17 RX ORDER — POLYETHYLENE GLYCOL 3350, SODIUM SULFATE ANHYDROUS, SODIUM BICARBONATE, SODIUM CHLORIDE, POTASSIUM CHLORIDE 236; 22.74; 6.74; 5.86; 2.97 G/4L; G/4L; G/4L; G/4L; G/4L
4 POWDER, FOR SOLUTION ORAL ONCE
Qty: 4000 ML | Refills: 0 | Status: SHIPPED | OUTPATIENT
Start: 2019-06-17 | End: 2019-06-17

## 2019-06-19 ENCOUNTER — HOSPITAL ENCOUNTER (OUTPATIENT)
Facility: HOSPITAL | Age: 44
Discharge: HOME | End: 2019-06-19
Attending: INTERNAL MEDICINE | Admitting: INTERNAL MEDICINE
Payer: COMMERCIAL

## 2019-06-19 ENCOUNTER — ANESTHESIA EVENT (OUTPATIENT)
Dept: ENDOSCOPY | Facility: HOSPITAL | Age: 44
End: 2019-06-19
Payer: COMMERCIAL

## 2019-06-19 ENCOUNTER — ANESTHESIA (OUTPATIENT)
Dept: ENDOSCOPY | Facility: HOSPITAL | Age: 44
End: 2019-06-19
Payer: COMMERCIAL

## 2019-06-19 VITALS
SYSTOLIC BLOOD PRESSURE: 112 MMHG | TEMPERATURE: 98 F | DIASTOLIC BLOOD PRESSURE: 62 MMHG | HEART RATE: 63 BPM | OXYGEN SATURATION: 99 % | RESPIRATION RATE: 20 BRPM | HEIGHT: 64 IN | WEIGHT: 205 LBS | BODY MASS INDEX: 35 KG/M2

## 2019-06-19 DIAGNOSIS — Z12.11 SCREENING FOR COLON CANCER: ICD-10-CM

## 2019-06-19 DIAGNOSIS — Z12.11 COLON CANCER SCREENING: Primary | ICD-10-CM

## 2019-06-19 PROCEDURE — 37000009 HC ANESTHESIA EA ADD 15 MINS: Performed by: INTERNAL MEDICINE

## 2019-06-19 PROCEDURE — 63600175 PHARM REV CODE 636 W HCPCS: Performed by: NURSE ANESTHETIST, CERTIFIED REGISTERED

## 2019-06-19 PROCEDURE — G0121 COLON CA SCRN NOT HI RSK IND: HCPCS | Mod: ,,, | Performed by: INTERNAL MEDICINE

## 2019-06-19 PROCEDURE — 37000008 HC ANESTHESIA 1ST 15 MINUTES: Performed by: INTERNAL MEDICINE

## 2019-06-19 PROCEDURE — G0121 COLON CA SCRN NOT HI RSK IND: HCPCS | Performed by: INTERNAL MEDICINE

## 2019-06-19 PROCEDURE — G0121 COLON CA SCRN NOT HI RSK IND: ICD-10-PCS | Mod: ,,, | Performed by: INTERNAL MEDICINE

## 2019-06-19 PROCEDURE — E9220 PRA ENDO ANESTHESIA: HCPCS | Mod: ,,, | Performed by: NURSE ANESTHETIST, CERTIFIED REGISTERED

## 2019-06-19 PROCEDURE — 25000003 PHARM REV CODE 250: Performed by: INTERNAL MEDICINE

## 2019-06-19 PROCEDURE — E9220 PRA ENDO ANESTHESIA: ICD-10-PCS | Mod: ,,, | Performed by: NURSE ANESTHETIST, CERTIFIED REGISTERED

## 2019-06-19 RX ORDER — SODIUM CHLORIDE 9 MG/ML
INJECTION, SOLUTION INTRAVENOUS CONTINUOUS
Status: DISCONTINUED | OUTPATIENT
Start: 2019-06-19 | End: 2019-06-19 | Stop reason: HOSPADM

## 2019-06-19 RX ORDER — PROPOFOL 10 MG/ML
VIAL (ML) INTRAVENOUS
Status: DISCONTINUED | OUTPATIENT
Start: 2019-06-19 | End: 2019-06-19

## 2019-06-19 RX ORDER — LIDOCAINE HCL/PF 100 MG/5ML
SYRINGE (ML) INTRAVENOUS
Status: DISCONTINUED | OUTPATIENT
Start: 2019-06-19 | End: 2019-06-19

## 2019-06-19 RX ADMIN — PROPOFOL 100 MG: 10 INJECTION, EMULSION INTRAVENOUS at 11:06

## 2019-06-19 RX ADMIN — PROPOFOL 30 MG: 10 INJECTION, EMULSION INTRAVENOUS at 11:06

## 2019-06-19 RX ADMIN — LIDOCAINE HYDROCHLORIDE 50 MG: 20 INJECTION, SOLUTION INTRAVENOUS at 11:06

## 2019-06-19 RX ADMIN — SODIUM CHLORIDE: 0.9 INJECTION, SOLUTION INTRAVENOUS at 10:06

## 2019-06-19 RX ADMIN — PROPOFOL 50 MG: 10 INJECTION, EMULSION INTRAVENOUS at 11:06

## 2019-06-19 NOTE — ANESTHESIA POSTPROCEDURE EVALUATION
Anesthesia Post Evaluation    Patient: Manasa Hollins    Procedure(s) Performed: Procedure(s) (LRB):  COLONOSCOPY (N/A)    Final Anesthesia Type: general  Patient location during evaluation: GI PACU  Patient participation: Yes- Able to Participate  Level of consciousness: awake and alert  Post-procedure vital signs: reviewed and stable  Pain management: adequate  Airway patency: patent  PONV status at discharge: No PONV  Anesthetic complications: no      Cardiovascular status: hemodynamically stable  Respiratory status: unassisted, spontaneous ventilation and room air  Hydration status: euvolemic  Follow-up not needed.          Vitals Value Taken Time   /62 6/19/2019 11:53 AM   Temp 36.6 °C (97.9 °F) 6/19/2019 11:23 AM   Pulse 63 6/19/2019 11:53 AM   Resp 20 6/19/2019 11:53 AM   SpO2 99 % 6/19/2019 11:53 AM         Event Time     Out of Recovery 11:56:01          Pain/Yoanna Score: Yoanna Score: 10 (6/19/2019 11:53 AM)

## 2019-06-19 NOTE — H&P
Short Stay Endoscopy History and Physical    PCP - Damon Shields MD    Procedure - Colonoscopy  Sedation: GA  ASA - per anesthesia  Mallampati - per anesthesia  History of Anesthesia problems - no  Family history Anesthesia problems -  no     HPI:  This is a 43 y.o. female here for evaluation of : Screening for CRC    Reflux - no  Dysphagia - no  Abdominal pain - no  Diarrhea - no    ROS:  Constitutional: No fevers, chills, No weight loss  ENT: No allergies  CV: No chest pain  Pulm: No cough, No shortness of breath  Ophtho: No vision changes  GI: see HPI  Medical History:  has a past medical history of Asthma, Eye injury (at age 19), General anesthetics causing adverse effect in therapeutic use, GERD (gastroesophageal reflux disease), Iron deficiency anemia, and Sjogren's disease.    Surgical History:  has a past surgical history that includes  section; Myomectomy; Tubal ligation; and upper gi (2016).    Family History: family history includes Asthma in her child; Cancer in her maternal grandmother, mother, and other; Glaucoma in her father; No Known Problems in her brother, maternal aunt, maternal grandfather, maternal uncle, paternal aunt, paternal grandfather, paternal grandmother, paternal uncle, and sister.. Otherwise no colon cancer, inflammatory bowel disease, or GI malignancies.    Social History:  reports that she has never smoked. She has never used smokeless tobacco. She reports that she does not drink alcohol or use drugs.    Review of patient's allergies indicates:   Allergen Reactions    Sulfa dyne Shortness Of Breath     Other reaction(s): CAUSES ASTHMA ATTACK, Is not sure of the name of the medication that she is allergic to       Medications:   Medications Prior to Admission   Medication Sig Dispense Refill Last Dose    acetaminophen (TYLENOL ARTHRITIS ORAL) Take by mouth.   Taking    albuterol (PROAIR HFA) 90 mcg/actuation inhaler INHALE 2 PUFFS INTO THE LUNGS EVERY 4 HOURS AS  NEEDED 8.5 g 11 Taking    albuterol (PROAIR HFA) 90 mcg/actuation inhaler INHALE 2 PUFFS INTO THE LUNGS EVERY 4 HOURS AS NEEDED 8.5 g 11 Taking    b complex vitamins tablet Take 1 tablet by mouth every morning.    Taking    CALCIUM CARBONATE/VITAMIN D3 (VITAMIN D-3 ORAL) Take 4,000 Units by mouth every morning.    Taking    calcium-magnesium-zinc 333-133-5 mg Tab Take 1 tablet by mouth every morning.    Taking    CARBOXYMETHYLCELLULOSE SODIUM (REFRESH TEARS OPHT) Apply 2 drops to eye as needed.    Taking    cholecalciferol, vitamin D3, (DECARA) 50,000 unit capsule Take 1 capsule (50,000 Units total) by mouth twice a week. 30 capsule 0 Taking    hydroxychloroquine (PLAQUENIL) 200 mg tablet TAKE 2 TABLETS (400 MG TOTAL) BY MOUTH ONCE DAILY. 120 tablet 0     immun glob G,IgG,-pro-IgA 0-50 (HIZENTRA) 10 gram/50 mL (20 %) Soln Inject 12 g into the skin once a week. 240 mL 11 Taking    krill-om-3-dha-epa-phospho-ast (MEGARED OMEGA-3 KRILL OIL) 736-15-10-50 mg Cap Take by mouth.   Taking    levocetirizine (XYZAL) 5 MG tablet Take 1 tablet (5 mg total) by mouth every evening. 30 tablet 1 Taking    lifitegrast (XIIDRA) 5 % Dpet Apply 1 drop to eye 2 (two) times daily. 60 each 11     linaclotide (LINZESS) 145 mcg Cap capsule Take 1 capsule (145 mcg total) by mouth once daily. 90 capsule 3 Taking    omeprazole (PRILOSEC) 40 MG capsule Take 1 capsule (40 mg total) by mouth every morning. 90 capsule 3 Taking    pilocarpine HCl (SALAGEN) 7.5 mg tablet Take 1 tablet (7.5 mg total) by mouth 4 (four) times daily. 120 tablet 11 Taking    PREVIDENT 0.2 % Soln USE AS DIRECTED  0 Taking    PREVIDENT 5000 PLUS 1.1 % Crea    Taking    saliva stimulant agents comb.3 (BIOTENE MOISTURIZING MOUTH) Spry by Mucous Membrane route as needed.    Taking    SALIVA SUBSTITUTION COMBO NO.9 (BIOTENE DRY MOUTH ORAL RINSE MM) by Mucous Membrane route as needed.    Taking    triamcinolone acetonide 0.1% (KENALOG) 0.1 % ointment Apply  topically 2 (two) times daily. 80 g 3 Taking    UNABLE TO FIND Take 1 tablet by mouth every morning. Kavin red vitamins 500 mg   Taking    vitamin A 8000 UNIT capsule Take 8,000 Units by mouth every morning.    Taking       Objective Findings:    Vital Signs: Per nursing notes.    Physical Exam:  General Appearance: Well appearing in no acute distress  Head:   Normocephalic, without obvious abnormality  Eyes:    No scleral icterus  Airway: Open  Neck: No restriction in mobility  Lungs: CTA bilaterally in anterior and posterior fields, no wheezes, no crackles.  Heart:  Regular rate and rhythm, S1, S2 normal, no murmurs heard  Abdomen: Soft, non tender, non distended      Labs:  Lab Results   Component Value Date    WBC 6.00 03/28/2019    HGB 9.5 (L) 03/28/2019    HCT 30.2 (L) 03/28/2019     03/28/2019    CHOL 116 (L) 11/22/2017    TRIG 68 11/22/2017    HDL 46 11/22/2017    ALT 16 03/28/2019    AST 23 03/28/2019     (L) 03/28/2019    K 3.7 03/28/2019     03/28/2019    CREATININE 0.8 03/28/2019    BUN 8 03/28/2019    CO2 23 03/28/2019    TSH 1.081 11/22/2017    INR 1.0 05/29/2014         I have explained the risks and benefits of endoscopy procedures to the patient including but not limited to bleeding, perforation, infection, and death.    Thank you so much for allowing me to participate in the care of Manasa Barragan MD

## 2019-06-19 NOTE — DISCHARGE INSTRUCTIONS
Colonoscopy     A camera attached to a flexible tube with a viewing lens is used to take video pictures.     Colonoscopy is a test to view the inside of your lower digestive tract (colon and rectum). Sometimes it can show the last part of the small intestine (ileum). During the test, small pieces of tissue may be removed for testing. This is called a biopsy. Small growths, such as polyps, may also be removed.   Why is colonoscopy done?  The test is done to help look for colon cancer. And it can help find the source of abdominal pain, bleeding, and changes in bowel habits. It may be needed once a year, depending on factors such as your:  · Age  · Health history  · Family health history  · Symptoms  · Results from any prior colonoscopy  Risks and possible complications  These include:  · Bleeding               · A puncture or tear in the colon   · Risks of anesthesia  · A cancer lesion not being seen  Getting ready   To prepare for the test:  · Talk with your healthcare provider about the risks of the test (see below). Also ask your healthcare provider about alternatives to the test.  · Tell your healthcare provider about any medicines you take. Also tell him or her about any health conditions you may have.  · Make sure your rectum and colon are empty for the test. Follow the diet and bowel prep instructions exactly. If you dont, the test may need to be rescheduled.  · Plan for a friend or family member to drive you home after the test.     Colonoscopy provides an inside view of the entire colon.     You may discuss the results with your doctor right away or at a future visit.  During the test   The test is usually done in the hospital on an outpatient basis. This means you go home the same day. The procedure takes about 30 minutes. During that time:  · You are given relaxing (sedating) medicine through an IV line. You may be drowsy, or fully asleep.  · The healthcare provider will first give you a physical exam to  check for anal and rectal problems.  · Then the anus is lubricated and the scope inserted.  · If you are awake, you may have a feeling similar to needing to have a bowel movement. You may also feel pressure as air is pumped into the colon. Its OK to pass gas during the procedure.  · Biopsy, polyp removal, or other treatments may be done during the test.  After the test   You may have gas right after the test. It can help to try to pass it to help prevent later bloating. Your healthcare provider may discuss the results with you right away. Or you may need to schedule a follow-up visit to talk about the results. After the test, you can go back to your normal eating and other activities. You may be tired from the sedation and need to rest for a few hours.  Date Last Reviewed: 11/1/2016 © 2000-2017 [employer]. [address] [place] [address], [place] [address]. All rights reserved. This information is not intended as a substitute for professional medical care. Always follow your healthcare professional's instructions.

## 2019-06-19 NOTE — [employer] PATIENT INSTRUCTIONS
Discharge Summary/Instructions after an Endoscopic Procedure  Patient Name: Manasa Hollins  Patient MRN: [id number]  Patient YOB: 1975 Wednesday, June 19, 2019  Karri Barragan MD  RESTRICTIONS:  During your procedure today, you received medications for sedation.  These   medications may affect your judgment, balance and coordination.  Therefore,   for 24 hours, you have the following restrictions:   - DO NOT drive a car, operate machinery, make legal/financial decisions,   sign important papers or drink alcohol.    ACTIVITY:  Today: no heavy lifting, straining or running due to procedural   sedation/anesthesia.  The following day: return to full activity including work.  DIET:  Eat and drink normally unless instructed otherwise.     TREATMENT FOR COMMON SIDE EFFECTS:  - Mild abdominal pain, nausea, belching, bloating or excessive gas:  rest,   eat lightly and use a heating pad.  - Sore Throat: treat with throat lozenges and/or gargle with warm salt   water.  - Because air was used during the procedure, expelling large amounts of air   from your rectum or belching is normal.  - If a bowel prep was taken, you may not have a bowel movement for 1-3 days.    This is normal.  SYMPTOMS TO WATCH FOR AND REPORT TO YOUR PHYSICIAN:  1. Abdominal pain or bloating, other than gas cramps.  2. Chest pain.  3. Back pain.  4. Signs of infection such as: chills or fever occurring within 24 hours   after the procedure.  5. Rectal bleeding, which would show as bright red, maroon, or black stools.   (A tablespoon of blood from the rectum is not serious, especially if   hemorrhoids are present.)  6. Vomiting.  7. Weakness or dizziness.  GO DIRECTLY TO THE NEAREST EMERGENCY ROOM IF YOU HAVE ANY OF THE FOLLOWING:      Difficulty breathing              Chills and/or fever over 101 F   Persistent vomiting and/or vomiting blood   Severe abdominal pain   Severe chest pain   Black, tarry stools   Bleeding- more than one  tablespoon   Any other symptom or condition that you feel may need urgent attention  Your doctor recommends these additional instructions:  If any biopsies were taken, your doctors clinic will contact you in 1 to 2   weeks with any results.  - Patient has a contact number available for emergencies.  The signs and   symptoms of potential delayed complications were discussed with the   patient.  Return to normal activities tomorrow.  Written discharge   instructions were provided to the patient.   - Discharge patient to home.   - Resume previous diet.   - Continue present medications.   - Repeat colonoscopy in 10 years for screening purposes.   For questions, problems or results please call your physician - Karri Barragan MD at Work:  [phone].  [facility], EMERGENCY ROOM PHONE NUMBER: [phone]  IF A COMPLICATION OR EMERGENCY SITUATION ARISES AND YOU ARE UNABLE TO REACH   YOUR PHYSICIAN - GO DIRECTLY TO THE EMERGENCY ROOM.  Karri Barragan MD  6/19/2019 11:21:25 AM  This report has been verified and signed electronically.  [employer]

## 2019-06-19 NOTE — ANESTHESIA RELEASE NOTE
"Anesthesia Release from PACU Note    Patient: Manasa Hollins    Procedure(s) Performed: Procedure(s) (LRB):  COLONOSCOPY (N/A)    Anesthesia type: general    Post pain: Adequate analgesia    Post assessment: no apparent anesthetic complications and tolerated procedure well    Last Vitals:   Visit Vitals  /62 (BP Location: Left arm, Patient Position: Lying)   Pulse 63   Temp 36.6 °C (97.9 °F) (Temporal)   Resp 20   Ht 5' 4" (1.626 m)   Wt 93 kg (205 lb)   SpO2 99%   Breastfeeding? No   BMI 35.19 kg/m²       Post vital signs: stable    Level of consciousness: awake and alert     Nausea/Vomiting: no nausea/no vomiting    Complications: none    Airway Patency: patent    Respiratory: unassisted, spontaneous ventilation, room air    Cardiovascular: stable and blood pressure at baseline    Hydration: euvolemic  "

## 2019-06-19 NOTE — TRANSFER OF CARE
"Anesthesia Transfer of Care Note    Patient: Manasa Hollins    Procedure(s) Performed: Procedure(s) (LRB):  COLONOSCOPY (N/A)    Patient location: GI    Anesthesia Type: general    Transport from OR: Transported from OR on room air with adequate spontaneous ventilation    Post pain: adequate analgesia    Post assessment: no apparent anesthetic complications and tolerated procedure well    Post vital signs: stable    Level of consciousness: awake, alert and oriented    Nausea/Vomiting: no nausea/vomiting    Complications: none    Transfer of care protocol was followed      Last vitals:   Visit Vitals  BP (!) 116/57 (BP Location: Left arm, Patient Position: Lying)   Pulse 72   Temp 36.9 °C (98.4 °F) (Temporal)   Resp 17   Ht 5' 4" (1.626 m)   Wt 93 kg (205 lb)   SpO2 100%   Breastfeeding? No   BMI 35.19 kg/m²     "

## 2019-06-19 NOTE — ANESTHESIA PREPROCEDURE EVALUATION
06/19/2019  Manasa Hollins is a 43 y.o., female.    Anesthesia Evaluation    I have reviewed the Patient Summary Reports.     I have reviewed the Medications.     Review of Systems  Anesthesia Hx:  Denies Hx of Anesthetic complications  Neg history of prior surgery. Denies Family Hx of Anesthesia complications.   Denies Personal Hx of Anesthesia complications.   Social:  Non-Smoker    Hematology/Oncology:  Hematology Normal   Oncology Normal     EENT/Dental:EENT/Dental Normal   Cardiovascular:  Cardiovascular Normal Exercise tolerance: good     Pulmonary:   Asthma moderate    Renal/:  Renal/ Normal     Hepatic/GI:   Bowel Prep. GERD, well controlled    Musculoskeletal:  Musculoskeletal Normal    Neurological:  Neurology Normal    Endocrine:  Endocrine Normal    Dermatological:  Skin Normal    Psych:  Psychiatric Normal           Physical Exam  General:  Well nourished    Airway/Jaw/Neck:  Airway Findings: Mouth Opening: Normal Tongue: Normal  General Airway Assessment: Adult  Mallampati: II  Improves to I with phonation.  TM Distance: Normal, at least 6 cm     Eyes/Ears/Nose:  EYES/EARS/NOSE FINDINGS: Normal   Dental:  Dental Findings: In tact   Chest/Lungs:  Chest/Lungs Findings: Clear to auscultation, Normal Respiratory Rate     Heart/Vascular:  Heart Findings: Rate: Normal  Rhythm: Regular Rhythm  Sounds: Normal  Heart murmur: negative       Mental Status:  Mental Status Findings:  Cooperative, Alert and Oriented         Anesthesia Plan  Type of Anesthesia, risks & benefits discussed:  Anesthesia Type:  general  Patient's Preference: General  Intra-op Monitoring Plan: standard ASA monitors  Intra-op Monitoring Plan Comments:   Post Op Pain Control Plan:   Post Op Pain Control Plan Comments:   Induction:   IV  Beta Blocker:  Patient is not currently on a Beta-Blocker (No further documentation  required).       Informed Consent: Patient understands risks and agrees with Anesthesia plan.  Questions answered. Anesthesia consent signed with patient.  ASA Score: 2     Day of Surgery Review of History & Physical:    H&P update referred to the surgeon.         Ready For Surgery From Anesthesia Perspective.

## 2019-06-21 DIAGNOSIS — E55.9 VITAMIN D INSUFFICIENCY: ICD-10-CM

## 2019-06-21 DIAGNOSIS — K11.1 PAROTID GLAND ENLARGEMENT: ICD-10-CM

## 2019-06-21 RX ORDER — HYDROXYCHLOROQUINE SULFATE 200 MG/1
400 TABLET, FILM COATED ORAL DAILY
Qty: 180 TABLET | Refills: 2 | Status: SHIPPED | OUTPATIENT
Start: 2019-06-21 | End: 2020-01-14 | Stop reason: SDUPTHER

## 2019-06-21 RX ORDER — ASPIRIN 325 MG
50000 TABLET, DELAYED RELEASE (ENTERIC COATED) ORAL
Qty: 30 CAPSULE | Refills: 0 | OUTPATIENT
Start: 2019-06-24

## 2019-06-26 ENCOUNTER — TELEPHONE (OUTPATIENT)
Dept: ENDOSCOPY | Facility: HOSPITAL | Age: 44
End: 2019-06-26

## 2019-06-27 ENCOUNTER — PATIENT MESSAGE (OUTPATIENT)
Dept: RHEUMATOLOGY | Facility: CLINIC | Age: 44
End: 2019-06-27

## 2019-07-09 ENCOUNTER — PATIENT MESSAGE (OUTPATIENT)
Dept: FAMILY MEDICINE | Facility: CLINIC | Age: 44
End: 2019-07-09

## 2019-07-11 ENCOUNTER — OFFICE VISIT (OUTPATIENT)
Dept: FAMILY MEDICINE | Facility: CLINIC | Age: 44
End: 2019-07-11
Payer: COMMERCIAL

## 2019-07-11 VITALS
HEART RATE: 93 BPM | HEIGHT: 64 IN | SYSTOLIC BLOOD PRESSURE: 110 MMHG | TEMPERATURE: 99 F | DIASTOLIC BLOOD PRESSURE: 72 MMHG | OXYGEN SATURATION: 95 % | BODY MASS INDEX: 33.12 KG/M2 | WEIGHT: 194 LBS

## 2019-07-11 DIAGNOSIS — D57.3 SICKLE CELL TRAIT: ICD-10-CM

## 2019-07-11 DIAGNOSIS — R42 DIZZINESS: Primary | ICD-10-CM

## 2019-07-11 DIAGNOSIS — R09.82 POST-NASAL DRIP: ICD-10-CM

## 2019-07-11 PROCEDURE — 99214 OFFICE O/P EST MOD 30 MIN: CPT | Mod: S$GLB,,, | Performed by: NURSE PRACTITIONER

## 2019-07-11 PROCEDURE — 99214 PR OFFICE/OUTPT VISIT, EST, LEVL IV, 30-39 MIN: ICD-10-PCS | Mod: S$GLB,,, | Performed by: NURSE PRACTITIONER

## 2019-07-11 PROCEDURE — 99999 PR PBB SHADOW E&M-EST. PATIENT-LVL V: ICD-10-PCS | Mod: PBBFAC,,, | Performed by: NURSE PRACTITIONER

## 2019-07-11 PROCEDURE — 3008F BODY MASS INDEX DOCD: CPT | Mod: CPTII,S$GLB,, | Performed by: NURSE PRACTITIONER

## 2019-07-11 PROCEDURE — 3008F PR BODY MASS INDEX (BMI) DOCUMENTED: ICD-10-PCS | Mod: CPTII,S$GLB,, | Performed by: NURSE PRACTITIONER

## 2019-07-11 PROCEDURE — 99999 PR PBB SHADOW E&M-EST. PATIENT-LVL V: CPT | Mod: PBBFAC,,, | Performed by: NURSE PRACTITIONER

## 2019-07-11 RX ORDER — AZELASTINE 1 MG/ML
1 SPRAY, METERED NASAL 2 TIMES DAILY
Qty: 30 ML | Refills: 0 | Status: SHIPPED | OUTPATIENT
Start: 2019-07-11 | End: 2019-08-02 | Stop reason: SDUPTHER

## 2019-07-11 RX ORDER — LEVOCETIRIZINE DIHYDROCHLORIDE 5 MG/1
5 TABLET, FILM COATED ORAL NIGHTLY
Qty: 30 TABLET | Refills: 0 | Status: SHIPPED | OUTPATIENT
Start: 2019-07-11 | End: 2022-11-22
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
## 2019-07-30 ENCOUNTER — TELEPHONE (OUTPATIENT)
Dept: ALLERGY | Facility: CLINIC | Age: 44
End: 2019-07-30

## 2019-07-30 NOTE — TELEPHONE ENCOUNTER
Received a fax from [employer].    Hizentra 10 grams/50 ml vial has been approved    PA number [id number]    Coverage is for 53.000NDC Units effective from 8/8/19-8/6/2020    [employer] [phone]    Letter sent to scanning.

## 2019-08-02 RX ORDER — AZELASTINE 1 MG/ML
1 SPRAY, METERED NASAL 2 TIMES DAILY
Qty: 30 ML | Refills: 0 | Status: SHIPPED | OUTPATIENT
Start: 2019-08-02 | End: 2021-02-03 | Stop reason: SDUPTHER

## 2019-08-06 ENCOUNTER — TELEPHONE (OUTPATIENT)
Dept: ALLERGY | Facility: CLINIC | Age: 44
End: 2019-08-06

## 2019-08-06 NOTE — TELEPHONE ENCOUNTER
----- Message from Brandy Chowdhury sent at 8/6/2019 11:00 AM CDT -----  Contact: [facility]   [phone] opt #2 opt#2-please call [facility] pharmacy on above patient need to get a refill on patient medication said they faxed over a request but have not heard back from the office waiting on a call back thanks

## 2019-08-06 NOTE — TELEPHONE ENCOUNTER
Called and left a message for the patient tot give us a call back in regards to scheduling her requested appointment.

## 2019-08-14 ENCOUNTER — TELEPHONE (OUTPATIENT)
Dept: ALLERGY | Facility: CLINIC | Age: 44
End: 2019-08-14

## 2019-08-14 NOTE — TELEPHONE ENCOUNTER
----- Message from Emma Live sent at 8/14/2019  1:26 PM CDT -----  Contact: Terrence Kwong  Needs Advice    Reason for call:Elenita is calling to see if a fax was received asking for an updated prescription for pt from 10g to 12 g for (HIZENTRA)        Communication Preference:Please give Elenita a call back at [phone] option 2 , then option 2 again to speak to pharmacist.    Additional Information:immun glob G,IgG,-pro-IgA 0-50 (HIZENTRA) 10 gram/50 mL (20 %) Soln 240 mL 11 8/6/2019    Sig - Route: Inject 12 g into the skin once a week. - Subcutaneous   Class: Print

## 2019-08-15 DIAGNOSIS — E55.9 VITAMIN D INSUFFICIENCY: ICD-10-CM

## 2019-08-15 RX ORDER — ASPIRIN 325 MG
50000 TABLET, DELAYED RELEASE (ENTERIC COATED) ORAL
Qty: 24 CAPSULE | Refills: 1 | OUTPATIENT
Start: 2019-08-15

## 2019-08-19 ENCOUNTER — OFFICE VISIT (OUTPATIENT)
Dept: ALLERGY | Facility: CLINIC | Age: 44
End: 2019-08-19
Payer: COMMERCIAL

## 2019-08-19 VITALS — BODY MASS INDEX: 33.34 KG/M2 | HEIGHT: 64 IN | WEIGHT: 195.31 LBS

## 2019-08-19 DIAGNOSIS — M35.00 SJOGREN'S SYNDROME, WITH UNSPECIFIED ORGAN INVOLVEMENT: ICD-10-CM

## 2019-08-19 DIAGNOSIS — L02.212 ABSCESS OF LOWER BACK: ICD-10-CM

## 2019-08-19 DIAGNOSIS — K21.9 GASTROESOPHAGEAL REFLUX DISEASE WITHOUT ESOPHAGITIS: ICD-10-CM

## 2019-08-19 DIAGNOSIS — D80.6 ANTI-POLYSACCHARIDE ANTIBODY DEFICIENCY: Primary | ICD-10-CM

## 2019-08-19 PROCEDURE — 3008F PR BODY MASS INDEX (BMI) DOCUMENTED: ICD-10-PCS | Mod: CPTII,S$GLB,, | Performed by: ALLERGY & IMMUNOLOGY

## 2019-08-19 PROCEDURE — 3008F BODY MASS INDEX DOCD: CPT | Mod: CPTII,S$GLB,, | Performed by: ALLERGY & IMMUNOLOGY

## 2019-08-19 PROCEDURE — 99214 PR OFFICE/OUTPT VISIT, EST, LEVL IV, 30-39 MIN: ICD-10-PCS | Mod: S$GLB,,, | Performed by: ALLERGY & IMMUNOLOGY

## 2019-08-19 PROCEDURE — 99999 PR PBB SHADOW E&M-EST. PATIENT-LVL III: CPT | Mod: PBBFAC,,, | Performed by: ALLERGY & IMMUNOLOGY

## 2019-08-19 PROCEDURE — 99999 PR PBB SHADOW E&M-EST. PATIENT-LVL III: ICD-10-PCS | Mod: PBBFAC,,, | Performed by: ALLERGY & IMMUNOLOGY

## 2019-08-19 PROCEDURE — 99214 OFFICE O/P EST MOD 30 MIN: CPT | Mod: S$GLB,,, | Performed by: ALLERGY & IMMUNOLOGY

## 2019-08-19 RX ORDER — CEPHALEXIN 500 MG/1
500 CAPSULE ORAL EVERY 6 HOURS
Qty: 28 CAPSULE | Refills: 0 | Status: SHIPPED | OUTPATIENT
Start: 2019-08-19 | End: 2019-08-26
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
## 2019-08-22 ENCOUNTER — TELEPHONE (OUTPATIENT)
Dept: ALLERGY | Facility: CLINIC | Age: 44
End: 2019-08-22

## 2019-08-22 NOTE — TELEPHONE ENCOUNTER
Hizentra increased from 10 grams to 12 grams.  Clinicals and labs fax[facility] to [facility] to [phone]

## 2019-08-28 DIAGNOSIS — E55.9 VITAMIN D INSUFFICIENCY: ICD-10-CM

## 2019-08-28 RX ORDER — ASPIRIN 325 MG
50000 TABLET, DELAYED RELEASE (ENTERIC COATED) ORAL
Qty: 24 CAPSULE | Refills: 1 | OUTPATIENT
Start: 2019-08-29

## 2019-09-04 ENCOUNTER — TELEPHONE (OUTPATIENT)
Dept: ALLERGY | Facility: CLINIC | Age: 44
End: 2019-09-04

## 2019-09-04 NOTE — TELEPHONE ENCOUNTER
Received a fax from [employer] stating that Hizentra 4 gram/20 ml vial has been approved from 8/28/19-8/28/2020    PA number [id number]    [facility] number [phone]  Contract number [id number]    Letter dated 8/30/19 and sent to scanning.

## 2019-11-04 RX ORDER — ALBUTEROL SULFATE 90 UG/1
AEROSOL, METERED RESPIRATORY (INHALATION)
Qty: 25.5 INHALER | Refills: 3 | Status: SHIPPED | OUTPATIENT
Start: 2019-11-04 | End: 2020-03-16

## 2019-11-19 ENCOUNTER — OFFICE VISIT (OUTPATIENT)
Dept: FAMILY MEDICINE | Facility: CLINIC | Age: 44
End: 2019-11-19
Payer: COMMERCIAL

## 2019-11-19 ENCOUNTER — HOSPITAL ENCOUNTER (OUTPATIENT)
Dept: RADIOLOGY | Facility: HOSPITAL | Age: 44
Discharge: HOME | End: 2019-11-19
Attending: NURSE PRACTITIONER
Payer: COMMERCIAL

## 2019-11-19 VITALS
TEMPERATURE: 99 F | HEIGHT: 64 IN | HEART RATE: 99 BPM | OXYGEN SATURATION: 99 % | BODY MASS INDEX: 33.29 KG/M2 | WEIGHT: 195 LBS | DIASTOLIC BLOOD PRESSURE: 72 MMHG | SYSTOLIC BLOOD PRESSURE: 102 MMHG

## 2019-11-19 DIAGNOSIS — Z12.31 VISIT FOR SCREENING MAMMOGRAM: ICD-10-CM

## 2019-11-19 DIAGNOSIS — Z00.00 WELL WOMAN EXAM (NO GYNECOLOGICAL EXAM): Primary | ICD-10-CM

## 2019-11-19 DIAGNOSIS — Z23 NEED FOR INFLUENZA VACCINATION: ICD-10-CM

## 2019-11-19 PROCEDURE — 90471 FLU VACCINE (QUAD) GREATER THAN OR EQUAL TO 3YO PRESERVATIVE FREE IM: ICD-10-PCS | Mod: S$GLB,,, | Performed by: NURSE PRACTITIONER

## 2019-11-19 PROCEDURE — 77063 BREAST TOMOSYNTHESIS BI: CPT | Mod: 26,,, | Performed by: RADIOLOGY

## 2019-11-19 PROCEDURE — 77063 BREAST TOMOSYNTHESIS BI: CPT | Mod: TC,PO

## 2019-11-19 PROCEDURE — 90686 IIV4 VACC NO PRSV 0.5 ML IM: CPT | Mod: S$GLB,,, | Performed by: NURSE PRACTITIONER

## 2019-11-19 PROCEDURE — 77063 MAMMO DIGITAL SCREENING BILAT WITH TOMOSYNTHESIS_CAD: ICD-10-PCS | Mod: 26,,, | Performed by: RADIOLOGY

## 2019-11-19 PROCEDURE — 99999 PR PBB SHADOW E&M-EST. PATIENT-LVL V: CPT | Mod: PBBFAC,,, | Performed by: NURSE PRACTITIONER

## 2019-11-19 PROCEDURE — 99396 PR PREVENTIVE VISIT,EST,40-64: ICD-10-PCS | Mod: 25,S$GLB,, | Performed by: NURSE PRACTITIONER

## 2019-11-19 PROCEDURE — 90686 FLU VACCINE (QUAD) GREATER THAN OR EQUAL TO 3YO PRESERVATIVE FREE IM: ICD-10-PCS | Mod: S$GLB,,, | Performed by: NURSE PRACTITIONER

## 2019-11-19 PROCEDURE — 99999 PR PBB SHADOW E&M-EST. PATIENT-LVL V: ICD-10-PCS | Mod: PBBFAC,,, | Performed by: NURSE PRACTITIONER

## 2019-11-19 PROCEDURE — 77067 SCR MAMMO BI INCL CAD: CPT | Mod: TC,PO

## 2019-11-19 PROCEDURE — 90471 IMMUNIZATION ADMIN: CPT | Mod: S$GLB,,, | Performed by: NURSE PRACTITIONER

## 2019-11-19 PROCEDURE — 99396 PREV VISIT EST AGE 40-64: CPT | Mod: 25,S$GLB,, | Performed by: NURSE PRACTITIONER

## 2019-11-19 PROCEDURE — 77067 SCR MAMMO BI INCL CAD: CPT | Mod: 26,,, | Performed by: RADIOLOGY

## 2019-11-19 PROCEDURE — 77067 MAMMO DIGITAL SCREENING BILAT WITH TOMOSYNTHESIS_CAD: ICD-10-PCS | Mod: 26,,, | Performed by: RADIOLOGY

## 2019-11-19 NOTE — PATIENT INSTRUCTIONS
Prevention Guidelines, Women Ages 40 to 49  Screening tests and vaccines are an important part of managing your health. Health counseling is essential, too. Below are guidelines for these, for women ages 40 to 49. Talk with your healthcare provider to make sure youre up-to-date on what you need.  Screening Who needs it How often   Type 2 diabetes or prediabetes All adults beginning at age 45 and adults without symptoms at any age who are overweight or obese and have 1 or more additional risk factors for diabetes At least every 3 years   Alcohol misuse All women in this age group At routine exams   Blood pressure All women in this age group Every 2 years if your blood pressure is less than 120/80 mm Hg; yearly if your systolic blood pressure is 120 to 139 mm Hg, or your diastolic blood pressure reading is 80 to 89 mm Hg   Breast cancer All women in this age group Yearly mammogram and clinical breast exam2  Each woman should make her own decision. If a woman decides to have mammograms before age 50, they should be done every 2 years.3   Cervical cancer All women in this age group, except women who have had a complete hysterectomy Pap test every 3 years or Pap test plus human papilloma virus (HPV) test every 5 years   Chlamydia Women at increased risk for infection At routine exams if you're at risk or have symptoms   Depression All women in this age group At routine exams   Gonorrhea Sexually active women at increased risk for infection At routine exams   Hepatitis C Anyone at increased risk; 1 time for those born between 1945 and 1965 At routine exams   High cholesterol or triglycerides All women ages 45 and older who are at risk for coronary artery disease; younger women, talk with your healthcare provider At least every 5 years   HIV All women At routine exams   Obesity All women in this age group At routine exams   Syphilis Women at increased risk for infection - talk with your healthcare provider At routine  exams   Tuberculosis Women at increased risk for infection - talk with your healthcare provider Ask your healthcare provider   Vision All women in this age group Complete exam at age 40 and eye exams every 2 to 4 years. If you have a chronic disease, ask your healthcare provider how often you should have your eyes examined.4   Vaccine Who needs it How often   Chickenpox (varicella) All women in this age group who have no record of this infection or vaccine 2 doses; the second dose should be given at least 4 weeks after the first dose   Hepatitis A Women at increased risk for infection - talk with your healthcare provider 2 doses given 6 months apart   Hepatitis B Women at increased risk for infection - talk with your healthcare provider 3 doses over 6 months; second dose should be given 1 month after the first dose; the third dose should be given at least 2 months after the second dose and at least 4 months after the first dose   Haemophilus influenzae Type B (HIB) Women at increased risk 1 to 3 doses   Influenza (flu) All women in this age group Once a year   Measles, mumps, rubella (MMR) All women in this age group who have no record of these infections or vaccines 1 or 2 doses   Meningococcal Women at increased risk for infection - talk with your healthcare provider 1 or more doses   Pneumococcal conjugate vaccine (PCV13) and pneumococcal polysaccharide vaccine (PPSV23) Women at increased risk for infection - talk with your healthcare provider 1 or 2 doses   Tetanus/diphtheria/pertussis (Td/Tdap) booster All women in this age group A one-time dose of Tdap instead of a Td booster after age 18, then Td every 10 years   Counseling Who needs it How often   BRCA gene mutation testing for breast and ovarian cancer susceptibility Women with increased risk for having gene mutation When your risk is known   Breast cancer and chemoprevention Women at high risk for breast cancer When your risk is known   Diet and exercise  Women who are overweight or obese When diagnosed, and then at routine exams   Domestic violence Women at the age in which they are able to have children At routine exams   Sexually transmitted infection prevention Women at increased risk for infection - talk with your healthcare provider At routine exams   Use of tobacco and the health effects it can cause All women in this age group Every exam   1[facility] [employer] Association  2American Cancer Society  3U.S. Preventive Services Task Force  4[facility] Academy of Ophthalmology  Date Last Reviewed: 8/27/2015  © 4218-6940 [employer]. [address] [place] [address], [place] [id number]. All rights reserved. This information is not intended as a substitute for professional medical care. Always follow your healthcare professional's instructions.

## 2019-11-19 NOTE — LETTER
November 19, 2019      [facility]  [address] [place] [address]  Phone: [phone]  Fax: [phone]       Patient: Manasa Hollins   YOB: 1975  Date of Visit: 11/19/2019    To Whom It May Concern:    Alberto Hollins  was at [facility] on 11/19/2019. She may return to work/school on 11/19/2019 with no restrictions. If you have any questions or concerns, or if I can be of further assistance, please do not hesitate to contact me.    Sincerely,    CADY [place]

## 2019-11-19 NOTE — PROGRESS NOTES
"Well Woman VISIT      CHIEF COMPLAINT  Chief Complaint   Patient presents with    Annual Exam    Flu Vaccine       HPI  Manasa Hollins is a 43 y.o. female who presents annual exam.     Social Factors  Tobacco use: No  Ready to Quit: NA  Alcohol: No  Intimate partner violence screening  "Do you feel safe in your current relationship?" Yes   "Have you ever been in a relationship in which your partner frightened you or hurt you?" No  Living Will/POA: No  Regular Exercise: No    Depression  Over the past two weeks, have you felt down, depressed, or hopeless? No  Over the past two weeks, have you felt little interest or pleasure in doing things? No    Reproductive Health  Last menstrual period began 11/19/2019* and was regular.   Patient is sexually active.   Contraception: no method  On Daily folic acid:  No  STD screening in last year: No  Last PAP: 10/11/2017  HIV screening: No    CHD, HTN, DM2  CHD Risk Factors: obesity (BMI >= 30 kg/m2) and sedentary lifestyle  Women 45 years and older should be screened for dyslipidemia if at increased risk of CHD  Women 20 to 45 years of age should be screened for dyslipidemia if at increased risk of CHD  Asymptomatic adults with sustained blood pressure greater than 135/80 mm Hg (treated or untreated) should be screened for type 2 diabetes mellitus    Estimated body mass index is 33.47 kg/m² as calculated from the following:    Height as of this encounter: 5' 4" (1.626 m).    Weight as of this encounter: 88.5 kg (195 lb).      Screening  Mammogram needed: ordered  Colonoscopy needed: not clinically indicated  Osteoporosis screen needed: not clinically indicated     Women 50 to 74 years of age should be screened for breast cancer with mammography biennially.  Women should be screened for cervical cancer with Pap tests beginning at 21 years of age. Low-risk women should receive Pap testing every three years. Co-testing for human papillomavirus is an option beginning " "at 30 years of age, and can extend the screening interval to five years. Cervical cancer screening should be discontinued at 65 years of age or after total hysterectomy if the woman has a benign gynecologic history  Adults 50 to 75 years of age should be screened for colorectal cancer with an FOBT annually, sigmoidoscopy every five years with an FOBT every three years, or colonoscopy every 10 years.  Women 65 years and older should be screened for osteoporosis. Women younger than 65 years should be screened if the risk of fracture is greater than or equal to that of a 65-year-old white woman without additional risk factors.      Immunizations  up to date and documented    ALLERGIES and MEDS were verified.   PMHx, PSHx, FHx, SOCIALHx were updated as pertinent.    REVIEW OF SYSTEMS  Review or Systems  Eyes: denies visual changes at this time denies floaters   ENT: no nasal congestion or sore throat  Respiratory: no cough or shorness of breath  Cardiovascular: no chest pain or palpitations  Gastrointestinal: no nausea or vomiting, no abdominal pain or change in bowel habits  Genitourinary: no hematuria or dysuria; denies frequency  Hematologic/Lymphatic: no easy bruising or lymphadenopathy  Musculoskeletal: no arthralgias or myalgias  Neurological: no seizures or tremors  Endocrine: no heat or cold intolerance      PHYSICAL EXAM  VITAL SIGNS: /72 (BP Location: Right arm, Patient Position: Sitting, BP Method: Large (Manual))   Pulse 99   Temp 98.8 °F (37.1 °C) (Oral)   Ht 5' 4" (1.626 m)   Wt 88.5 kg (195 lb)   LMP 11/19/2019   SpO2 99%   BMI 33.47 kg/m²   GEN: Well developed, Well nourished, No acute distress.  HENT: Normocephalic, Atraumatic, Bilateral external ears normal, Nose normal, Oropharynx moist, No oral exudates.   Eyes: PERRLA, EOMI, Conjunctiva normal, No discharge.   Neck: Supple, No tenderness.  Lymphatic: No cervical or supraclavicular lymphadenopathy noted.   Cardiovascular: Normal heart " rate, Normal rhythm, No murmurs, No rubs, No gallops.   Thorax & Lungs: Normal breath sounds, No respiratory distress, No wheezing.  Abdomen: Soft, No tenderness, Bowel sounds normal.  Skin: Warm, Dry, No erythema, No rash.   Extremities: No edema, No tenderness.       ASSESSMENT/PLAN    Manasa was seen today for annual exam and flu vaccine.    Diagnoses and all orders for this visit:    Well woman exam (no gynecological exam)  -     TSH; Future  -     Lipid panel; Future  -     TSH    Visit for screening mammogram  -     Mammo Digital Screening Bilateral With CAD; Future    Need for influenza vaccination  -     Influenza - Quadrivalent (PF)       Health Maintenance: addressed in clinic  Preventative Care: annual eye and dental exams      FOLLOW UP: Follow up in about 1 year (around 11/19/2020).

## 2020-01-11 NOTE — PROGRESS NOTES
Subjective:       Patient ID: Manasa Hollins is a 44 y.o. female with Sjogren's syndrome.     Chief Complaint: No chief complaint on file.    45 yo lady w Sjogren's with + serology & marked hypergammaglobulinemia but with IgG2 deficiency and recurrent ear & sinus infections. She was prescribed Hizentra (immune globulin) SC and started it at the end of December, 2016.  She also has anemia, most likely of chronic disease. She has been seen by hematology who felt anemia was due to chronic inflammation with associated abnormal utilization of iron.    Returns for follow-up. Last seen on  3/28/19. She is on  mg/d & pilocarpine 7.5 mg qid. She denies any parotid swelling now, but has been c/o worsening of dry mouth and eyes. Her dentist thinks that she may need to have some teeth extracted.  Also c/o photophobia and feels that she needs tinting of her windows and glasses but the optometrist she saw told her to get a prescription from us?  She would like to see someone else. She has started xiidra but so far hasn't helped. She is also using OTC drops.     AM stiffness x 20-30 minutes. Denies joint pain except of R trochanteric pain with walking; sleeps on her back; denies joint swelling, Raynaud's, tight skin, alopecia, oral ulcers, pericarditis, photosensitivity, skin rashes, miscarriages (but had one premature birth at 26.5 weeks) & thromboses.    Is continuing with non restorative sleep and fatigue. Is still using sleepy time tea extra + melatonin up to 5 mg & lavender candles. Has no problem going to sleep, but cannot stay asleep. States that she is exercising.       Pertinent hx from previous visit:   40 yr old lady with chronic sinusitis with dry nose, mouth and & dry eyes (keratitis documented-photo displayed on 10/12/15 visit) initially sent to rule out Sjogren's after she requested testing and was found to have a +MANISHA 1:250 sp with +SSA & +SSB. Found to have non tender parotid gland enlargement.   She was previously begun on pilocarpine 5 mg qid  Sxs remain the same--she minimizes them. She did not check with pulmonary as she was admitted in November with fever and bilateral otomastoiditis. She is now improved. Gets does get frequent dental cleaning     Current Outpatient Medications   Medication Sig Dispense Refill    acetaminophen (TYLENOL ARTHRITIS ORAL) Take by mouth.      albuterol (PROAIR HFA) 90 mcg/actuation inhaler INHALE 2 PUFFS INTO THE LUNGS EVERY 4 HOURS AS NEEDED 8.5 g 11    albuterol (PROAIR HFA) 90 mcg/actuation inhaler INHALE 2 PUFFS INTO THE LUNGS EVERY 4 HOURS AS NEEDED 25.5 Inhaler 3    azelastine (ASTELIN) 137 mcg (0.1 %) nasal spray 1 SPRAY (137 MCG TOTAL) BY NASAL ROUTE 2 (TWO) TIMES DAILY. 30 mL 0    b complex vitamins tablet Take 1 tablet by mouth every morning.       CALCIUM CARBONATE/VITAMIN D3 (VITAMIN D-3 ORAL) Take 4,000 Units by mouth every morning.       calcium-magnesium-zinc 333-133-5 mg Tab Take 1 tablet by mouth every morning.       CARBOXYMETHYLCELLULOSE SODIUM (REFRESH TEARS OPHT) Apply 2 drops to eye as needed.       hydroxychloroquine (PLAQUENIL) 200 mg tablet TAKE 2 TABLETS (400 MG TOTAL) BY MOUTH ONCE DAILY. 180 tablet 2    immun glob G,IgG,-pro-IgA 0-50 (HIZENTRA) 10 gram/50 mL (20 %) Soln Inject 60 mLs (12 g total) into the skin once a week. 240 mL 11    levocetirizine (XYZAL) 5 MG tablet Take 1 tablet (5 mg total) by mouth every evening. 30 tablet 0    lifitegrast (XIIDRA) 5 % Dpet Apply 1 drop to eye 2 (two) times daily. 60 each 11    linaclotide (LINZESS) 145 mcg Cap capsule Take 1 capsule (145 mcg total) by mouth once daily. 90 capsule 3    omeprazole (PRILOSEC) 40 MG capsule Take 1 capsule (40 mg total) by mouth every morning. 90 capsule 3    pilocarpine HCl (SALAGEN) 7.5 mg tablet Take 1 tablet (7.5 mg total) by mouth 4 (four) times daily. 120 tablet 11    PREVIDENT 0.2 % Soln USE AS DIRECTED  0    PREVIDENT 5000 PLUS 1.1 % Crea       saliva  stimulant agents comb.3 (BIOTENE MOISTURIZING MOUTH) Spry by Mucous Membrane route as needed.       SALIVA SUBSTITUTION COMBO NO.9 (BIOTENE DRY MOUTH ORAL RINSE MM) by Mucous Membrane route as needed.       triamcinolone acetonide 0.1% (KENALOG) 0.1 % ointment Apply topically 2 (two) times daily. 80 g 3    UNABLE TO FIND Take 1 tablet by mouth every morning. Kavin red vitamins 500 mg      vitamin A 8000 UNIT capsule Take 8,000 Units by mouth every morning.       cholecalciferol, vitamin D3, (DECARA) 50,000 unit capsule Take 1 capsule (50,000 Units total) by mouth twice a week. (Patient not taking: Reported on 1/14/2020) 30 capsule 0     No current facility-administered medications for this visit.            Review of Systems   Constitutional: Positive for fatigue. Negative for diaphoresis, fever and unexpected weight change.   HENT: Positive for trouble swallowing. Negative for mouth sores, sore throat and tinnitus.         Dry mouth   Eyes: Positive for photophobia. Negative for redness and visual disturbance.        Dry eyes   Respiratory: Positive for cough (In AM; ) and shortness of breath (warm weather makes her pant; maybe allergic to oak trees). Negative for choking and chest tightness.    Cardiovascular: Positive for chest pain. Negative for palpitations and leg swelling.   Gastrointestinal: Positive for constipation (helped by linzess every other day & exercise. ). Negative for abdominal distention, abdominal pain, blood in stool, diarrhea, nausea and vomiting.        Heartburn   Endocrine: Positive for cold intolerance.   Genitourinary: Negative.  Negative for dysuria, frequency, genital sores, hematuria and menstrual problem.   Musculoskeletal: Positive for arthralgias (knees without swelling.). Negative for back pain, joint swelling, myalgias, neck pain and neck stiffness.   Skin: Negative for color change and rash.   Allergic/Immunologic: Negative.    Neurological: Positive for numbness and  "headaches. Negative for dizziness, syncope, weakness and light-headedness.   Hematological: Negative.  Negative for adenopathy. Does not bruise/bleed easily.   Psychiatric/Behavioral: Positive for dysphoric mood (denies depression but feels "blue") and sleep disturbance. The patient is not nervous/anxious.          Review of patient's allergies indicates:   Allergen Reactions    Sulfa dyne      Other reaction(s): CAUSES ASTHMA ATTACK         Past Surgical History:   Procedure Laterality Date     SECTION      x4    COLONOSCOPY N/A 2019    Procedure: COLONOSCOPY;  Surgeon: Karri Barragan MD;  Location: [address]);  Service: Endoscopy;  Laterality: N/A;    MYOMECTOMY      TUBAL LIGATION      done during myometomy surgery.    upper gi  2016         Family History   Problem Relation Age of Onset    Cancer Mother         Lung/Cervical    Cancer Maternal Grandmother         Breast/Cervical    Cancer Other         Breast     Asthma Child     Glaucoma Father     No Known Problems Sister     No Known Problems Brother     No Known Problems Maternal Aunt     No Known Problems Maternal Uncle     No Known Problems Paternal Aunt     No Known Problems Paternal Uncle     No Known Problems Maternal Grandfather     No Known Problems Paternal Grandmother     No Known Problems Paternal Grandfather     Emphysema Neg Hx     Colon cancer Neg Hx     Stomach cancer Neg Hx     Esophageal cancer Neg Hx     Ulcerative colitis Neg Hx     Crohn's disease Neg Hx     Irritable bowel syndrome Neg Hx     Celiac disease Neg Hx     Amblyopia Neg Hx     Blindness Neg Hx     Cataracts Neg Hx     Diabetes Neg Hx     Hypertension Neg Hx     Macular degeneration Neg Hx     Retinal detachment Neg Hx     Strabismus Neg Hx     Stroke Neg Hx     Thyroid disease Neg Hx    Mom & Dad both had sickle cell trait.  Mom developed depression.   Mom  with lung cancer (non smoker) & had cervical " "cancer and another cancer as well.    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    Objective:       /78   Pulse 85   Ht 5' 4" (1.626 m)   Wt 91 kg (200 lb 9.9 oz)   BMI 34.44 kg/m²   Was 202 lb 2.6 oz on 3/8/19.  Was 206 lb on 8/13/18  Was 205 lb 6.4 oz on 2/8/18  Was 208 lbs 14.4 oz on 3/28/17  Was 198 lbs 12.8 oz on 11/22/16.  Was 191 lbs 6.4 oz on 7/21/16.   Physical Exam   Vitals reviewed.  Constitutional: She is oriented to person, place, and time and well-developed, well-nourished, and in no distress. No distress.   HENT:   Head: Normocephalic and atraumatic.   Mouth/Throat: Oropharynx is clear and moist. No oropharyngeal exudate.   No facial rashes  Parotids minimally enlarged &, non tender  Lacrimals minimally enlarged  Decreased  moisture of oral mucosa   Teeth in need of some repair  No oral ulcers     Eyes: Conjunctivae and EOM are normal. Pupils are equal, round, and reactive to light. Right eye exhibits no discharge. Left eye exhibits no discharge. No scleral icterus.   Neck: Neck supple. No JVD present. No tracheal deviation present. No thyromegaly present.   Cardiovascular: Normal rate, regular rhythm, normal heart sounds and intact distal pulses.  Exam reveals no gallop and no friction rub.    No murmur heard.  Pulmonary/Chest: Breath sounds normal. No respiratory distress. She has no wheezes. She has no rales. She exhibits no tenderness.   Abdominal: Soft. Bowel sounds are normal. She exhibits no distension and no mass. There is no splenomegaly or hepatomegaly. There is no tenderness. There is no rebound and no guarding. "   Lymphadenopathy:     She has no cervical adenopathy.   Neurological: She is alert and oriented to person, place, and time. She has normal reflexes. No cranial nerve deficit. Gait normal.   Proximal and distal muscle strength 5/5 .   Skin: Skin is warm and dry. She is not diaphoretic.     Psychiatric: Mood, memory, affect and judgment normal.   Musculoskeletal: Normal range of motion. She exhibits tenderness (R trochanteric bursa 3/5). She exhibits no edema.   Cspine FROM no tenderness  Tspine FROM no tenderness  Lspine FROM no tenderness.  TMJ: unremarkable  Shoulders: FROM; no synovitis;  Elbows: FROM; no synovitis; no tophi or nodules  Wrists: FROM; no synovitis;    MCPs: FROM; Mild enlargement of 1st MCP on left; no synovitis; no metacarpalgia;  ok;  PIPs: FROM; no synovitis; no tenderness of any joints.   DIPs: FROM; no synovitis;   HIPS: FROM  Knees: FROM; no crepitus; no synovitis; no instability;  Ankles: FROM: no synovitis   Toes: ok; no metatarsalgia             LABS:  11/19/19: TSH ok;   3/28/19: ; CRP 5.7; CBC Hg 9.5; Ht 30.2;  Sat iron 10 %(20); CMP TP 10.5; Alb 3.0; MANISHA >1:2560 H/sp; +SSA+SSB; Ig  3/13/19: Vit D 21; Vit B12 ok;   12/5/18: CBC Hg 9.1 Ht 29.1; CMP TP 9.9; alb 3.1;  IgG 4488 (1600); IgM 44 (50); IgA 459 (350)  12/18/17: CMP TP 10.0; Alb. 3.0;  11/22/17: Hg 8.9; Ht 28.1  4/27/17: ; CRP 4.6; Hg 9.4; Ht 28.9; Na 133; TP 10.1; Alb. 2.6;   12/21/16: Hg 9.8; Ht 29.6; Na 135; Alb. 2.9;   8/24/16: ; CRP 3.9; Hg 8.2; Ht 25.2; CMP TP 9.4; Alb. 2.4; Ca 8.4; CPK 88; SPE elevated TP & gammaglobulins with oligoclonal bands.   7/2/16: ;   5/3/16: Hg 8.9; Ht 27.5; IgG1 3490 (1140); IgG2 56 (150);  4/21/16: ; CRP 6.7; Hg 9.1; Ht 28.7; CMP Alb. 2.8; TP 9.4; UA ok; U pr/cnne .37 (.20)  3/11/16: CMP Ca 8.2; Alb. 1.4  3/7/16: UA 3+ pr; 2+ OB; * RBCs 16 WBC, 9 HC  12/1/15: Hg 7.3; Ht 22.5; K+ 3.4; Ca 8.6;   8/18/15: UA ok;  8/12/15: LAC neg; aCLA neg; C3, C4 ok; dsDNA  "neg;       Imagin16: Bilateral knee x-rays: personally reviewed: very mild bilateral DJD.  16: CT chest: personally reviewed: unremarkable.  16: CXR: personally reviewed: prob. Ok; RLL opacity c/w scarring  10/12/15: CXR: personally reviewed with patient: OK  10/12/15: Bilateral Hands: personally reviewed with patient: ok  Assessment:   Sjogren's syndrome -- stable/improved    Sicca sxs with dry mouth with dental carries, dry eyes & dry mouth     Response to pilocarpine      But cough & mild SOB following--patient opts to continue    Bilateral parotid gland swelling    +MANISHA 1:2560; +SSA; +SSB: TP 10.5;     RF neg; LAC, aCLA neg (premature birth at 26.5 wks).     C3, C4, UA ok; elevated IgG 4489 (1600); IgA 495 (350);     + FH SLE (sister)    On  mg/d & pilocarpine 7.5 mg qid     R trochanteric bursitis    Anemia:  NCNC--chronic; probably mostly chronic disease (according to ) with mild iron deficiency   Hg electrophoresis A2   Lowest Ht 18.1; highest 34.6   Mom & Dad had sickle cell trait   Some component of iron deficiency    retic ok; MMA ok;    G6PD ok; hapto not low    Vitamin D insufficiency    Non restorative sleep associated with fatigue & features of FMS    Bilateral knee pain/weakness--stable   Imaging with minimal bilateral OA    Recurrent ENT & possibly pulmonary infections associated with IgG2 deficiency   S/P bilateral otomastoiditis with fever hospitalized 11/15    Possibly parotidomegaly played a role.   Pulmonary issues:     S/P CAP 3/17/16:     Also with chronic cough & SOB     She's had "allergic cough" in past.     Salagen as a culprit ruled out     CT chest 16: unremarkable   Inadequate response to pneumovax   IgG2 deficiency on Hizentra     Reactive Airways/Asthma    Some response to advair    Obesity--improving      Plan:   Stay on  mg/d with eye exams.  Ok to continue pilocarpine 7.5 mg qid.  Labs & Urine today. Include SPE & IEP  Continue OTC for " dry mouth and eyes prn  Discussed trochanteric bursitis and rx.  Handouts provided.  Injection declined.   Continue aerobic exercise  Ambulatory referral to Optometry: Dr. Wheat for HCQ & photophobia.   RTC 6 months or prn.    Addendum: 1/14/20: ESR 48 (36); CRP 3.2;  Hg 9.9; Ht 31.7;  CMP Na 135; TP 10.8; alb 3.2; ; Vit D 55;

## 2020-01-14 ENCOUNTER — OFFICE VISIT (OUTPATIENT)
Dept: RHEUMATOLOGY | Facility: CLINIC | Age: 45
End: 2020-01-14
Payer: COMMERCIAL

## 2020-01-14 ENCOUNTER — LAB VISIT (OUTPATIENT)
Dept: LAB | Facility: HOSPITAL | Age: 45
End: 2020-01-14
Attending: INTERNAL MEDICINE
Payer: COMMERCIAL

## 2020-01-14 VITALS
HEART RATE: 85 BPM | BODY MASS INDEX: 34.25 KG/M2 | WEIGHT: 200.63 LBS | DIASTOLIC BLOOD PRESSURE: 78 MMHG | HEIGHT: 64 IN | SYSTOLIC BLOOD PRESSURE: 122 MMHG

## 2020-01-14 DIAGNOSIS — H53.143 PHOTOPHOBIA, BILATERAL: ICD-10-CM

## 2020-01-14 DIAGNOSIS — Z79.899 LONG-TERM USE OF HYDROXYCHLOROQUINE: ICD-10-CM

## 2020-01-14 DIAGNOSIS — E66.9 OBESITY (BMI 30.0-34.9): ICD-10-CM

## 2020-01-14 DIAGNOSIS — E55.9 VITAMIN D INSUFFICIENCY: ICD-10-CM

## 2020-01-14 DIAGNOSIS — D80.3 IGG2 DEFICIENCY: ICD-10-CM

## 2020-01-14 DIAGNOSIS — M35.09 SJOGREN'S SYNDROME WITH OTHER ORGAN INVOLVEMENT: Primary | ICD-10-CM

## 2020-01-14 DIAGNOSIS — M35.09 SJOGREN'S SYNDROME WITH OTHER ORGAN INVOLVEMENT: ICD-10-CM

## 2020-01-14 DIAGNOSIS — M70.61 TROCHANTERIC BURSITIS, RIGHT HIP: ICD-10-CM

## 2020-01-14 DIAGNOSIS — K11.1 PAROTID GLAND ENLARGEMENT: ICD-10-CM

## 2020-01-14 LAB
25(OH)D3+25(OH)D2 SERPL-MCNC: 55 NG/ML (ref 30–96)
ALBUMIN SERPL BCP-MCNC: 3.2 G/DL (ref 3.5–5.2)
ALP SERPL-CCNC: 35 U/L (ref 55–135)
ALT SERPL W/O P-5'-P-CCNC: 16 U/L (ref 10–44)
ANION GAP SERPL CALC-SCNC: 5 MMOL/L (ref 8–16)
AST SERPL-CCNC: 20 U/L (ref 10–40)
BASOPHILS # BLD AUTO: 0.02 K/UL (ref 0–0.2)
BASOPHILS NFR BLD: 0.4 % (ref 0–1.9)
BILIRUB SERPL-MCNC: 0.5 MG/DL (ref 0.1–1)
BUN SERPL-MCNC: 9 MG/DL (ref 6–20)
CALCIUM SERPL-MCNC: 9.1 MG/DL (ref 8.7–10.5)
CHLORIDE SERPL-SCNC: 105 MMOL/L (ref 95–110)
CK SERPL-CCNC: 124 U/L (ref 20–180)
CO2 SERPL-SCNC: 25 MMOL/L (ref 23–29)
CREAT SERPL-MCNC: 0.8 MG/DL (ref 0.5–1.4)
CRP SERPL-MCNC: 3.2 MG/L (ref 0–8.2)
DIFFERENTIAL METHOD: ABNORMAL
EOSINOPHIL # BLD AUTO: 0.1 K/UL (ref 0–0.5)
EOSINOPHIL NFR BLD: 0.9 % (ref 0–8)
ERYTHROCYTE [DISTWIDTH] IN BLOOD BY AUTOMATED COUNT: 13.6 % (ref 11.5–14.5)
ERYTHROCYTE [SEDIMENTATION RATE] IN BLOOD BY WESTERGREN METHOD: 48 MM/HR (ref 0–36)
EST. GFR  (AFRICAN AMERICAN): >60 ML/MIN/1.73 M^2
EST. GFR  (NON AFRICAN AMERICAN): >60 ML/MIN/1.73 M^2
GLUCOSE SERPL-MCNC: 86 MG/DL (ref 70–110)
HCT VFR BLD AUTO: 31.7 % (ref 37–48.5)
HGB BLD-MCNC: 9.9 G/DL (ref 12–16)
IMM GRANULOCYTES # BLD AUTO: 0.01 K/UL (ref 0–0.04)
IMM GRANULOCYTES NFR BLD AUTO: 0.2 % (ref 0–0.5)
LYMPHOCYTES # BLD AUTO: 1.3 K/UL (ref 1–4.8)
LYMPHOCYTES NFR BLD: 23 % (ref 18–48)
MCH RBC QN AUTO: 29.6 PG (ref 27–31)
MCHC RBC AUTO-ENTMCNC: 31.2 G/DL (ref 32–36)
MCV RBC AUTO: 95 FL (ref 82–98)
MONOCYTES # BLD AUTO: 0.3 K/UL (ref 0.3–1)
MONOCYTES NFR BLD: 5.3 % (ref 4–15)
NEUTROPHILS # BLD AUTO: 3.9 K/UL (ref 1.8–7.7)
NEUTROPHILS NFR BLD: 70.2 % (ref 38–73)
NRBC BLD-RTO: 0 /100 WBC
PLATELET # BLD AUTO: 196 K/UL (ref 150–350)
PMV BLD AUTO: 11.7 FL (ref 9.2–12.9)
POTASSIUM SERPL-SCNC: 3.7 MMOL/L (ref 3.5–5.1)
PROT SERPL-MCNC: 10.8 G/DL (ref 6–8.4)
RBC # BLD AUTO: 3.34 M/UL (ref 4–5.4)
SODIUM SERPL-SCNC: 135 MMOL/L (ref 136–145)
WBC # BLD AUTO: 5.52 K/UL (ref 3.9–12.7)

## 2020-01-14 PROCEDURE — 82085 ASSAY OF ALDOLASE: CPT

## 2020-01-14 PROCEDURE — 86140 C-REACTIVE PROTEIN: CPT

## 2020-01-14 PROCEDURE — 84165 PROTEIN E-PHORESIS SERUM: CPT | Mod: 26,,, | Performed by: PATHOLOGY

## 2020-01-14 PROCEDURE — 84165 PATHOLOGIST INTERPRETATION SPE: ICD-10-PCS | Mod: 26,,, | Performed by: PATHOLOGY

## 2020-01-14 PROCEDURE — 84165 PROTEIN E-PHORESIS SERUM: CPT

## 2020-01-14 PROCEDURE — 86334 IMMUNOFIX E-PHORESIS SERUM: CPT | Mod: 26,,, | Performed by: PATHOLOGY

## 2020-01-14 PROCEDURE — 85652 RBC SED RATE AUTOMATED: CPT

## 2020-01-14 PROCEDURE — 86334 IMMUNOFIX E-PHORESIS SERUM: CPT

## 2020-01-14 PROCEDURE — 99214 PR OFFICE/OUTPT VISIT, EST, LEVL IV, 30-39 MIN: ICD-10-PCS | Mod: S$GLB,,, | Performed by: INTERNAL MEDICINE

## 2020-01-14 PROCEDURE — 86334 PATHOLOGIST INTERPRETATION IFE: ICD-10-PCS | Mod: 26,,, | Performed by: PATHOLOGY

## 2020-01-14 PROCEDURE — 99999 PR PBB SHADOW E&M-EST. PATIENT-LVL III: ICD-10-PCS | Mod: PBBFAC,,, | Performed by: INTERNAL MEDICINE

## 2020-01-14 PROCEDURE — 85025 COMPLETE CBC W/AUTO DIFF WBC: CPT

## 2020-01-14 PROCEDURE — 80053 COMPREHEN METABOLIC PANEL: CPT

## 2020-01-14 PROCEDURE — 3008F PR BODY MASS INDEX (BMI) DOCUMENTED: ICD-10-PCS | Mod: CPTII,S$GLB,, | Performed by: INTERNAL MEDICINE

## 2020-01-14 PROCEDURE — 99999 PR PBB SHADOW E&M-EST. PATIENT-LVL III: CPT | Mod: PBBFAC,,, | Performed by: INTERNAL MEDICINE

## 2020-01-14 PROCEDURE — 82550 ASSAY OF CK (CPK): CPT

## 2020-01-14 PROCEDURE — 3008F BODY MASS INDEX DOCD: CPT | Mod: CPTII,S$GLB,, | Performed by: INTERNAL MEDICINE

## 2020-01-14 PROCEDURE — 36415 COLL VENOUS BLD VENIPUNCTURE: CPT

## 2020-01-14 PROCEDURE — 99214 OFFICE O/P EST MOD 30 MIN: CPT | Mod: S$GLB,,, | Performed by: INTERNAL MEDICINE

## 2020-01-14 PROCEDURE — 82306 VITAMIN D 25 HYDROXY: CPT

## 2020-01-14 RX ORDER — PILOCARPINE HYDROCHLORIDE 7.5 MG/1
7.5 TABLET, FILM COATED ORAL 4 TIMES DAILY
Qty: 120 TABLET | Refills: 11 | Status: SHIPPED | OUTPATIENT
Start: 2020-01-14 | End: 2021-01-22

## 2020-01-14 RX ORDER — HYDROXYCHLOROQUINE SULFATE 200 MG/1
400 TABLET, FILM COATED ORAL DAILY
Qty: 180 TABLET | Refills: 2 | Status: SHIPPED | OUTPATIENT
Start: 2020-01-14 | End: 2021-02-17 | Stop reason: SDUPTHER

## 2020-01-14 ASSESSMENT — ROUTINE ASSESSMENT OF PATIENT INDEX DATA (RAPID3)
MDHAQ FUNCTION SCORE: .4
TOTAL RAPID3 SCORE: 5.94
AM STIFFNESS SCORE: 1, YES
PAIN SCORE: 8.5
FATIGUE SCORE: 8
PATIENT GLOBAL ASSESSMENT SCORE: 8
PSYCHOLOGICAL DISTRESS SCORE: 2.2

## 2020-01-14 NOTE — PATIENT INSTRUCTIONS
Read on trochanteric bursitis.     Let us know if you want it injected.     Call [phone] for an appointment with Dr. Efrain Wheat.

## 2020-01-15 LAB
ALBUMIN SERPL ELPH-MCNC: 3.75 G/DL (ref 3.35–5.55)
ALDOLASE SERPL-CCNC: 2.7 U/L (ref 1.2–7.6)
ALPHA1 GLOB SERPL ELPH-MCNC: 0.26 G/DL (ref 0.17–0.41)
ALPHA2 GLOB SERPL ELPH-MCNC: 0.74 G/DL (ref 0.43–0.99)
B-GLOBULIN SERPL ELPH-MCNC: 1.32 G/DL (ref 0.5–1.1)
GAMMA GLOB SERPL ELPH-MCNC: 4.43 G/DL (ref 0.67–1.58)
INTERPRETATION SERPL IFE-IMP: NORMAL
PATHOLOGIST INTERPRETATION IFE: NORMAL
PATHOLOGIST INTERPRETATION SPE: NORMAL
PROT SERPL-MCNC: 10.5 G/DL (ref 6–8.4)

## 2020-03-13 DIAGNOSIS — Z79.899 ENCOUNTER FOR LONG-TERM (CURRENT) USE OF HIGH-RISK MEDICATION: Primary | ICD-10-CM

## 2020-03-13 DIAGNOSIS — K21.9 GASTROESOPHAGEAL REFLUX DISEASE WITHOUT ESOPHAGITIS: ICD-10-CM

## 2020-03-13 DIAGNOSIS — K59.09 CHRONIC CONSTIPATION: ICD-10-CM

## 2020-03-13 RX ORDER — OMEPRAZOLE 40 MG/1
40 CAPSULE, DELAYED RELEASE ORAL EVERY MORNING
Qty: 90 CAPSULE | Refills: 3 | Status: SHIPPED | OUTPATIENT
Start: 2020-03-13 | End: 2023-05-02 | Stop reason: SDUPTHER

## 2020-03-13 NOTE — PROGRESS NOTES
Pt called to cancel appt d/t being immunocompromised and Covid 19 cases at [facility]. Will refill medications for now. She states she will return for f/u after the virus spread has decreased.

## 2020-03-16 RX ORDER — ALBUTEROL SULFATE 90 UG/1
AEROSOL, METERED RESPIRATORY (INHALATION)
Qty: 8.5 G | Refills: 11 | Status: SHIPPED | OUTPATIENT
Start: 2020-03-16 | End: 2021-02-17

## 2020-04-06 NOTE — TELEPHONE ENCOUNTER
----- Message from Juani Everett LPN sent at 3/19/2020  1:51 PM CDT -----  Contact: Kaushik Martins at  [phone] ext [phone]       ----- Message -----  From: Isabel Muniz  Sent: 3/19/2020  11:10 AM CDT  To: [facility] Staff    Rx Refill/Request     Is this a Refill or New Rx:  refill  Rx Name and Strength:  Hizentra  Preferred Pharmacy with phone number: Kaushik at [phone] ext [phone]  Communication Preference:  Additional Information: Please call

## 2020-04-06 NOTE — TELEPHONE ENCOUNTER
Can you print out a new rx for Hizentra and I will fax back to [facility]. Current PA expires in August but rx is expiring soon.

## 2020-05-26 ENCOUNTER — PATIENT MESSAGE (OUTPATIENT)
Dept: RHEUMATOLOGY | Facility: CLINIC | Age: 45
End: 2020-05-26

## 2020-05-26 ENCOUNTER — PATIENT MESSAGE (OUTPATIENT)
Dept: FAMILY MEDICINE | Facility: CLINIC | Age: 45
End: 2020-05-26

## 2020-05-28 ENCOUNTER — OFFICE VISIT (OUTPATIENT)
Dept: FAMILY MEDICINE | Facility: CLINIC | Age: 45
End: 2020-05-28
Payer: COMMERCIAL

## 2020-05-28 VITALS
SYSTOLIC BLOOD PRESSURE: 112 MMHG | DIASTOLIC BLOOD PRESSURE: 77 MMHG | HEIGHT: 64 IN | WEIGHT: 198 LBS | OXYGEN SATURATION: 96 % | BODY MASS INDEX: 33.8 KG/M2 | HEART RATE: 81 BPM | TEMPERATURE: 97 F

## 2020-05-28 DIAGNOSIS — J01.90 ACUTE BACTERIAL SINUSITIS: Primary | ICD-10-CM

## 2020-05-28 DIAGNOSIS — B96.89 ACUTE BACTERIAL SINUSITIS: Primary | ICD-10-CM

## 2020-05-28 PROCEDURE — 99214 PR OFFICE/OUTPT VISIT, EST, LEVL IV, 30-39 MIN: ICD-10-PCS | Mod: S$GLB,,, | Performed by: PHYSICIAN ASSISTANT

## 2020-05-28 PROCEDURE — 99999 PR PBB SHADOW E&M-EST. PATIENT-LVL III: CPT | Mod: PBBFAC,,, | Performed by: PHYSICIAN ASSISTANT

## 2020-05-28 PROCEDURE — 3008F PR BODY MASS INDEX (BMI) DOCUMENTED: ICD-10-PCS | Mod: CPTII,S$GLB,, | Performed by: PHYSICIAN ASSISTANT

## 2020-05-28 PROCEDURE — 99999 PR PBB SHADOW E&M-EST. PATIENT-LVL III: ICD-10-PCS | Mod: PBBFAC,,, | Performed by: PHYSICIAN ASSISTANT

## 2020-05-28 PROCEDURE — 99214 OFFICE O/P EST MOD 30 MIN: CPT | Mod: S$GLB,,, | Performed by: PHYSICIAN ASSISTANT

## 2020-05-28 PROCEDURE — 3008F BODY MASS INDEX DOCD: CPT | Mod: CPTII,S$GLB,, | Performed by: PHYSICIAN ASSISTANT

## 2020-05-28 RX ORDER — FLUTICASONE PROPIONATE 50 MCG
2 SPRAY, SUSPENSION (ML) NASAL DAILY
Qty: 18 ML | Refills: 0 | Status: SHIPPED | OUTPATIENT
Start: 2020-05-28 | End: 2020-06-27

## 2020-05-28 RX ORDER — AMOXICILLIN AND CLAVULANATE POTASSIUM 875; 125 MG/1; MG/1
1 TABLET, FILM COATED ORAL 2 TIMES DAILY
Qty: 20 TABLET | Refills: 0 | Status: SHIPPED | OUTPATIENT
Start: 2020-05-28 | End: 2020-06-07
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
## 2020-05-28 NOTE — PATIENT INSTRUCTIONS

## 2020-06-10 ENCOUNTER — OFFICE VISIT (OUTPATIENT)
Dept: OPTOMETRY | Facility: CLINIC | Age: 45
End: 2020-06-10
Payer: COMMERCIAL

## 2020-06-10 DIAGNOSIS — H52.11 MYOPIA OF RIGHT EYE: ICD-10-CM

## 2020-06-10 DIAGNOSIS — M35.00 SJOGREN'S SYNDROME, WITH UNSPECIFIED ORGAN INVOLVEMENT: Primary | ICD-10-CM

## 2020-06-10 DIAGNOSIS — H52.4 PRESBYOPIA OF BOTH EYES: ICD-10-CM

## 2020-06-10 DIAGNOSIS — Z79.899 LONG-TERM USE OF PLAQUENIL: ICD-10-CM

## 2020-06-10 DIAGNOSIS — H52.202 MYOPIA OF LEFT EYE WITH ASTIGMATISM: ICD-10-CM

## 2020-06-10 DIAGNOSIS — H52.12 MYOPIA OF LEFT EYE WITH ASTIGMATISM: ICD-10-CM

## 2020-06-10 DIAGNOSIS — H26.9 CORTICAL CATARACT OF LEFT EYE: ICD-10-CM

## 2020-06-10 DIAGNOSIS — H04.123 DRY EYE SYNDROME, BILATERAL: ICD-10-CM

## 2020-06-10 PROCEDURE — 92015 PR REFRACTION: ICD-10-PCS | Mod: S$GLB,,, | Performed by: OPTOMETRIST

## 2020-06-10 PROCEDURE — 99999 PR PBB SHADOW E&M-EST. PATIENT-LVL III: ICD-10-PCS | Mod: PBBFAC,,, | Performed by: OPTOMETRIST

## 2020-06-10 PROCEDURE — 92134 CPTRZ OPH DX IMG PST SGM RTA: CPT | Mod: S$GLB,,, | Performed by: OPTOMETRIST

## 2020-06-10 PROCEDURE — 92014 COMPRE OPH EXAM EST PT 1/>: CPT | Mod: S$GLB,,, | Performed by: OPTOMETRIST

## 2020-06-10 PROCEDURE — 92015 DETERMINE REFRACTIVE STATE: CPT | Mod: S$GLB,,, | Performed by: OPTOMETRIST

## 2020-06-10 PROCEDURE — 92014 PR EYE EXAM, EST PATIENT,COMPREHESV: ICD-10-PCS | Mod: S$GLB,,, | Performed by: OPTOMETRIST

## 2020-06-10 PROCEDURE — 99999 PR PBB SHADOW E&M-EST. PATIENT-LVL III: CPT | Mod: PBBFAC,,, | Performed by: OPTOMETRIST

## 2020-06-10 PROCEDURE — 92134 POSTERIOR SEGMENT OCT RETINA (OCULAR COHERENCE TOMOGRAPHY)-BOTH EYES: ICD-10-PCS | Mod: S$GLB,,, | Performed by: OPTOMETRIST

## 2020-06-10 RX ORDER — SODIUM FLUORIDE 0.9 MG/ML
MOUTHWASH DENTAL
Status: ON HOLD | COMMUNITY
Start: 2018-03-29 | End: 2024-03-21

## 2020-06-10 RX ORDER — CYCLOSPORINE 0.5 MG/ML
1 EMULSION OPHTHALMIC 2 TIMES DAILY
Qty: 60 VIAL | Refills: 5 | Status: SHIPPED | OUTPATIENT
Start: 2020-06-10 | End: 2021-07-18
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
## 2020-06-10 NOTE — PATIENT INSTRUCTIONS
History of Sjogren's syndrome with (presumably secondary) bilateral dry eye.  Ms. Hollins has tried Xiidra ophthalmic solution, but found it to be of little benefit in relieving dry eye symptoms, and she complains of burning upon instillation.  Suggest trial with Reastsis ophthalmic emulsion.  Issued Rx for Restasis Ophthalmic Emulsion - instill one drop into both eyes two times per day.  In addition, continue to instill a medium viscosity artificial tear (Systane or Optive) as needed in both eyes throughout the day.  Also, if needed, suggest use of either Genteal gel or Systane ointment in both eyes at bedtime.    Early peripheral cortical cataract in the left eye.  Not impacting vision.  Monitor only.    Plaquenil patient (400 mg per day) for past five years.  No evidence of Plaquenil toxicity.  No evidence of bulls eye maculopathy in either eye.  Normal color vision in each eye,  No evidence of abnormality of OCT of retina at macula in either eye (done today).  Suggest Ms. Hollins return when convenient for Richter Visual Field (HVF) test (10-2) and no-charge visit with me (Dr. Wheat) on the same date.  Assistant to call to schedule.    Myopia (nearsightedness) in the right eye, and myopia with astigmatism in the left eye.  Good best-corrected VA in each eyes.  Presbyopia consistent with age.  New spectacle lens Rx issued for use as desired.    Repeat general eye exam and refraction in one year.

## 2020-06-10 NOTE — LETTER
June 12, 2020      Susan Bustos MD  [address]  [place] [address]           [facility]  [address]  [facility] [address]  Phone: [phone]  Fax: [phone]          Patient: Manasa Hollins   MR Number: [id number]   YOB: 1975   Date of Visit: 6/10/2020       Dear Dr. Susan Bustos:    Thank you for referring Manasa Hollins to me for evaluation. Attached you will find relevant portions of my assessment and plan of care.    If you have questions, please do not hesitate to call me. I look forward to following Manasa Hollins along with you.    Sincerely,    Efrain Wheat, OD    Enclosure  CC:  No Recipients    If you would like to receive this communication electronically, please contact [email or web address] or [phone] to request more information on [email or web address] Link access.    For providers and/or their staff who would like to refer a patient to [facility], please contact us through our one-stop-shop provider referral line, [facility], at [phone].    If you feel you have received this communication in error or would no longer like to receive these types of communications, please e-mail [email or web address]

## 2020-06-11 ENCOUNTER — TELEPHONE (OUTPATIENT)
Dept: RHEUMATOLOGY | Facility: CLINIC | Age: 45
End: 2020-06-11

## 2020-07-23 ENCOUNTER — LAB VISIT (OUTPATIENT)
Dept: LAB | Facility: HOSPITAL | Age: 45
End: 2020-07-23
Attending: ALLERGY & IMMUNOLOGY
Payer: COMMERCIAL

## 2020-07-23 ENCOUNTER — OFFICE VISIT (OUTPATIENT)
Dept: ALLERGY | Facility: CLINIC | Age: 45
End: 2020-07-23
Payer: COMMERCIAL

## 2020-07-23 VITALS — WEIGHT: 200.38 LBS | BODY MASS INDEX: 34.21 KG/M2 | HEIGHT: 64 IN

## 2020-07-23 DIAGNOSIS — D80.6 ANTI-POLYSACCHARIDE ANTIBODY DEFICIENCY: ICD-10-CM

## 2020-07-23 DIAGNOSIS — J32.9 SINUSITIS, UNSPECIFIED CHRONICITY, UNSPECIFIED LOCATION: ICD-10-CM

## 2020-07-23 DIAGNOSIS — D80.6 ANTI-POLYSACCHARIDE ANTIBODY DEFICIENCY: Primary | ICD-10-CM

## 2020-07-23 LAB
IGA SERPL-MCNC: 506 MG/DL (ref 40–350)
IGG SERPL-MCNC: 4831 MG/DL (ref 650–1600)
IGM SERPL-MCNC: 40 MG/DL (ref 50–300)

## 2020-07-23 PROCEDURE — 99999 PR PBB SHADOW E&M-EST. PATIENT-LVL IV: ICD-10-PCS | Mod: PBBFAC,,, | Performed by: ALLERGY & IMMUNOLOGY

## 2020-07-23 PROCEDURE — 99214 PR OFFICE/OUTPT VISIT, EST, LEVL IV, 30-39 MIN: ICD-10-PCS | Mod: S$GLB,,, | Performed by: ALLERGY & IMMUNOLOGY

## 2020-07-23 PROCEDURE — 99214 OFFICE O/P EST MOD 30 MIN: CPT | Mod: S$GLB,,, | Performed by: ALLERGY & IMMUNOLOGY

## 2020-07-23 PROCEDURE — 3008F BODY MASS INDEX DOCD: CPT | Mod: CPTII,S$GLB,, | Performed by: ALLERGY & IMMUNOLOGY

## 2020-07-23 PROCEDURE — 36415 COLL VENOUS BLD VENIPUNCTURE: CPT

## 2020-07-23 PROCEDURE — 3008F PR BODY MASS INDEX (BMI) DOCUMENTED: ICD-10-PCS | Mod: CPTII,S$GLB,, | Performed by: ALLERGY & IMMUNOLOGY

## 2020-07-23 PROCEDURE — 99999 PR PBB SHADOW E&M-EST. PATIENT-LVL IV: CPT | Mod: PBBFAC,,, | Performed by: ALLERGY & IMMUNOLOGY

## 2020-07-23 PROCEDURE — 82784 ASSAY IGA/IGD/IGG/IGM EACH: CPT | Mod: 59

## 2020-07-23 RX ORDER — CLARITHROMYCIN 500 MG/1
500 TABLET, FILM COATED ORAL EVERY 12 HOURS
Qty: 28 TABLET | Refills: 0 | Status: SHIPPED | OUTPATIENT
Start: 2020-07-23 | End: 2020-08-06
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
## 2020-08-11 ENCOUNTER — CLINICAL SUPPORT (OUTPATIENT)
Dept: OPHTHALMOLOGY | Facility: CLINIC | Age: 45
End: 2020-08-11
Payer: COMMERCIAL

## 2020-08-11 ENCOUNTER — OFFICE VISIT (OUTPATIENT)
Dept: OPTOMETRY | Facility: CLINIC | Age: 45
End: 2020-08-11
Payer: COMMERCIAL

## 2020-08-11 DIAGNOSIS — Z79.899 LONG-TERM USE OF PLAQUENIL: ICD-10-CM

## 2020-08-11 DIAGNOSIS — M35.00 SJOGREN'S SYNDROME, WITH UNSPECIFIED ORGAN INVOLVEMENT: Primary | ICD-10-CM

## 2020-08-11 DIAGNOSIS — Z13.5 SCREENING FOR EYE CONDITION: ICD-10-CM

## 2020-08-11 DIAGNOSIS — M35.00 SJOGREN'S SYNDROME, WITH UNSPECIFIED ORGAN INVOLVEMENT: ICD-10-CM

## 2020-08-11 PROCEDURE — 99499 UNLISTED E&M SERVICE: CPT | Mod: S$GLB,,, | Performed by: OPTOMETRIST

## 2020-08-11 PROCEDURE — 99999 PR PBB SHADOW E&M-EST. PATIENT-LVL IV: CPT | Mod: PBBFAC,,, | Performed by: OPTOMETRIST

## 2020-08-11 PROCEDURE — 99499 NO LOS: ICD-10-PCS | Mod: S$GLB,,, | Performed by: OPTOMETRIST

## 2020-08-11 PROCEDURE — 99999 PR PBB SHADOW E&M-EST. PATIENT-LVL IV: ICD-10-PCS | Mod: PBBFAC,,, | Performed by: OPTOMETRIST

## 2020-08-11 NOTE — PROGRESS NOTES
HPI     Patient in for progress check.  10-2 plaquenil ck.  Check   Being followed for (diagnosis):       Date last seen:  06/10/2020    Doctor last seen:       Prescribed eye medications(s) using: Reastsis     OTC eye medication(s) using:  systane prn ,lubricating ointment     Signs/symptoms of condition resolved/better/stable/worse?:              Last edited by Carroll Anthony MA on 8/11/2020  8:34 AM. (History)            Assessment /Plan     For exam results, see Encounter Report.    1. Sjogren's syndrome, with unspecified organ involvement  Richter Visual Field - OU - Extended - Both Eyes   2. Long-term use of Plaquenil  Richter Visual Field - OU - Extended - Both Eyes   3. Screening for eye condition                    History of Sjogrens Syndrome  Plaquenil patient (400 mg per day) for past five years.  No evidence of Plaquenil toxicity.  No evidence of bulls eye maculopathy in either eye per dilated fundus exam done on previous visit.   Normal color vision in each eye at last visit.   No evidence of abnormality of OCT of retina at macula in either eye (done on previous visit)    Richter Visual Field (HVF) test (10-2) done today   Normal central 10° visual field in each eye.  No evidence of Plaquenil-related visual field defect(s) in either eye.  Advised Ms. Hollins of the above.   Okay to continue to take Plaquenil as directed by rheumatologist (Dr. Bustos).  Return in six months for recheck per Plaquenil protocol - or one year, if Dr. Bustos approves.  Repeat HVF test (10-2) in June 2021 at the time of annual general eye examination

## 2020-08-11 NOTE — PATIENT INSTRUCTIONS
History of Sjogrens Syndrome  Plaquenil patient (400 mg per day) for past five years.  No evidence of Plaquenil toxicity.  No evidence of bulls eye maculopathy in either eye per dilated fundus exam done on previous visit.   Normal color vision in each eye at last visit.   No evidence of abnormality of OCT of retina at macula in either eye (done on previous visit)    Richter Visual Field (HVF) test (10-2) done today   Normal central 10° visual field in each eye.  No evidence of Plaquenil-related visual field defect(s) in either eye.  Advised Ms. Hollins of the above.   Okay to continue to take Plaquenil as directed by rheumatologist (Dr. Bustos).  Return in six months for recheck per Plaquenil protocol - or one year, if Dr. Bustos approves.  Repeat HVF test (10-2) in June 2021 at the time of annual general eye examination

## 2020-11-09 ENCOUNTER — TELEPHONE (OUTPATIENT)
Dept: ALLERGY | Facility: CLINIC | Age: 45
End: 2020-11-09

## 2020-11-09 NOTE — TELEPHONE ENCOUNTER
"Received a fax from [facility] stating that the pharmacy has been unable to reach patient to schedule delivery.  Called patient and was informed by her that due to COVID, she lost her insurance and stated that she explained that to Pamela with [facility]. Patient is eligible for medicaid but does not have her card yet. I sent an e-mail to Shabnam Montaño to see what could be done to ensure patient can continue with her infuisons.      "Pedro Haque   I received a fax from [employer] stating that [facility] was going to d/c patient due to patient being unreachable. Patient is Manasa Hollins  1975. I contacted Mrs. Hollins and she informed me that she spoke to a Pamela with [facility] to let her know that she lost her insurance. Mrs. Hollins stated that Pamela asked her to provide W2 forms and a certified letter from her  to [facility] so she can continue with Hizentra. Mrs. Hollins stated that she applied for Medicaid but doesn't have her card yet. This is the first time that I am hearing about any of this from as the patient did not reach out to us to let us know she no longer had insurance. Is there anything we can do to see if patient can continue with her Hizentra until her Medicaid card comes in. The last infusion was 20.   Thanks   Corinne"    "

## 2021-02-03 ENCOUNTER — OFFICE VISIT (OUTPATIENT)
Dept: ALLERGY | Facility: CLINIC | Age: 46
End: 2021-02-03
Payer: COMMERCIAL

## 2021-02-03 VITALS — BODY MASS INDEX: 32.11 KG/M2 | HEIGHT: 64 IN | WEIGHT: 188.06 LBS

## 2021-02-03 DIAGNOSIS — D80.6 ANTI-POLYSACCHARIDE ANTIBODY DEFICIENCY: Primary | ICD-10-CM

## 2021-02-03 DIAGNOSIS — D80.3 IGG2 SUBCLASS DEFICIENCY: ICD-10-CM

## 2021-02-03 PROCEDURE — 1125F AMNT PAIN NOTED PAIN PRSNT: CPT | Mod: S$GLB,,, | Performed by: ALLERGY & IMMUNOLOGY

## 2021-02-03 PROCEDURE — 1125F PR PAIN SEVERITY QUANTIFIED, PAIN PRESENT: ICD-10-PCS | Mod: S$GLB,,, | Performed by: ALLERGY & IMMUNOLOGY

## 2021-02-03 PROCEDURE — 99999 PR PBB SHADOW E&M-EST. PATIENT-LVL IV: CPT | Mod: PBBFAC,,, | Performed by: ALLERGY & IMMUNOLOGY

## 2021-02-03 PROCEDURE — 99214 PR OFFICE/OUTPT VISIT, EST, LEVL IV, 30-39 MIN: ICD-10-PCS | Mod: S$GLB,,, | Performed by: ALLERGY & IMMUNOLOGY

## 2021-02-03 PROCEDURE — 99214 OFFICE O/P EST MOD 30 MIN: CPT | Mod: S$GLB,,, | Performed by: ALLERGY & IMMUNOLOGY

## 2021-02-03 PROCEDURE — 3008F BODY MASS INDEX DOCD: CPT | Mod: CPTII,S$GLB,, | Performed by: ALLERGY & IMMUNOLOGY

## 2021-02-03 PROCEDURE — 99999 PR PBB SHADOW E&M-EST. PATIENT-LVL IV: ICD-10-PCS | Mod: PBBFAC,,, | Performed by: ALLERGY & IMMUNOLOGY

## 2021-02-03 PROCEDURE — 3008F PR BODY MASS INDEX (BMI) DOCUMENTED: ICD-10-PCS | Mod: CPTII,S$GLB,, | Performed by: ALLERGY & IMMUNOLOGY

## 2021-02-03 RX ORDER — FLUTICASONE PROPIONATE 50 MCG
2 SPRAY, SUSPENSION (ML) NASAL 2 TIMES DAILY
Qty: 32 G | Refills: 11 | Status: SHIPPED | OUTPATIENT
Start: 2021-02-03 | End: 2022-01-20

## 2021-02-03 RX ORDER — HUMAN IMMUNOGLOBULIN G 0.2 G/ML
12 LIQUID SUBCUTANEOUS WEEKLY
Qty: 240 ML | Refills: 11 | Status: SHIPPED | OUTPATIENT
Start: 2021-02-03 | End: 2021-02-17

## 2021-02-03 RX ORDER — TRIAMCINOLONE ACETONIDE 1 MG/G
OINTMENT TOPICAL 2 TIMES DAILY
Qty: 80 G | Refills: 3 | Status: SHIPPED | OUTPATIENT
Start: 2021-02-03 | End: 2022-09-26 | Stop reason: SDUPTHER

## 2021-02-03 RX ORDER — AZELASTINE 1 MG/ML
2 SPRAY, METERED NASAL 2 TIMES DAILY
Qty: 30 ML | Refills: 11 | Status: SHIPPED | OUTPATIENT
Start: 2021-02-03 | End: 2022-01-20

## 2021-02-17 ENCOUNTER — HOSPITAL ENCOUNTER (OUTPATIENT)
Dept: RADIOLOGY | Facility: HOSPITAL | Age: 46
Discharge: HOME | End: 2021-02-17
Attending: INTERNAL MEDICINE
Payer: COMMERCIAL

## 2021-02-17 ENCOUNTER — OFFICE VISIT (OUTPATIENT)
Dept: RHEUMATOLOGY | Facility: CLINIC | Age: 46
End: 2021-02-17
Payer: COMMERCIAL

## 2021-02-17 VITALS
BODY MASS INDEX: 32.54 KG/M2 | SYSTOLIC BLOOD PRESSURE: 102 MMHG | WEIGHT: 189.63 LBS | DIASTOLIC BLOOD PRESSURE: 64 MMHG | HEART RATE: 86 BPM

## 2021-02-17 DIAGNOSIS — M25.50 PAIN IN JOINT INVOLVING MULTIPLE SITES: ICD-10-CM

## 2021-02-17 DIAGNOSIS — D80.3 IGG2 DEFICIENCY: ICD-10-CM

## 2021-02-17 DIAGNOSIS — D64.9 ANEMIA, UNSPECIFIED TYPE: ICD-10-CM

## 2021-02-17 DIAGNOSIS — M35.09 SJOGREN'S SYNDROME WITH OTHER ORGAN INVOLVEMENT: Primary | ICD-10-CM

## 2021-02-17 DIAGNOSIS — M77.12 LATERAL EPICONDYLITIS OF LEFT ELBOW: ICD-10-CM

## 2021-02-17 DIAGNOSIS — K11.1 PAROTID GLAND ENLARGEMENT: ICD-10-CM

## 2021-02-17 DIAGNOSIS — Z55.9 EDUCATIONAL CIRCUMSTANCE: ICD-10-CM

## 2021-02-17 DIAGNOSIS — E66.9 OBESITY (BMI 30.0-34.9): ICD-10-CM

## 2021-02-17 DIAGNOSIS — Z79.899 LONG-TERM USE OF HYDROXYCHLOROQUINE: ICD-10-CM

## 2021-02-17 PROCEDURE — 71046 XR CHEST PA AND LATERAL: ICD-10-PCS | Mod: 26,,, | Performed by: RADIOLOGY

## 2021-02-17 PROCEDURE — 99214 OFFICE O/P EST MOD 30 MIN: CPT | Mod: S$GLB,,, | Performed by: INTERNAL MEDICINE

## 2021-02-17 PROCEDURE — 73562 X-RAY EXAM OF KNEE 3: CPT | Mod: TC,50

## 2021-02-17 PROCEDURE — 3008F PR BODY MASS INDEX (BMI) DOCUMENTED: ICD-10-PCS | Mod: CPTII,S$GLB,, | Performed by: INTERNAL MEDICINE

## 2021-02-17 PROCEDURE — 99214 PR OFFICE/OUTPT VISIT, EST, LEVL IV, 30-39 MIN: ICD-10-PCS | Mod: S$GLB,,, | Performed by: INTERNAL MEDICINE

## 2021-02-17 PROCEDURE — 71046 X-RAY EXAM CHEST 2 VIEWS: CPT | Mod: 26,,, | Performed by: RADIOLOGY

## 2021-02-17 PROCEDURE — 71046 X-RAY EXAM CHEST 2 VIEWS: CPT | Mod: TC

## 2021-02-17 PROCEDURE — 1125F PR PAIN SEVERITY QUANTIFIED, PAIN PRESENT: ICD-10-PCS | Mod: S$GLB,,, | Performed by: INTERNAL MEDICINE

## 2021-02-17 PROCEDURE — 73562 X-RAY EXAM OF KNEE 3: CPT | Mod: 26,,, | Performed by: RADIOLOGY

## 2021-02-17 PROCEDURE — 73521 X-RAY EXAM HIPS BI 2 VIEWS: CPT | Mod: 26,,, | Performed by: RADIOLOGY

## 2021-02-17 PROCEDURE — 73521 X-RAY EXAM HIPS BI 2 VIEWS: CPT | Mod: TC

## 2021-02-17 PROCEDURE — 73521 XR HIPS BILATERAL 2 VIEW INCL AP PELVIS: ICD-10-PCS | Mod: 26,,, | Performed by: RADIOLOGY

## 2021-02-17 PROCEDURE — 73562 XR KNEE 3 VIEW BILATERAL: ICD-10-PCS | Mod: 26,,, | Performed by: RADIOLOGY

## 2021-02-17 PROCEDURE — 99999 PR PBB SHADOW E&M-EST. PATIENT-LVL V: ICD-10-PCS | Mod: PBBFAC,,, | Performed by: INTERNAL MEDICINE

## 2021-02-17 PROCEDURE — 99999 PR PBB SHADOW E&M-EST. PATIENT-LVL V: CPT | Mod: PBBFAC,,, | Performed by: INTERNAL MEDICINE

## 2021-02-17 PROCEDURE — 3008F BODY MASS INDEX DOCD: CPT | Mod: CPTII,S$GLB,, | Performed by: INTERNAL MEDICINE

## 2021-02-17 PROCEDURE — 1125F AMNT PAIN NOTED PAIN PRSNT: CPT | Mod: S$GLB,,, | Performed by: INTERNAL MEDICINE

## 2021-02-17 RX ORDER — PILOCARPINE HYDROCHLORIDE 7.5 MG/1
7.5 TABLET, FILM COATED ORAL 4 TIMES DAILY
Qty: 360 TABLET | Refills: 0 | Status: SHIPPED | OUTPATIENT
Start: 2021-02-17 | End: 2021-08-20

## 2021-02-17 RX ORDER — HYDROXYCHLOROQUINE SULFATE 200 MG/1
400 TABLET, FILM COATED ORAL DAILY
Qty: 180 TABLET | Refills: 2 | Status: SHIPPED | OUTPATIENT
Start: 2021-02-17 | End: 2021-10-12 | Stop reason: SDUPTHER

## 2021-02-17 SDOH — SOCIAL DETERMINANTS OF HEALTH (SDOH): PROBLEMS RELATED TO EDUCATION AND LITERACY, UNSPECIFIED: Z55.9

## 2021-02-17 ASSESSMENT — ROUTINE ASSESSMENT OF PATIENT INDEX DATA (RAPID3)
PATIENT GLOBAL ASSESSMENT SCORE: 8.5
PSYCHOLOGICAL DISTRESS SCORE: 4.4
TOTAL RAPID3 SCORE: 5.11
MDHAQ FUNCTION SCORE: 0.7
PAIN SCORE: 4.5
FATIGUE SCORE: 8.5
AM STIFFNESS SCORE: 1, YES

## 2021-02-22 DIAGNOSIS — D80.6 ANTI-POLYSACCHARIDE ANTIBODY DEFICIENCY: Primary | ICD-10-CM

## 2021-02-22 RX ORDER — HUMAN IMMUNOGLOBULIN G 0.2 G/ML
12 LIQUID SUBCUTANEOUS
Qty: 240 ML | Refills: 11 | Status: SHIPPED | OUTPATIENT
Start: 2021-02-22 | End: 2021-07-01 | Stop reason: SDUPTHER

## 2021-02-23 ENCOUNTER — PATIENT MESSAGE (OUTPATIENT)
Dept: RHEUMATOLOGY | Facility: CLINIC | Age: 46
End: 2021-02-23

## 2021-04-08 ENCOUNTER — PATIENT MESSAGE (OUTPATIENT)
Dept: ALLERGY | Facility: CLINIC | Age: 46
End: 2021-04-08

## 2021-05-26 ENCOUNTER — OFFICE VISIT (OUTPATIENT)
Dept: URGENT CARE | Facility: CLINIC | Age: 46
End: 2021-05-26
Payer: COMMERCIAL

## 2021-05-26 VITALS
DIASTOLIC BLOOD PRESSURE: 68 MMHG | WEIGHT: 189 LBS | OXYGEN SATURATION: 95 % | BODY MASS INDEX: 32.27 KG/M2 | SYSTOLIC BLOOD PRESSURE: 115 MMHG | RESPIRATION RATE: 18 BRPM | TEMPERATURE: 98 F | HEIGHT: 64 IN | HEART RATE: 95 BPM

## 2021-05-26 DIAGNOSIS — M54.41 ACUTE RIGHT-SIDED LOW BACK PAIN WITH RIGHT-SIDED SCIATICA: ICD-10-CM

## 2021-05-26 DIAGNOSIS — S79.911A HIP INJURY, RIGHT, INITIAL ENCOUNTER: ICD-10-CM
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
## 2021-05-26 PROCEDURE — 72100 XR LUMBAR SPINE 2 OR 3 VIEWS: ICD-10-PCS | Mod: S$GLB,,, | Performed by: RADIOLOGY

## 2021-05-26 PROCEDURE — 3008F PR BODY MASS INDEX (BMI) DOCUMENTED: ICD-10-PCS | Mod: CPTII,S$GLB,, | Performed by: STUDENT IN AN ORGANIZED HEALTH CARE EDUCATION/TRAINING PROGRAM

## 2021-05-26 PROCEDURE — 73502 X-RAY EXAM HIP UNI 2-3 VIEWS: CPT | Mod: RT,S$GLB,, | Performed by: RADIOLOGY

## 2021-05-26 PROCEDURE — 96372 THER/PROPH/DIAG INJ SC/IM: CPT | Mod: S$GLB,,, | Performed by: STUDENT IN AN ORGANIZED HEALTH CARE EDUCATION/TRAINING PROGRAM

## 2021-05-26 PROCEDURE — 96372 PR INJECTION,THERAP/PROPH/DIAG2ST, IM OR SUBCUT: ICD-10-PCS | Mod: S$GLB,,, | Performed by: STUDENT IN AN ORGANIZED HEALTH CARE EDUCATION/TRAINING PROGRAM

## 2021-05-26 PROCEDURE — 99214 OFFICE O/P EST MOD 30 MIN: CPT | Mod: 25,S$GLB,, | Performed by: STUDENT IN AN ORGANIZED HEALTH CARE EDUCATION/TRAINING PROGRAM

## 2021-05-26 PROCEDURE — 72100 X-RAY EXAM L-S SPINE 2/3 VWS: CPT | Mod: S$GLB,,, | Performed by: RADIOLOGY

## 2021-05-26 PROCEDURE — 73502 XR HIP 2 VIEW RIGHT: ICD-10-PCS | Mod: RT,S$GLB,, | Performed by: RADIOLOGY

## 2021-05-26 PROCEDURE — 99214 PR OFFICE/OUTPT VISIT, EST, LEVL IV, 30-39 MIN: ICD-10-PCS | Mod: 25,S$GLB,, | Performed by: STUDENT IN AN ORGANIZED HEALTH CARE EDUCATION/TRAINING PROGRAM

## 2021-05-26 PROCEDURE — 3008F BODY MASS INDEX DOCD: CPT | Mod: CPTII,S$GLB,, | Performed by: STUDENT IN AN ORGANIZED HEALTH CARE EDUCATION/TRAINING PROGRAM

## 2021-05-26 RX ORDER — IBUPROFEN 600 MG/1
600 TABLET ORAL EVERY 8 HOURS PRN
Qty: 15 TABLET | Refills: 0 | Status: SHIPPED | OUTPATIENT
Start: 2021-05-26 | End: 2021-05-31

## 2021-05-26 RX ORDER — KETOROLAC TROMETHAMINE 30 MG/ML
30 INJECTION, SOLUTION INTRAMUSCULAR; INTRAVENOUS
Status: COMPLETED | OUTPATIENT
Start: 2021-05-26 | End: 2021-05-26

## 2021-05-26 RX ORDER — CYCLOBENZAPRINE HCL 5 MG
5-10 TABLET ORAL 3 TIMES DAILY PRN
Qty: 15 TABLET | Refills: 0 | Status: SHIPPED | OUTPATIENT
Start: 2021-05-26 | End: 2021-05-31

## 2021-05-26 RX ADMIN — KETOROLAC TROMETHAMINE 30 MG: 30 INJECTION, SOLUTION INTRAMUSCULAR; INTRAVENOUS at 03:05

## 2021-06-16 ENCOUNTER — PATIENT MESSAGE (OUTPATIENT)
Dept: ALLERGY | Facility: CLINIC | Age: 46
End: 2021-06-16

## 2021-06-30 ENCOUNTER — PATIENT MESSAGE (OUTPATIENT)
Dept: ALLERGY | Facility: CLINIC | Age: 46
End: 2021-06-30

## 2021-06-30 DIAGNOSIS — D80.6 ANTI-POLYSACCHARIDE ANTIBODY DEFICIENCY: ICD-10-CM

## 2021-07-01 RX ORDER — HUMAN IMMUNOGLOBULIN G 0.2 G/ML
12 LIQUID SUBCUTANEOUS
Qty: 240 ML | Refills: 11 | Status: SHIPPED | OUTPATIENT
Start: 2021-07-01 | End: 2021-10-12

## 2021-07-16 ENCOUNTER — PATIENT MESSAGE (OUTPATIENT)
Dept: ALLERGY | Facility: CLINIC | Age: 46
End: 2021-07-16

## 2021-09-20 ENCOUNTER — TELEPHONE (OUTPATIENT)
Dept: FAMILY MEDICINE | Facility: CLINIC | Age: 46
End: 2021-09-20

## 2021-09-21 ENCOUNTER — OFFICE VISIT (OUTPATIENT)
Dept: FAMILY MEDICINE | Facility: CLINIC | Age: 46
End: 2021-09-21
Payer: COMMERCIAL

## 2021-09-21 VITALS
OXYGEN SATURATION: 99 % | TEMPERATURE: 98 F | RESPIRATION RATE: 18 BRPM | HEART RATE: 78 BPM | DIASTOLIC BLOOD PRESSURE: 70 MMHG | SYSTOLIC BLOOD PRESSURE: 120 MMHG | HEIGHT: 64 IN | BODY MASS INDEX: 32.73 KG/M2 | WEIGHT: 191.69 LBS

## 2021-09-21 DIAGNOSIS — Z11.4 SCREENING FOR HIV (HUMAN IMMUNODEFICIENCY VIRUS): ICD-10-CM

## 2021-09-21 DIAGNOSIS — D50.9 IRON DEFICIENCY ANEMIA, UNSPECIFIED IRON DEFICIENCY ANEMIA TYPE: ICD-10-CM

## 2021-09-21 DIAGNOSIS — J45.20 MILD INTERMITTENT CHRONIC ASTHMA WITHOUT COMPLICATION: ICD-10-CM

## 2021-09-21 DIAGNOSIS — D57.3 SICKLE CELL TRAIT: ICD-10-CM

## 2021-09-21 DIAGNOSIS — Z00.00 ROUTINE PHYSICAL EXAMINATION: Primary | ICD-10-CM

## 2021-09-21 DIAGNOSIS — M35.00 SJOGREN'S SYNDROME, WITH UNSPECIFIED ORGAN INVOLVEMENT: ICD-10-CM

## 2021-09-21 DIAGNOSIS — D64.9 ANEMIA, UNSPECIFIED TYPE: ICD-10-CM

## 2021-09-21 PROCEDURE — 99396 PR PREVENTIVE VISIT,EST,40-64: ICD-10-PCS | Mod: S$GLB,,, | Performed by: NURSE PRACTITIONER

## 2021-09-21 PROCEDURE — 3074F PR MOST RECENT SYSTOLIC BLOOD PRESSURE < 130 MM HG: ICD-10-PCS | Mod: CPTII,S$GLB,, | Performed by: NURSE PRACTITIONER

## 2021-09-21 PROCEDURE — 3078F DIAST BP <80 MM HG: CPT | Mod: CPTII,S$GLB,, | Performed by: NURSE PRACTITIONER

## 2021-09-21 PROCEDURE — 1159F MED LIST DOCD IN RCRD: CPT | Mod: CPTII,S$GLB,, | Performed by: NURSE PRACTITIONER

## 2021-09-21 PROCEDURE — 3074F SYST BP LT 130 MM HG: CPT | Mod: CPTII,S$GLB,, | Performed by: NURSE PRACTITIONER

## 2021-09-21 PROCEDURE — 99999 PR PBB SHADOW E&M-EST. PATIENT-LVL V: CPT | Mod: PBBFAC,,, | Performed by: NURSE PRACTITIONER

## 2021-09-21 PROCEDURE — 3078F PR MOST RECENT DIASTOLIC BLOOD PRESSURE < 80 MM HG: ICD-10-PCS | Mod: CPTII,S$GLB,, | Performed by: NURSE PRACTITIONER

## 2021-09-21 PROCEDURE — 3008F BODY MASS INDEX DOCD: CPT | Mod: CPTII,S$GLB,, | Performed by: NURSE PRACTITIONER

## 2021-09-21 PROCEDURE — 99999 PR PBB SHADOW E&M-EST. PATIENT-LVL V: ICD-10-PCS | Mod: PBBFAC,,, | Performed by: NURSE PRACTITIONER

## 2021-09-21 PROCEDURE — 1159F PR MEDICATION LIST DOCUMENTED IN MEDICAL RECORD: ICD-10-PCS | Mod: CPTII,S$GLB,, | Performed by: NURSE PRACTITIONER

## 2021-09-21 PROCEDURE — 3008F PR BODY MASS INDEX (BMI) DOCUMENTED: ICD-10-PCS | Mod: CPTII,S$GLB,, | Performed by: NURSE PRACTITIONER

## 2021-09-21 PROCEDURE — 99396 PREV VISIT EST AGE 40-64: CPT | Mod: S$GLB,,, | Performed by: NURSE PRACTITIONER

## 2021-09-21 RX ORDER — ALBUTEROL SULFATE 1.25 MG/3ML
1.25 SOLUTION RESPIRATORY (INHALATION) EVERY 6 HOURS PRN
Qty: 1 BOX | Refills: 2 | Status: SHIPPED | OUTPATIENT
Start: 2021-09-21 | End: 2022-11-22

## 2021-09-21 RX ORDER — PREDNISONE 20 MG/1
20 TABLET ORAL 2 TIMES DAILY
COMMUNITY
Start: 2021-06-08 | End: 2021-10-12

## 2021-09-21 RX ORDER — ALBUTEROL SULFATE 1.25 MG/3ML
1.25 SOLUTION RESPIRATORY (INHALATION) EVERY 6 HOURS PRN
COMMUNITY
End: 2021-09-21 | Stop reason: SDUPTHER

## 2021-09-22 ENCOUNTER — OFFICE VISIT (OUTPATIENT)
Dept: OPTOMETRY | Facility: CLINIC | Age: 46
End: 2021-09-22
Payer: COMMERCIAL

## 2021-09-22 DIAGNOSIS — H26.9 CORTICAL CATARACT OF LEFT EYE: ICD-10-CM

## 2021-09-22 DIAGNOSIS — H52.13 MYOPIA WITH PRESBYOPIA OF BOTH EYES: ICD-10-CM

## 2021-09-22 DIAGNOSIS — M35.00 SJOGREN'S SYNDROME, WITH UNSPECIFIED ORGAN INVOLVEMENT: ICD-10-CM

## 2021-09-22 DIAGNOSIS — H04.123 DRY EYE SYNDROME, BILATERAL: Primary | ICD-10-CM

## 2021-09-22 DIAGNOSIS — Z79.899 LONG-TERM USE OF PLAQUENIL: ICD-10-CM

## 2021-09-22 DIAGNOSIS — H52.4 MYOPIA WITH PRESBYOPIA OF BOTH EYES: ICD-10-CM

## 2021-09-22 DIAGNOSIS — Z13.5 SCREENING FOR EYE CONDITION: ICD-10-CM

## 2021-09-22 PROCEDURE — 92015 PR REFRACTION: ICD-10-PCS | Mod: S$GLB,,, | Performed by: OPTOMETRIST

## 2021-09-22 PROCEDURE — 92015 DETERMINE REFRACTIVE STATE: CPT | Mod: S$GLB,,, | Performed by: OPTOMETRIST

## 2021-09-22 PROCEDURE — 99999 PR PBB SHADOW E&M-EST. PATIENT-LVL II: ICD-10-PCS | Mod: PBBFAC,,, | Performed by: OPTOMETRIST

## 2021-09-22 PROCEDURE — 1159F MED LIST DOCD IN RCRD: CPT | Mod: CPTII,S$GLB,, | Performed by: OPTOMETRIST

## 2021-09-22 PROCEDURE — 92014 COMPRE OPH EXAM EST PT 1/>: CPT | Mod: S$GLB,,, | Performed by: OPTOMETRIST

## 2021-09-22 PROCEDURE — 2023F DILAT RTA XM W/O RTNOPTHY: CPT | Mod: CPTII,S$GLB,, | Performed by: OPTOMETRIST

## 2021-09-22 PROCEDURE — 99999 PR PBB SHADOW E&M-EST. PATIENT-LVL II: CPT | Mod: PBBFAC,,, | Performed by: OPTOMETRIST

## 2021-09-22 PROCEDURE — 92014 PR EYE EXAM, EST PATIENT,COMPREHESV: ICD-10-PCS | Mod: S$GLB,,, | Performed by: OPTOMETRIST

## 2021-09-22 PROCEDURE — 92134 POSTERIOR SEGMENT OCT RETINA (OCULAR COHERENCE TOMOGRAPHY)-BOTH EYES: ICD-10-PCS | Mod: S$GLB,,, | Performed by: OPTOMETRIST

## 2021-09-22 PROCEDURE — 2023F PR DILATED RETINAL EXAM W/O EVID OF RETINOPATHY: ICD-10-PCS | Mod: CPTII,S$GLB,, | Performed by: OPTOMETRIST

## 2021-09-22 PROCEDURE — 92134 CPTRZ OPH DX IMG PST SGM RTA: CPT | Mod: S$GLB,,, | Performed by: OPTOMETRIST

## 2021-09-22 PROCEDURE — 1159F PR MEDICATION LIST DOCUMENTED IN MEDICAL RECORD: ICD-10-PCS | Mod: CPTII,S$GLB,, | Performed by: OPTOMETRIST

## 2021-09-22 RX ORDER — CYCLOSPORINE 0.5 MG/ML
1 EMULSION OPHTHALMIC 2 TIMES DAILY
Qty: 60 VIAL | Refills: 5 | Status: ON HOLD | OUTPATIENT
Start: 2021-09-22 | End: 2024-03-21

## 2021-10-12 ENCOUNTER — OFFICE VISIT (OUTPATIENT)
Dept: RHEUMATOLOGY | Facility: CLINIC | Age: 46
End: 2021-10-12
Payer: COMMERCIAL

## 2021-10-12 ENCOUNTER — LAB VISIT (OUTPATIENT)
Dept: LAB | Facility: HOSPITAL | Age: 46
End: 2021-10-12
Attending: INTERNAL MEDICINE
Payer: COMMERCIAL

## 2021-10-12 VITALS
DIASTOLIC BLOOD PRESSURE: 81 MMHG | HEIGHT: 64 IN | WEIGHT: 198.88 LBS | BODY MASS INDEX: 33.95 KG/M2 | SYSTOLIC BLOOD PRESSURE: 115 MMHG | HEART RATE: 83 BPM

## 2021-10-12 DIAGNOSIS — K11.1 PAROTID GLAND ENLARGEMENT: ICD-10-CM

## 2021-10-12 DIAGNOSIS — D80.3 IGG2 DEFICIENCY: ICD-10-CM

## 2021-10-12 DIAGNOSIS — G47.00 PERSISTENT INSOMNIA: ICD-10-CM

## 2021-10-12 DIAGNOSIS — E66.9 OBESITY (BMI 30.0-34.9): ICD-10-CM

## 2021-10-12 DIAGNOSIS — M35.09 SJOGREN'S SYNDROME WITH OTHER ORGAN INVOLVEMENT: ICD-10-CM

## 2021-10-12 DIAGNOSIS — M35.09 SJOGREN'S SYNDROME WITH OTHER ORGAN INVOLVEMENT: Primary | ICD-10-CM

## 2021-10-12 DIAGNOSIS — Z79.899 LONG-TERM USE OF HYDROXYCHLOROQUINE: ICD-10-CM

## 2021-10-12 DIAGNOSIS — M70.61 GREATER TROCHANTERIC BURSITIS OF RIGHT HIP: ICD-10-CM

## 2021-10-12 LAB
ALBUMIN SERPL BCP-MCNC: 3.4 G/DL (ref 3.5–5.2)
ALP SERPL-CCNC: 33 U/L (ref 55–135)
ALT SERPL W/O P-5'-P-CCNC: 19 U/L (ref 10–44)
ANION GAP SERPL CALC-SCNC: 9 MMOL/L (ref 8–16)
AST SERPL-CCNC: 21 U/L (ref 10–40)
BASOPHILS # BLD AUTO: 0.02 K/UL (ref 0–0.2)
BASOPHILS NFR BLD: 0.3 % (ref 0–1.9)
BILIRUB SERPL-MCNC: 0.7 MG/DL (ref 0.1–1)
BUN SERPL-MCNC: 8 MG/DL (ref 6–20)
CALCIUM SERPL-MCNC: 9.6 MG/DL (ref 8.7–10.5)
CHLORIDE SERPL-SCNC: 105 MMOL/L (ref 95–110)
CO2 SERPL-SCNC: 23 MMOL/L (ref 23–29)
CREAT SERPL-MCNC: 0.7 MG/DL (ref 0.5–1.4)
CRP SERPL-MCNC: 5.2 MG/L (ref 0–8.2)
DIFFERENTIAL METHOD: ABNORMAL
EOSINOPHIL # BLD AUTO: 0.1 K/UL (ref 0–0.5)
EOSINOPHIL NFR BLD: 0.9 % (ref 0–8)
ERYTHROCYTE [DISTWIDTH] IN BLOOD BY AUTOMATED COUNT: 13.7 % (ref 11.5–14.5)
ERYTHROCYTE [SEDIMENTATION RATE] IN BLOOD BY WESTERGREN METHOD: 88 MM/HR (ref 0–36)
EST. GFR  (AFRICAN AMERICAN): >60 ML/MIN/1.73 M^2
EST. GFR  (NON AFRICAN AMERICAN): >60 ML/MIN/1.73 M^2
GLUCOSE SERPL-MCNC: 92 MG/DL (ref 70–110)
HCT VFR BLD AUTO: 31.9 % (ref 37–48.5)
HGB BLD-MCNC: 9.9 G/DL (ref 12–16)
IMM GRANULOCYTES # BLD AUTO: 0.01 K/UL (ref 0–0.04)
IMM GRANULOCYTES NFR BLD AUTO: 0.2 % (ref 0–0.5)
LYMPHOCYTES # BLD AUTO: 0.9 K/UL (ref 1–4.8)
LYMPHOCYTES NFR BLD: 16.1 % (ref 18–48)
MCH RBC QN AUTO: 29.3 PG (ref 27–31)
MCHC RBC AUTO-ENTMCNC: 31 G/DL (ref 32–36)
MCV RBC AUTO: 94 FL (ref 82–98)
MONOCYTES # BLD AUTO: 0.3 K/UL (ref 0.3–1)
MONOCYTES NFR BLD: 5.3 % (ref 4–15)
NEUTROPHILS # BLD AUTO: 4.5 K/UL (ref 1.8–7.7)
NEUTROPHILS NFR BLD: 77.2 % (ref 38–73)
NRBC BLD-RTO: 0 /100 WBC
PLATELET # BLD AUTO: 210 K/UL (ref 150–450)
PMV BLD AUTO: 11.3 FL (ref 9.2–12.9)
POTASSIUM SERPL-SCNC: 3.9 MMOL/L (ref 3.5–5.1)
PROT SERPL-MCNC: 10.6 G/DL (ref 6–8.4)
RBC # BLD AUTO: 3.38 M/UL (ref 4–5.4)
SODIUM SERPL-SCNC: 137 MMOL/L (ref 136–145)
WBC # BLD AUTO: 5.85 K/UL (ref 3.9–12.7)

## 2021-10-12 PROCEDURE — 99999 PR PBB SHADOW E&M-EST. PATIENT-LVL IV: CPT | Mod: PBBFAC,,, | Performed by: INTERNAL MEDICINE

## 2021-10-12 PROCEDURE — 1160F RVW MEDS BY RX/DR IN RCRD: CPT | Mod: CPTII,S$GLB,, | Performed by: INTERNAL MEDICINE

## 2021-10-12 PROCEDURE — 84165 PATHOLOGIST INTERPRETATION SPE: ICD-10-PCS | Mod: 26,,, | Performed by: PATHOLOGY

## 2021-10-12 PROCEDURE — 85652 RBC SED RATE AUTOMATED: CPT | Performed by: INTERNAL MEDICINE

## 2021-10-12 PROCEDURE — 99214 PR OFFICE/OUTPT VISIT, EST, LEVL IV, 30-39 MIN: ICD-10-PCS | Mod: S$GLB,,, | Performed by: INTERNAL MEDICINE

## 2021-10-12 PROCEDURE — 1159F PR MEDICATION LIST DOCUMENTED IN MEDICAL RECORD: ICD-10-PCS | Mod: CPTII,S$GLB,, | Performed by: INTERNAL MEDICINE

## 2021-10-12 PROCEDURE — 1159F MED LIST DOCD IN RCRD: CPT | Mod: CPTII,S$GLB,, | Performed by: INTERNAL MEDICINE

## 2021-10-12 PROCEDURE — 80053 COMPREHEN METABOLIC PANEL: CPT | Performed by: INTERNAL MEDICINE

## 2021-10-12 PROCEDURE — 3008F PR BODY MASS INDEX (BMI) DOCUMENTED: ICD-10-PCS | Mod: CPTII,S$GLB,, | Performed by: INTERNAL MEDICINE

## 2021-10-12 PROCEDURE — 84165 PROTEIN E-PHORESIS SERUM: CPT | Performed by: INTERNAL MEDICINE

## 2021-10-12 PROCEDURE — 86334 IMMUNOFIX E-PHORESIS SERUM: CPT | Performed by: INTERNAL MEDICINE

## 2021-10-12 PROCEDURE — 99999 PR PBB SHADOW E&M-EST. PATIENT-LVL IV: ICD-10-PCS | Mod: PBBFAC,,, | Performed by: INTERNAL MEDICINE

## 2021-10-12 PROCEDURE — 1160F PR REVIEW ALL MEDS BY PRESCRIBER/CLIN PHARMACIST DOCUMENTED: ICD-10-PCS | Mod: CPTII,S$GLB,, | Performed by: INTERNAL MEDICINE

## 2021-10-12 PROCEDURE — 3074F PR MOST RECENT SYSTOLIC BLOOD PRESSURE < 130 MM HG: ICD-10-PCS | Mod: CPTII,S$GLB,, | Performed by: INTERNAL MEDICINE

## 2021-10-12 PROCEDURE — 86334 PATHOLOGIST INTERPRETATION IFE: ICD-10-PCS | Mod: 26,,, | Performed by: PATHOLOGY

## 2021-10-12 PROCEDURE — 3008F BODY MASS INDEX DOCD: CPT | Mod: CPTII,S$GLB,, | Performed by: INTERNAL MEDICINE

## 2021-10-12 PROCEDURE — 99214 OFFICE O/P EST MOD 30 MIN: CPT | Mod: S$GLB,,, | Performed by: INTERNAL MEDICINE

## 2021-10-12 PROCEDURE — 3079F DIAST BP 80-89 MM HG: CPT | Mod: CPTII,S$GLB,, | Performed by: INTERNAL MEDICINE

## 2021-10-12 PROCEDURE — 85025 COMPLETE CBC W/AUTO DIFF WBC: CPT | Performed by: INTERNAL MEDICINE

## 2021-10-12 PROCEDURE — 82787 IGG 1 2 3 OR 4 EACH: CPT | Mod: 59 | Performed by: INTERNAL MEDICINE

## 2021-10-12 PROCEDURE — 84165 PROTEIN E-PHORESIS SERUM: CPT | Mod: 26,,, | Performed by: PATHOLOGY

## 2021-10-12 PROCEDURE — 3074F SYST BP LT 130 MM HG: CPT | Mod: CPTII,S$GLB,, | Performed by: INTERNAL MEDICINE

## 2021-10-12 PROCEDURE — 86140 C-REACTIVE PROTEIN: CPT | Performed by: INTERNAL MEDICINE

## 2021-10-12 PROCEDURE — 3079F PR MOST RECENT DIASTOLIC BLOOD PRESSURE 80-89 MM HG: ICD-10-PCS | Mod: CPTII,S$GLB,, | Performed by: INTERNAL MEDICINE

## 2021-10-12 PROCEDURE — 86334 IMMUNOFIX E-PHORESIS SERUM: CPT | Mod: 26,,, | Performed by: PATHOLOGY

## 2021-10-12 RX ORDER — HYDROXYCHLOROQUINE SULFATE 200 MG/1
400 TABLET, FILM COATED ORAL DAILY
Qty: 180 TABLET | Refills: 2 | Status: SHIPPED | OUTPATIENT
Start: 2021-10-12 | End: 2022-09-05

## 2021-10-12 RX ORDER — PILOCARPINE HYDROCHLORIDE 7.5 MG/1
7.5 TABLET, FILM COATED ORAL 4 TIMES DAILY
Qty: 120 TABLET | Refills: 2 | Status: SHIPPED | OUTPATIENT
Start: 2021-10-12 | End: 2022-11-22

## 2021-10-12 ASSESSMENT — ROUTINE ASSESSMENT OF PATIENT INDEX DATA (RAPID3)
PATIENT GLOBAL ASSESSMENT SCORE: 4
MDHAQ FUNCTION SCORE: 0.7
AM STIFFNESS SCORE: 1, YES
PSYCHOLOGICAL DISTRESS SCORE: 5.5
TOTAL RAPID3 SCORE: 3.11
FATIGUE SCORE: 10
PAIN SCORE: 3
WHEN YOU AWAKENED IN THE MORNING OVER THE LAST WEEK, PLEASE INDICATE THE AMOUNT OF TIME IT TAKES UNTIL YOU ARE AS LIMBER AS YOU WILL BE FOR THE DAY: 15 MINUTES

## 2021-10-13 LAB
ALBUMIN SERPL ELPH-MCNC: 3.63 G/DL (ref 3.35–5.55)
ALPHA1 GLOB SERPL ELPH-MCNC: 0.31 G/DL (ref 0.17–0.41)
ALPHA2 GLOB SERPL ELPH-MCNC: 0.8 G/DL (ref 0.43–0.99)
B-GLOBULIN SERPL ELPH-MCNC: 1.39 G/DL (ref 0.5–1.1)
GAMMA GLOB SERPL ELPH-MCNC: 4.08 G/DL (ref 0.67–1.58)
INTERPRETATION SERPL IFE-IMP: NORMAL
PATHOLOGIST INTERPRETATION IFE: NORMAL
PATHOLOGIST INTERPRETATION SPE: NORMAL
PROT SERPL-MCNC: 10.2 G/DL (ref 6–8.4)

## 2021-10-15 LAB
IGG1 SER-MCNC: ABNORMAL MG/DL (ref 382–929)
IGG2 SER-MCNC: 100 MG/DL (ref 242–700)
IGG3 SER-MCNC: 95 MG/DL (ref 22–176)
IGG4 SER-MCNC: 23 MG/DL (ref 4–86)

## 2021-10-27 ENCOUNTER — OFFICE VISIT (OUTPATIENT)
Dept: ALLERGY | Facility: CLINIC | Age: 46
End: 2021-10-27
Payer: COMMERCIAL

## 2021-10-27 ENCOUNTER — TELEPHONE (OUTPATIENT)
Dept: ALLERGY | Facility: CLINIC | Age: 46
End: 2021-10-27

## 2021-10-27 VITALS — BODY MASS INDEX: 33.34 KG/M2 | HEIGHT: 64 IN | WEIGHT: 195.31 LBS

## 2021-10-27 DIAGNOSIS — D80.6 ANTI-POLYSACCHARIDE ANTIBODY DEFICIENCY: Primary | ICD-10-CM

## 2021-10-27 DIAGNOSIS — K21.9 GASTROESOPHAGEAL REFLUX DISEASE, UNSPECIFIED WHETHER ESOPHAGITIS PRESENT: ICD-10-CM

## 2021-10-27 DIAGNOSIS — J32.9 CHRONIC SINUSITIS, UNSPECIFIED LOCATION: ICD-10-CM

## 2021-10-27 DIAGNOSIS — D80.3 IGG2 SUBCLASS DEFICIENCY: ICD-10-CM

## 2021-10-27 PROCEDURE — 3008F PR BODY MASS INDEX (BMI) DOCUMENTED: ICD-10-PCS | Mod: CPTII,S$GLB,, | Performed by: ALLERGY & IMMUNOLOGY

## 2021-10-27 PROCEDURE — 99214 OFFICE O/P EST MOD 30 MIN: CPT | Mod: S$GLB,,, | Performed by: ALLERGY & IMMUNOLOGY

## 2021-10-27 PROCEDURE — 99999 PR PBB SHADOW E&M-EST. PATIENT-LVL IV: CPT | Mod: PBBFAC,,, | Performed by: ALLERGY & IMMUNOLOGY

## 2021-10-27 PROCEDURE — 3008F BODY MASS INDEX DOCD: CPT | Mod: CPTII,S$GLB,, | Performed by: ALLERGY & IMMUNOLOGY

## 2021-10-27 PROCEDURE — 99999 PR PBB SHADOW E&M-EST. PATIENT-LVL IV: ICD-10-PCS | Mod: PBBFAC,,, | Performed by: ALLERGY & IMMUNOLOGY

## 2021-10-27 PROCEDURE — 1159F PR MEDICATION LIST DOCUMENTED IN MEDICAL RECORD: ICD-10-PCS | Mod: CPTII,S$GLB,, | Performed by: ALLERGY & IMMUNOLOGY

## 2021-10-27 PROCEDURE — 99214 PR OFFICE/OUTPT VISIT, EST, LEVL IV, 30-39 MIN: ICD-10-PCS | Mod: S$GLB,,, | Performed by: ALLERGY & IMMUNOLOGY

## 2021-10-27 PROCEDURE — 1159F MED LIST DOCD IN RCRD: CPT | Mod: CPTII,S$GLB,, | Performed by: ALLERGY & IMMUNOLOGY

## 2021-10-27 RX ORDER — HUMAN IMMUNOGLOBULIN G 0.2 G/ML
12 LIQUID SUBCUTANEOUS
Qty: 240 ML | Refills: 11 | Status: SHIPPED | OUTPATIENT
Start: 2021-10-27 | End: 2022-10-25 | Stop reason: SDUPTHER

## 2021-10-27 RX ORDER — CLARITHROMYCIN 500 MG/1
500 TABLET, FILM COATED ORAL EVERY 12 HOURS
Qty: 28 TABLET | Refills: 0 | Status: SHIPPED | OUTPATIENT
Start: 2021-10-27 | End: 2021-11-10

## 2021-10-27 RX ORDER — ALBUTEROL SULFATE 90 UG/1
2 AEROSOL, METERED RESPIRATORY (INHALATION) EVERY 4 HOURS PRN
Qty: 18 G | Refills: 3 | Status: SHIPPED | OUTPATIENT
Start: 2021-10-27 | End: 2023-10-13 | Stop reason: SDUPTHER

## 2021-11-15 ENCOUNTER — OFFICE VISIT (OUTPATIENT)
Dept: OPTOMETRY | Facility: CLINIC | Age: 46
End: 2021-11-15
Payer: COMMERCIAL

## 2021-11-15 ENCOUNTER — CLINICAL SUPPORT (OUTPATIENT)
Dept: OPHTHALMOLOGY | Facility: CLINIC | Age: 46
End: 2021-11-15
Payer: COMMERCIAL

## 2021-11-15 ENCOUNTER — TELEPHONE (OUTPATIENT)
Dept: OPHTHALMOLOGY | Facility: CLINIC | Age: 46
End: 2021-11-15

## 2021-11-15 DIAGNOSIS — M35.00 SJOGREN'S SYNDROME, WITH UNSPECIFIED ORGAN INVOLVEMENT: Primary | ICD-10-CM

## 2021-11-15 DIAGNOSIS — M35.00 SJOGREN'S SYNDROME, WITH UNSPECIFIED ORGAN INVOLVEMENT: ICD-10-CM

## 2021-11-15 DIAGNOSIS — Z13.5 SCREENING FOR EYE CONDITION: ICD-10-CM

## 2021-11-15 DIAGNOSIS — Z79.899 LONG-TERM USE OF PLAQUENIL: ICD-10-CM

## 2021-11-15 PROCEDURE — 99499 UNLISTED E&M SERVICE: CPT | Mod: S$GLB,,, | Performed by: OPTOMETRIST

## 2021-11-15 PROCEDURE — 99499 NO LOS: ICD-10-PCS | Mod: S$GLB,,, | Performed by: OPTOMETRIST

## 2021-11-15 PROCEDURE — 99999 PR PBB SHADOW E&M-EST. PATIENT-LVL II: CPT | Mod: PBBFAC,,, | Performed by: OPTOMETRIST

## 2021-11-15 PROCEDURE — 99999 PR PBB SHADOW E&M-EST. PATIENT-LVL II: ICD-10-PCS | Mod: PBBFAC,,, | Performed by: OPTOMETRIST

## 2021-11-15 PROCEDURE — 1159F MED LIST DOCD IN RCRD: CPT | Mod: CPTII,S$GLB,, | Performed by: OPTOMETRIST

## 2021-11-15 PROCEDURE — 1159F PR MEDICATION LIST DOCUMENTED IN MEDICAL RECORD: ICD-10-PCS | Mod: CPTII,S$GLB,, | Performed by: OPTOMETRIST

## 2021-11-19 ENCOUNTER — OFFICE VISIT (OUTPATIENT)
Dept: URGENT CARE | Facility: CLINIC | Age: 46
End: 2021-11-19
Payer: COMMERCIAL

## 2021-11-19 VITALS
OXYGEN SATURATION: 99 % | SYSTOLIC BLOOD PRESSURE: 126 MMHG | BODY MASS INDEX: 33.29 KG/M2 | WEIGHT: 195 LBS | DIASTOLIC BLOOD PRESSURE: 70 MMHG | HEIGHT: 64 IN | HEART RATE: 90 BPM | RESPIRATION RATE: 18 BRPM | TEMPERATURE: 98 F

## 2021-11-19 DIAGNOSIS — S92.414A CLOSED NONDISPLACED FRACTURE OF PROXIMAL PHALANX OF RIGHT GREAT TOE, INITIAL ENCOUNTER: Primary | ICD-10-CM

## 2021-11-19 DIAGNOSIS — M79.671 RIGHT FOOT PAIN: ICD-10-CM

## 2021-11-19 PROCEDURE — 1159F PR MEDICATION LIST DOCUMENTED IN MEDICAL RECORD: ICD-10-PCS | Mod: CPTII,S$GLB,, | Performed by: NURSE PRACTITIONER

## 2021-11-19 PROCEDURE — 3008F PR BODY MASS INDEX (BMI) DOCUMENTED: ICD-10-PCS | Mod: CPTII,S$GLB,, | Performed by: NURSE PRACTITIONER

## 2021-11-19 PROCEDURE — 3078F DIAST BP <80 MM HG: CPT | Mod: CPTII,S$GLB,, | Performed by: NURSE PRACTITIONER

## 2021-11-19 PROCEDURE — 1160F PR REVIEW ALL MEDS BY PRESCRIBER/CLIN PHARMACIST DOCUMENTED: ICD-10-PCS | Mod: CPTII,S$GLB,, | Performed by: NURSE PRACTITIONER

## 2021-11-19 PROCEDURE — 99213 OFFICE O/P EST LOW 20 MIN: CPT | Mod: S$GLB,,, | Performed by: NURSE PRACTITIONER

## 2021-11-19 PROCEDURE — 1159F MED LIST DOCD IN RCRD: CPT | Mod: CPTII,S$GLB,, | Performed by: NURSE PRACTITIONER

## 2021-11-19 PROCEDURE — 3074F PR MOST RECENT SYSTOLIC BLOOD PRESSURE < 130 MM HG: ICD-10-PCS | Mod: CPTII,S$GLB,, | Performed by: NURSE PRACTITIONER

## 2021-11-19 PROCEDURE — 3074F SYST BP LT 130 MM HG: CPT | Mod: CPTII,S$GLB,, | Performed by: NURSE PRACTITIONER

## 2021-11-19 PROCEDURE — 73630 XR FOOT COMPLETE 3 VIEW RIGHT: ICD-10-PCS | Mod: RT,S$GLB,, | Performed by: RADIOLOGY

## 2021-11-19 PROCEDURE — 1160F RVW MEDS BY RX/DR IN RCRD: CPT | Mod: CPTII,S$GLB,, | Performed by: NURSE PRACTITIONER

## 2021-11-19 PROCEDURE — 3008F BODY MASS INDEX DOCD: CPT | Mod: CPTII,S$GLB,, | Performed by: NURSE PRACTITIONER

## 2021-11-19 PROCEDURE — 73630 X-RAY EXAM OF FOOT: CPT | Mod: RT,S$GLB,, | Performed by: RADIOLOGY

## 2021-11-19 PROCEDURE — 99213 PR OFFICE/OUTPT VISIT, EST, LEVL III, 20-29 MIN: ICD-10-PCS | Mod: S$GLB,,, | Performed by: NURSE PRACTITIONER

## 2021-11-19 PROCEDURE — 3078F PR MOST RECENT DIASTOLIC BLOOD PRESSURE < 80 MM HG: ICD-10-PCS | Mod: CPTII,S$GLB,, | Performed by: NURSE PRACTITIONER

## 2021-11-30 ENCOUNTER — TELEPHONE (OUTPATIENT)
Dept: ALLERGY | Facility: CLINIC | Age: 46
End: 2021-11-30
Payer: COMMERCIAL

## 2022-01-10 ENCOUNTER — OFFICE VISIT (OUTPATIENT)
Dept: URGENT CARE | Facility: CLINIC | Age: 47
End: 2022-01-10
Payer: COMMERCIAL

## 2022-01-10 VITALS
RESPIRATION RATE: 18 BRPM | HEART RATE: 97 BPM | OXYGEN SATURATION: 99 % | SYSTOLIC BLOOD PRESSURE: 111 MMHG | DIASTOLIC BLOOD PRESSURE: 61 MMHG | HEIGHT: 64 IN | TEMPERATURE: 98 F | BODY MASS INDEX: 32.44 KG/M2 | WEIGHT: 190 LBS

## 2022-01-10 DIAGNOSIS — M54.32 LEFT SCIATIC NERVE PAIN: Primary | ICD-10-CM

## 2022-01-10 PROCEDURE — 3008F BODY MASS INDEX DOCD: CPT | Mod: CPTII,S$GLB,, | Performed by: FAMILY MEDICINE

## 2022-01-10 PROCEDURE — 1159F PR MEDICATION LIST DOCUMENTED IN MEDICAL RECORD: ICD-10-PCS | Mod: CPTII,S$GLB,, | Performed by: FAMILY MEDICINE

## 2022-01-10 PROCEDURE — 99213 PR OFFICE/OUTPT VISIT, EST, LEVL III, 20-29 MIN: ICD-10-PCS | Mod: S$GLB,,, | Performed by: FAMILY MEDICINE

## 2022-01-10 PROCEDURE — 3008F PR BODY MASS INDEX (BMI) DOCUMENTED: ICD-10-PCS | Mod: CPTII,S$GLB,, | Performed by: FAMILY MEDICINE

## 2022-01-10 PROCEDURE — 3078F PR MOST RECENT DIASTOLIC BLOOD PRESSURE < 80 MM HG: ICD-10-PCS | Mod: CPTII,S$GLB,, | Performed by: FAMILY MEDICINE

## 2022-01-10 PROCEDURE — 3078F DIAST BP <80 MM HG: CPT | Mod: CPTII,S$GLB,, | Performed by: FAMILY MEDICINE

## 2022-01-10 PROCEDURE — 99213 OFFICE O/P EST LOW 20 MIN: CPT | Mod: S$GLB,,, | Performed by: FAMILY MEDICINE

## 2022-01-10 PROCEDURE — 3074F PR MOST RECENT SYSTOLIC BLOOD PRESSURE < 130 MM HG: ICD-10-PCS | Mod: CPTII,S$GLB,, | Performed by: FAMILY MEDICINE

## 2022-01-10 PROCEDURE — 1160F PR REVIEW ALL MEDS BY PRESCRIBER/CLIN PHARMACIST DOCUMENTED: ICD-10-PCS | Mod: CPTII,S$GLB,, | Performed by: FAMILY MEDICINE

## 2022-01-10 PROCEDURE — 3074F SYST BP LT 130 MM HG: CPT | Mod: CPTII,S$GLB,, | Performed by: FAMILY MEDICINE

## 2022-01-10 PROCEDURE — 1159F MED LIST DOCD IN RCRD: CPT | Mod: CPTII,S$GLB,, | Performed by: FAMILY MEDICINE

## 2022-01-10 PROCEDURE — 1160F RVW MEDS BY RX/DR IN RCRD: CPT | Mod: CPTII,S$GLB,, | Performed by: FAMILY MEDICINE

## 2022-01-10 RX ORDER — CYCLOBENZAPRINE HCL 5 MG
5-10 TABLET ORAL 3 TIMES DAILY PRN
Qty: 30 TABLET | Refills: 0 | Status: SHIPPED | OUTPATIENT
Start: 2022-01-10 | End: 2023-05-02 | Stop reason: ALTCHOICE

## 2022-01-10 RX ORDER — IBUPROFEN 600 MG/1
600 TABLET ORAL EVERY 6 HOURS PRN
Qty: 30 TABLET | Refills: 0 | Status: SHIPPED | OUTPATIENT
Start: 2022-01-10 | End: 2022-01-13

## 2022-01-10 NOTE — LETTER
January 10, 2022      [facility] [address] [address]  Phone: [phone]  Fax: [phone]       Patient: Manasa Hollins   YOB: 1975  Date of Visit: 01/10/2022    To Whom It May Concern:    Alberto Hollins  was at [facility] on 01/10/2022. The patient may return to work/school on 1/17/21 with no restrictions. If you have any questions or concerns, or if I can be of further assistance, please do not hesitate to contact me.    Sincerely,    Maxwell Wheeler, NP

## 2022-01-10 NOTE — PATIENT INSTRUCTIONS
PLEASE READ YOUR DISCHARGE INSTRUCTIONS ENTIRELY AS IT CONTAINS IMPORTANT INFORMATION.      Please drink plenty of fluids.  Please get plenty of rest.  Ice to the area.   You may do gently stretching if tolerable.    Please return here or go to the Emergency Department for any concerns or worsening of condition.    If you were prescribed a narcotic medication or muscle relaxer (flexeril), do not drive or operate heavy equipment or machinery while taking these medications. Take a half first to see how it affects you    Take the ibuprofen with food. Not for longer than 7 days Do not take these medications on an empty stomach.    If you lose control of your bowel and/or bladder, please go to the nearest Emergency Department immediately.    If you lose sensation in between your legs by your genitalia and/or rectum, please go to the nearest Emergency Department immediately.    If you lose control or sensation of any extremity, please go to the nearest Emergency Department immediately.    Do not stay in one position to long.  When sleeping on your back place a pillow under knees to reduce tension on back.  If sleeping on your side, place pillow between knees to keep spine in better alinement.  Wear supportive shoes such as tennis shoes for support of the lower back.  Take any medication as directed.    Please follow up with your primary care doctor or specialist as needed.    If you  smoke, please stop smoking.      Please arrange follow up with your primary medical clinic as soon as possible. You must understand that you've received an Urgent Care treatment only and that you may be released before all of your medical problems are known or treated. You, the patient, will arrange for follow up as instructed. If your symptoms worsen or fail to improve you should go to the Emergency Room.    Patient Education       Sciatica Discharge Instructions   About this topic   You may have pain, weakness, numbness, and tingling that  runs from your buttocks down the back of your leg to your feet. This is called sciatica. Your sciatic nerve is a large nerve that starts in your lower back. It runs all the way down the back of your leg. You may have something like a disc or bone spur pressing on this nerve. When something is pressing on or bothering this nerve, it can cause sciatica. This is the medical name for pain, weakness, numbness, or tingling that goes from your buttock down your leg towards your heel. You can have sciatic pain on one side or on both sides. Most of the time, it will get better without surgery.       What care is needed at home?   · Ask the doctor what you need to do when you go home. Make sure you ask questions if you do not understand what the doctor says. This way you will know what to do.  · Stay as active as you can without causing too much pain. It is OK to rest your back for a day or so. Be sure to get up and move around gently during the day as you are able. After a few days, slowly start to increase your activity level as you are able to. If something causes your pain to come back or get worse, stop and go back to doing easier activities that did not hurt.  · Do not sit or  one position for a long time. You may want to sleep with a pillow under or between your knees if this eases your pain.  · You may want to take medicines like ibuprofen or naproxen for swelling and pain. These are nonsteroidal anti-inflammatory drugs (NSAIDS).  What follow-up care is needed?   · Your doctor may ask you to make visits to the office to check on your progress. Be sure to keep these visits.  · Your doctor may send you to physical therapy (PT) or a chiropractor for treatments to lessen pain and to learn the right exercises to do.  · Your doctor may also send you to a neurologist. This is a doctor who specializes in treating nerve problems.  · If you do not get better with treatment, your doctor may need to send you to an  orthopedic surgeon.  What drugs may be needed?   The doctor may order drugs to:  · Help with pain and swelling  The doctor may give you a shot of an anti-inflammatory drug called a corticosteroid. This will help with swelling. Talk with your doctor about the risks of this shot.  Will physical activity be limited?   You may need to rest for a while. You should not do physical activity that makes your health problem worse. Talk to your doctor if you run, work out, or play sports. You may not be able to do those things until your health problem get better.  What problems could happen?   · Long-term back pain  · Loss of feeling or movement in the legs or feet  · Weight gain, less muscle strength and flexibility, weaker bones  · Need for surgery  · Infection  · Loss of bowel and bladder function  What can be done to prevent this health problem?   · Stay active and work out to keep your muscles strong and flexible. Warm up slowly and stretch before you exercise.  · Use good posture.  · Use proper ways to lift and bend:  ? Spread your feet apart so you have a good base of support. Then, bend with your knees when you  something from the ground.  ? When lifting and moving an object, keep your back straight. Keep the object as close to your body as possible. Do not twist. Instead, move your feet to the direction you are going.  · Take breaks often when seated for long periods of time. Get up and walk around from time to time.  · If you stand for long periods, put one leg up on a small stool for a while. Then, change legs.  · If you sleep on your side, put a pillow in between your knees to keep your back and legs in a good position.  · Use good supportive footwear. Avoid high heels.  · Keep a healthy weight.  When do I need to call the doctor?   · You are unable to walk or start having trouble controlling your bowels or bladder.  · You get new or worsening pain, numbness, or weakness that spreads to both legs.  · Your  pain is getting worse, even with medicines and rest.  · You are not able do your normal activities because of the pain.  Helpful tips   · Water exercise or biking may help you stay in shape without making your problem worse.  · The right exercises for sciatica will depend on what the problem is that is causing the pain. Talk with your doctor about which stretches are best for you.  · Stretching may be slightly painful but should never give sharp pains. If it is painful, ease up until you only feel mild stretching. All stretching exercises should be held for 20 to 30 seconds to be most helpful. Repeat 2 to 3 times. Do 2 to 3 times each day to get the best results.  ? Lie on your back. Bend up the knee of the painful side until your foot is even with the other knee. Keeping your shoulders down, slowly drop the bent knee across the other leg. Do this until you feel a stretch in your buttocks.  ? Lie on your back with your knees bent and feet flat on the ground. If the problem is on your right leg, cross your right ankle onto your left thigh just above the knee. Reach your right arm in between your thighs and clasp your hands around your left thigh. Slowly pull the left thigh up towards your chest until you feel stretching in the right buttock.  Teach Back: Helping You Understand   The Teach Back Method helps you understand the information we are giving you. After you talk with the staff, tell them in your own words what you learned. This helps to make sure the staff has described each thing clearly. It also helps to explain things that may have been confusing. Before going home, make sure you can do these:  · I can tell you about my condition.  · I can tell you what may help ease my pain.  · I can tell you what I will do if I have more pain or numbness in my leg or foot.  Where can I learn more?   [employer]  https://familydoctor.org/condition/piriformis-syndrome/   [employer] [facility] [place]  https://www.healthnavigator.org.[place]/health-a-z/s/sciatica/   [facility]  https://www.nhs.uk/conditions/sciatica/   Last Reviewed Date   2021-06-21  Consumer Information Use and Disclaimer   This information is not specific medical advice and does not replace information you receive from your health care provider. This is only a brief summary of general information. It does NOT include all information about conditions, illnesses, injuries, tests, procedures, treatments, therapies, discharge instructions or life-style choices that may apply to you. You must talk with your health care provider for complete information about your health and treatment options. This information should not be used to decide whether or not to accept your health care providers advice, instructions or recommendations. Only your health care provider has the knowledge and training to provide advice that is right for you.  Copyright   Copyright © 2021 [employer] and its affiliates and/or licensors. All rights reserved.  Patient Education       Sciatica Exercises   About this topic   Sciatica is pain, weakness, numbness, or tingling that runs from your buttocks down the back of your leg to your feet. It happens when something is pressing on, or bothering, the sciatic nerve. This large nerve starts in your lower back. It runs all the way down the back of your leg to your foot.  The exercises for sciatica may be different based on the cause of your pain. Exercise may be slightly uncomfortable, but you should not have sharp pains. If you do get sharp pains, stop what you are doing. If the sharp pains continue, call your doctor.  General   Before starting with a program, ask your doctor if you are healthy enough to do these exercises. Your doctor may have you work with a chiropractor, , or physical therapist to make a safe exercise program to meet your needs.  Stretching Exercises   Stretching exercises keep your  muscles flexible. They also stop them from getting tight. Start by doing each of these stretches 2 to 3 times. In order for your body to make changes, you will need to hold these stretches for 20 to 30 seconds. Try to do the stretches 2 to 3 times each day. Do all exercises slowly.  · Single knee to chest stretches ? Lie on your back. Pull one knee towards your chest until you feel a stretch in your lower back and buttock area. Repeat with the other knee. If you have knee problems, pull your knee up by grabbing the back of your thigh instead of the front of your knee. You can also do this exercise by grabbing both knees at the same time.  · Deep hip stretches lying down ? Lie on your back and bend one knee, keeping that foot flat on the floor. Cross the other leg over your knee. Grab the thigh of the leg that has the foot on the floor. Slowly, pull the bottom leg towards your chest until you feel a stretch in the other buttock. Repeat using the opposite leg as the bottom leg.  · Deep hip stretches sitting ? Sit on the floor with both legs straight. Take one leg and cross it over the other leg so that the foot of your top leg is next to your outer knee. Now, take the elbow on the opposite side of your bent knee and bring it to the outside of the bent knee. With your elbow, slowly push the bent knee further across the body to get a good stretch in the hip.  Strengthening Exercises   Strengthening exercises keep your muscles firm and strong. Start by repeating each exercise 2 to 3 times. Work up to doing each exercise 10 times. Try to do the exercises 2 to 3 times each day. Hold each exercise for 3 to 5 seconds. Do all exercises slowly.  · Hip lifts ? Lie on your back with your knees bent and feet flat on the floor. Tighten your stomach muscles and lift your buttocks off the floor. Relax.  · Arm and leg lifts on hands and knees ? Start on your hands and knees. With all of these exercises, keep your back as level as  possible. If you are having trouble with this, you may want to put a small object on your back such as a book. If it falls off, you are not keeping your back level enough during the exercise.  ? Lift one arm up to shoulder level and hold. Lower it back down. Now, lift up the other arm and hold.  ? Lift one leg up and kick it straight out until it is in line with your back and hold. Lower it back down. Now, lift up the other leg and hold.  ? Lift one arm and the OPPOSITE leg up at the same time and hold. Lower them down. Now, repeat using the other arm and leg. This is a very hard exercise. It may take time to be able to do this.             What will the results be?   · Better flexibility  · Less pain  · Less numbness and tingling  · More leg strength  · Easier to walk and do other activities  · Increased core strength  Helpful tips   · Stay active and work out to keep your muscles strong and flexible.  · Keep a healthy weight to avoid putting too much stress on your spine. Eat a healthy diet to keep your muscles healthy.  · Be sure you do not hold your breath when exercising. This can raise your blood pressure. If you tend to hold your breath, try counting out loud when exercising. If any exercise bothers you, stop right away.  · Always warm up before stretching. Heated muscles stretch much easier than cool muscles. Stretching cool muscles can lead to injury.  · Try walking or cycling at an easy pace for a few minutes to warm up your muscles. Do this again after exercising.  · Never bounce when doing stretches.  · Apply ice to the low back on the side of leg pain.  · Doing exercises before a meal may be a good way to get into a routine.  Where can I learn more?   [facility] Cho[facility]  http://www.nhs.uk/[facility]/Backpain/Pages/sciatica-exercises.aspx   Last Reviewed Date   2021-08-30  Consumer Information Use and Disclaimer   This information is not specific medical advice and does not replace information you receive from  your health care provider. This is only a brief summary of general information. It does NOT include all information about conditions, illnesses, injuries, tests, procedures, treatments, therapies, discharge instructions or life-style choices that may apply to you. You must talk with your health care provider for complete information about your health and treatment options. This information should not be used to decide whether or not to accept your health care providers advice, instructions or recommendations. Only your health care provider has the knowledge and training to provide advice that is right for you.  Copyright   Copyright © 2021 [employer] and its affiliates and/or licensors. All rights reserved.

## 2022-01-10 NOTE — PROGRESS NOTES
"Subjective:       Patient ID: Manasa Hollins is a 46 y.o. female.    Vitals:  height is 5' 4" (1.626 m) and weight is 86.2 kg (190 lb). Her temperature is 98.2 °F (36.8 °C). Her blood pressure is 111/61 and her pulse is 97. Her respiration is 18 and oxygen saturation is 99%.     Chief Complaint: Hip Pain and Back Pain    Pt c/o left sided hip/buttocks pain that radiates down her leg. She states she has a hx of sciatica but states that it on her right side. She also denies any urgency or frequency of urination as well as dysuria. She states that the pain began yesterday at 3:00 pm. She tried tylenol, Bengay, and taking a hot shower with no relief. She states that it is a pulsating pain. No fever. No trauma. Felt pain initially when stepping out of her truck. No leg numbness or loss of bowel or bladder function.    Back Pain  This is a new problem. The current episode started yesterday. The problem occurs constantly. The problem has been gradually worsening since onset. The quality of the pain is described as aching. The pain radiates to the left thigh and left knee. The pain is at a severity of 9/10. The pain is severe. The pain is the same all the time. The symptoms are aggravated by bending, sitting, standing and twisting. Pertinent negatives include no dysuria or pelvic pain. Risk factors include menopause. She has tried heat and NSAIDs for the symptoms. The treatment provided no relief.       Genitourinary: Negative for dysuria, frequency, urgency and pelvic pain.   Musculoskeletal: Positive for pain, back pain and muscle cramps.       Objective:      Physical Exam   Constitutional: She is oriented to person, place, and time. Vital signs are normal. She appears well-developed and well-nourished. She is active and cooperative. No distress.   HENT:   Head: Normocephalic and atraumatic.   Nose: Nose normal.   Mouth/Throat: Oropharynx is clear and moist and mucous membranes are normal.   Eyes: Conjunctivae " and lids are normal.   Neck: Trachea normal and phonation normal. Neck supple.   Cardiovascular: Normal rate, regular rhythm, normal heart sounds, intact distal pulses and normal pulses.   Pulmonary/Chest: Effort normal and breath sounds normal.   Abdominal: Normal appearance and bowel sounds are normal. She exhibits no abdominal bruit, no pulsatile midline mass and no mass. Soft.   Musculoskeletal:         General: No deformity or edema.      Lumbar back: She exhibits decreased range of motion and tenderness.        Back:       Comments: Normal sensation extremities  Antalgic gait   Neurological: She is alert and oriented to person, place, and time. She has normal strength and normal reflexes. No sensory deficit.   Skin: Skin is warm, dry, intact and not diaphoretic.   Psychiatric: She has a normal mood and affect. Her speech is normal and behavior is normal. Judgment and thought content normal. Cognition and memory  Nursing note and vitals reviewed.        Assessment:       1. Left sciatic nerve pain          Plan:         Left sciatic nerve pain  -     cyclobenzaprine (FLEXERIL) 5 MG tablet; Take 1-2 tablets (5-10 mg total) by mouth 3 (three) times daily as needed for Muscle spasms.  Dispense: 30 tablet; Refill: 0  -     ibuprofen (ADVIL,MOTRIN) 600 MG tablet; Take 1 tablet (600 mg total) by mouth every 6 (six) hours as needed for Pain.  Dispense: 30 tablet; Refill: 0    close f/u with pcp  Er precautions discussed    Patient Instructions   PLEASE READ YOUR DISCHARGE INSTRUCTIONS ENTIRELY AS IT CONTAINS IMPORTANT INFORMATION.      Please drink plenty of fluids.  Please get plenty of rest.  Ice to the area.   You may do gently stretching if tolerable.    Please return here or go to the Emergency Department for any concerns or worsening of condition.    If you were prescribed a narcotic medication or muscle relaxer (flexeril), do not drive or operate heavy equipment or machinery while taking these medications. Take  a half first to see how it affects you    Take the ibuprofen with food. Not for longer than 7 days Do not take these medications on an empty stomach.    If you lose control of your bowel and/or bladder, please go to the nearest Emergency Department immediately.    If you lose sensation in between your legs by your genitalia and/or rectum, please go to the nearest Emergency Department immediately.    If you lose control or sensation of any extremity, please go to the nearest Emergency Department immediately.    Do not stay in one position to long.  When sleeping on your back place a pillow under knees to reduce tension on back.  If sleeping on your side, place pillow between knees to keep spine in better alinement.  Wear supportive shoes such as tennis shoes for support of the lower back.  Take any medication as directed.    Please follow up with your primary care doctor or specialist as needed.    If you  smoke, please stop smoking.      Please arrange follow up with your primary medical clinic as soon as possible. You must understand that you've received an Urgent Care treatment only and that you may be released before all of your medical problems are known or treated. You, the patient, will arrange for follow up as instructed. If your symptoms worsen or fail to improve you should go to the Emergency Room.    Patient Education       Sciatica Discharge Instructions   About this topic   You may have pain, weakness, numbness, and tingling that runs from your buttocks down the back of your leg to your feet. This is called sciatica. Your sciatic nerve is a large nerve that starts in your lower back. It runs all the way down the back of your leg. You may have something like a disc or bone spur pressing on this nerve. When something is pressing on or bothering this nerve, it can cause sciatica. This is the medical name for pain, weakness, numbness, or tingling that goes from your buttock down your leg towards your heel.  You can have sciatic pain on one side or on both sides. Most of the time, it will get better without surgery.       What care is needed at home?   · Ask the doctor what you need to do when you go home. Make sure you ask questions if you do not understand what the doctor says. This way you will know what to do.  · Stay as active as you can without causing too much pain. It is OK to rest your back for a day or so. Be sure to get up and move around gently during the day as you are able. After a few days, slowly start to increase your activity level as you are able to. If something causes your pain to come back or get worse, stop and go back to doing easier activities that did not hurt.  · Do not sit or  one position for a long time. You may want to sleep with a pillow under or between your knees if this eases your pain.  · You may want to take medicines like ibuprofen or naproxen for swelling and pain. These are nonsteroidal anti-inflammatory drugs (NSAIDS).  What follow-up care is needed?   · Your doctor may ask you to make visits to the office to check on your progress. Be sure to keep these visits.  · Your doctor may send you to physical therapy (PT) or a chiropractor for treatments to lessen pain and to learn the right exercises to do.  · Your doctor may also send you to a neurologist. This is a doctor who specializes in treating nerve problems.  · If you do not get better with treatment, your doctor may need to send you to an orthopedic surgeon.  What drugs may be needed?   The doctor may order drugs to:  · Help with pain and swelling  The doctor may give you a shot of an anti-inflammatory drug called a corticosteroid. This will help with swelling. Talk with your doctor about the risks of this shot.  Will physical activity be limited?   You may need to rest for a while. You should not do physical activity that makes your health problem worse. Talk to your doctor if you run, work out, or play sports. You  may not be able to do those things until your health problem get better.  What problems could happen?   · Long-term back pain  · Loss of feeling or movement in the legs or feet  · Weight gain, less muscle strength and flexibility, weaker bones  · Need for surgery  · Infection  · Loss of bowel and bladder function  What can be done to prevent this health problem?   · Stay active and work out to keep your muscles strong and flexible. Warm up slowly and stretch before you exercise.  · Use good posture.  · Use proper ways to lift and bend:  ? Spread your feet apart so you have a good base of support. Then, bend with your knees when you  something from the ground.  ? When lifting and moving an object, keep your back straight. Keep the object as close to your body as possible. Do not twist. Instead, move your feet to the direction you are going.  · Take breaks often when seated for long periods of time. Get up and walk around from time to time.  · If you stand for long periods, put one leg up on a small stool for a while. Then, change legs.  · If you sleep on your side, put a pillow in between your knees to keep your back and legs in a good position.  · Use good supportive footwear. Avoid high heels.  · Keep a healthy weight.  When do I need to call the doctor?   · You are unable to walk or start having trouble controlling your bowels or bladder.  · You get new or worsening pain, numbness, or weakness that spreads to both legs.  · Your pain is getting worse, even with medicines and rest.  · You are not able do your normal activities because of the pain.  Helpful tips   · Water exercise or biking may help you stay in shape without making your problem worse.  · The right exercises for sciatica will depend on what the problem is that is causing the pain. Talk with your doctor about which stretches are best for you.  · Stretching may be slightly painful but should never give sharp pains. If it is painful, ease up until  you only feel mild stretching. All stretching exercises should be held for 20 to 30 seconds to be most helpful. Repeat 2 to 3 times. Do 2 to 3 times each day to get the best results.  ? Lie on your back. Bend up the knee of the painful side until your foot is even with the other knee. Keeping your shoulders down, slowly drop the bent knee across the other leg. Do this until you feel a stretch in your buttocks.  ? Lie on your back with your knees bent and feet flat on the ground. If the problem is on your right leg, cross your right ankle onto your left thigh just above the knee. Reach your right arm in between your thighs and clasp your hands around your left thigh. Slowly pull the left thigh up towards your chest until you feel stretching in the right buttock.  Teach Back: Helping You Understand   The Teach Back Method helps you understand the information we are giving you. After you talk with the staff, tell them in your own words what you learned. This helps to make sure the staff has described each thing clearly. It also helps to explain things that may have been confusing. Before going home, make sure you can do these:  · I can tell you about my condition.  · I can tell you what may help ease my pain.  · I can tell you what I will do if I have more pain or numbness in my leg or foot.  Where can I learn more?   [employer]  https://familydoctor.org/condition/piriformis-syndrome/   [employer] [place]  https://www.healthnavigator.org.nz/health-a-z/s/sciatica/   [facility]  https://www.nhs.uk/conditions/sciatica/   Last Reviewed Date   2021-06-21  Consumer Information Use and Disclaimer   This information is not specific medical advice and does not replace information you receive from your health care provider. This is only a brief summary of general information. It does NOT include all information about conditions, illnesses, injuries, tests, procedures,  treatments, therapies, discharge instructions or life-style choices that may apply to you. You must talk with your health care provider for complete information about your health and treatment options. This information should not be used to decide whether or not to accept your health care providers advice, instructions or recommendations. Only your health care provider has the knowledge and training to provide advice that is right for you.  Copyright   Copyright © 2021 [employer] and its affiliates and/or licensors. All rights reserved.  Patient Education       Sciatica Exercises   About this topic   Sciatica is pain, weakness, numbness, or tingling that runs from your buttocks down the back of your leg to your feet. It happens when something is pressing on, or bothering, the sciatic nerve. This large nerve starts in your lower back. It runs all the way down the back of your leg to your foot.  The exercises for sciatica may be different based on the cause of your pain. Exercise may be slightly uncomfortable, but you should not have sharp pains. If you do get sharp pains, stop what you are doing. If the sharp pains continue, call your doctor.  General   Before starting with a program, ask your doctor if you are healthy enough to do these exercises. Your doctor may have you work with a chiropractor, , or physical therapist to make a safe exercise program to meet your needs.  Stretching Exercises   Stretching exercises keep your muscles flexible. They also stop them from getting tight. Start by doing each of these stretches 2 to 3 times. In order for your body to make changes, you will need to hold these stretches for 20 to 30 seconds. Try to do the stretches 2 to 3 times each day. Do all exercises slowly.  · Single knee to chest stretches ? Lie on your back. Pull one knee towards your chest until you feel a stretch in your lower back and buttock area. Repeat with the other knee. If you have knee problems,  pull your knee up by grabbing the back of your thigh instead of the front of your knee. You can also do this exercise by grabbing both knees at the same time.  · Deep hip stretches lying down ? Lie on your back and bend one knee, keeping that foot flat on the floor. Cross the other leg over your knee. Grab the thigh of the leg that has the foot on the floor. Slowly, pull the bottom leg towards your chest until you feel a stretch in the other buttock. Repeat using the opposite leg as the bottom leg.  · Deep hip stretches sitting ? Sit on the floor with both legs straight. Take one leg and cross it over the other leg so that the foot of your top leg is next to your outer knee. Now, take the elbow on the opposite side of your bent knee and bring it to the outside of the bent knee. With your elbow, slowly push the bent knee further across the body to get a good stretch in the hip.  Strengthening Exercises   Strengthening exercises keep your muscles firm and strong. Start by repeating each exercise 2 to 3 times. Work up to doing each exercise 10 times. Try to do the exercises 2 to 3 times each day. Hold each exercise for 3 to 5 seconds. Do all exercises slowly.  · Hip lifts ? Lie on your back with your knees bent and feet flat on the floor. Tighten your stomach muscles and lift your buttocks off the floor. Relax.  · Arm and leg lifts on hands and knees ? Start on your hands and knees. With all of these exercises, keep your back as level as possible. If you are having trouble with this, you may want to put a small object on your back such as a book. If it falls off, you are not keeping your back level enough during the exercise.  ? Lift one arm up to shoulder level and hold. Lower it back down. Now, lift up the other arm and hold.  ? Lift one leg up and kick it straight out until it is in line with your back and hold. Lower it back down. Now, lift up the other leg and hold.  ? Lift one arm and the OPPOSITE leg up at the  same time and hold. Lower them down. Now, repeat using the other arm and leg. This is a very hard exercise. It may take time to be able to do this.             What will the results be?   · Better flexibility  · Less pain  · Less numbness and tingling  · More leg strength  · Easier to walk and do other activities  · Increased core strength  Helpful tips   · Stay active and work out to keep your muscles strong and flexible.  · Keep a healthy weight to avoid putting too much stress on your spine. Eat a healthy diet to keep your muscles healthy.  · Be sure you do not hold your breath when exercising. This can raise your blood pressure. If you tend to hold your breath, try counting out loud when exercising. If any exercise bothers you, stop right away.  · Always warm up before stretching. Heated muscles stretch much easier than cool muscles. Stretching cool muscles can lead to injury.  · Try walking or cycling at an easy pace for a few minutes to warm up your muscles. Do this again after exercising.  · Never bounce when doing stretches.  · Apply ice to the low back on the side of leg pain.  · Doing exercises before a meal may be a good way to get into a routine.  Where can I learn more?   [facility] Choices  http://www.nhs.uk/[facility]/Backpain/Pages/sciatica-exercises.aspx   Last Reviewed Date   2021-08-30  Consumer Information Use and Disclaimer   This information is not specific medical advice and does not replace information you receive from your health care provider. This is only a brief summary of general information. It does NOT include all information about conditions, illnesses, injuries, tests, procedures, treatments, therapies, discharge instructions or life-style choices that may apply to you. You must talk with your health care provider for complete information about your health and treatment options. This information should not be used to decide whether or not to accept your health care providers advice,  instructions or recommendations. Only your health care provider has the knowledge and training to provide advice that is right for you.  Copyright   Copyright © 2021 [employer] and its affiliates and/or licensors. All rights reserved.

## 2022-02-23 DIAGNOSIS — D84.9 IMMUNOSUPPRESSED STATUS: ICD-10-CM

## 2022-05-25 ENCOUNTER — HOSPITAL ENCOUNTER (EMERGENCY)
Facility: HOSPITAL | Age: 47
Discharge: HOME | End: 2022-05-25
Attending: EMERGENCY MEDICINE
Payer: COMMERCIAL

## 2022-05-25 VITALS
HEART RATE: 68 BPM | DIASTOLIC BLOOD PRESSURE: 63 MMHG | OXYGEN SATURATION: 100 % | BODY MASS INDEX: 34.31 KG/M2 | SYSTOLIC BLOOD PRESSURE: 113 MMHG | RESPIRATION RATE: 18 BRPM | TEMPERATURE: 98 F | WEIGHT: 201 LBS | HEIGHT: 64 IN

## 2022-05-25 DIAGNOSIS — R42 LIGHTHEADEDNESS: ICD-10-CM

## 2022-05-25 DIAGNOSIS — R10.13 EPIGASTRIC ABDOMINAL PAIN: ICD-10-CM

## 2022-05-25 DIAGNOSIS — R11.2 NON-INTRACTABLE VOMITING WITH NAUSEA, UNSPECIFIED VOMITING TYPE: Primary | ICD-10-CM

## 2022-05-25 LAB
ALBUMIN SERPL BCP-MCNC: 2.8 G/DL (ref 3.5–5.2)
ALP SERPL-CCNC: 21 U/L (ref 55–135)
ALT SERPL W/O P-5'-P-CCNC: 17 U/L (ref 10–44)
ANION GAP SERPL CALC-SCNC: 6 MMOL/L (ref 8–16)
AST SERPL-CCNC: 18 U/L (ref 10–40)
BASOPHILS # BLD AUTO: 0 K/UL (ref 0–0.2)
BASOPHILS NFR BLD: 0 % (ref 0–1.9)
BILIRUB SERPL-MCNC: 0.6 MG/DL (ref 0.1–1)
BILIRUB UR QL STRIP: NEGATIVE
BUN SERPL-MCNC: 8 MG/DL (ref 6–20)
CALCIUM SERPL-MCNC: 8.6 MG/DL (ref 8.7–10.5)
CHLORIDE SERPL-SCNC: 109 MMOL/L (ref 95–110)
CLARITY UR REFRACT.AUTO: CLEAR
CO2 SERPL-SCNC: 21 MMOL/L (ref 23–29)
COLOR UR AUTO: YELLOW
CREAT SERPL-MCNC: 0.6 MG/DL (ref 0.5–1.4)
DIFFERENTIAL METHOD: ABNORMAL
EOSINOPHIL # BLD AUTO: 0 K/UL (ref 0–0.5)
EOSINOPHIL NFR BLD: 0.2 % (ref 0–8)
ERYTHROCYTE [DISTWIDTH] IN BLOOD BY AUTOMATED COUNT: 13.6 % (ref 11.5–14.5)
EST. GFR  (AFRICAN AMERICAN): >60 ML/MIN/1.73 M^2
EST. GFR  (NON AFRICAN AMERICAN): >60 ML/MIN/1.73 M^2
GLUCOSE SERPL-MCNC: 91 MG/DL (ref 70–110)
GLUCOSE UR QL STRIP: NEGATIVE
HCT VFR BLD AUTO: 30.1 % (ref 37–48.5)
HCV AB SERPL QL IA: NEGATIVE
HGB BLD-MCNC: 9.8 G/DL (ref 12–16)
HGB UR QL STRIP: NEGATIVE
HIV 1+2 AB+HIV1 P24 AG SERPL QL IA: NEGATIVE
IMM GRANULOCYTES # BLD AUTO: 0.02 K/UL (ref 0–0.04)
IMM GRANULOCYTES NFR BLD AUTO: 0.3 % (ref 0–0.5)
KETONES UR QL STRIP: NEGATIVE
LEUKOCYTE ESTERASE UR QL STRIP: NEGATIVE
LIPASE SERPL-CCNC: 18 U/L (ref 4–60)
LYMPHOCYTES # BLD AUTO: 1.1 K/UL (ref 1–4.8)
LYMPHOCYTES NFR BLD: 17.4 % (ref 18–48)
MCH RBC QN AUTO: 30 PG (ref 27–31)
MCHC RBC AUTO-ENTMCNC: 32.6 G/DL (ref 32–36)
MCV RBC AUTO: 92 FL (ref 82–98)
MONOCYTES # BLD AUTO: 0.4 K/UL (ref 0.3–1)
MONOCYTES NFR BLD: 5.8 % (ref 4–15)
NEUTROPHILS # BLD AUTO: 4.9 K/UL (ref 1.8–7.7)
NEUTROPHILS NFR BLD: 76.3 % (ref 38–73)
NITRITE UR QL STRIP: NEGATIVE
NRBC BLD-RTO: 0 /100 WBC
PH UR STRIP: 6 [PH] (ref 5–8)
PLATELET # BLD AUTO: 188 K/UL (ref 150–450)
PMV BLD AUTO: 11.5 FL (ref 9.2–12.9)
POTASSIUM SERPL-SCNC: 3.7 MMOL/L (ref 3.5–5.1)
PROT SERPL-MCNC: 8.8 G/DL (ref 6–8.4)
PROT UR QL STRIP: NEGATIVE
RBC # BLD AUTO: 3.27 M/UL (ref 4–5.4)
SODIUM SERPL-SCNC: 136 MMOL/L (ref 136–145)
SP GR UR STRIP: 1.01 (ref 1–1.03)
TROPONIN I SERPL DL<=0.01 NG/ML-MCNC: <0.006 NG/ML (ref 0–0.03)
URN SPEC COLLECT METH UR: NORMAL
WBC # BLD AUTO: 6.39 K/UL (ref 3.9–12.7)

## 2022-05-25 PROCEDURE — 86803 HEPATITIS C AB TEST: CPT | Performed by: EMERGENCY MEDICINE

## 2022-05-25 PROCEDURE — 96375 TX/PRO/DX INJ NEW DRUG ADDON: CPT

## 2022-05-25 PROCEDURE — 96361 HYDRATE IV INFUSION ADD-ON: CPT

## 2022-05-25 PROCEDURE — 85025 COMPLETE CBC W/AUTO DIFF WBC: CPT | Performed by: PHYSICIAN ASSISTANT

## 2022-05-25 PROCEDURE — 83690 ASSAY OF LIPASE: CPT | Performed by: EMERGENCY MEDICINE

## 2022-05-25 PROCEDURE — 99284 EMERGENCY DEPT VISIT MOD MDM: CPT | Mod: ,,, | Performed by: PHYSICIAN ASSISTANT

## 2022-05-25 PROCEDURE — 81003 URINALYSIS AUTO W/O SCOPE: CPT | Performed by: PHYSICIAN ASSISTANT

## 2022-05-25 PROCEDURE — 99284 PR EMERGENCY DEPT VISIT,LEVEL IV: ICD-10-PCS | Mod: ,,, | Performed by: PHYSICIAN ASSISTANT

## 2022-05-25 PROCEDURE — 87389 HIV-1 AG W/HIV-1&-2 AB AG IA: CPT | Performed by: EMERGENCY MEDICINE

## 2022-05-25 PROCEDURE — 93010 ELECTROCARDIOGRAM REPORT: CPT | Mod: ,,, | Performed by: INTERNAL MEDICINE

## 2022-05-25 PROCEDURE — 84484 ASSAY OF TROPONIN QUANT: CPT | Performed by: PHYSICIAN ASSISTANT

## 2022-05-25 PROCEDURE — 93005 ELECTROCARDIOGRAM TRACING: CPT

## 2022-05-25 PROCEDURE — 63600175 PHARM REV CODE 636 W HCPCS: Performed by: PHYSICIAN ASSISTANT

## 2022-05-25 PROCEDURE — 96374 THER/PROPH/DIAG INJ IV PUSH: CPT

## 2022-05-25 PROCEDURE — 80053 COMPREHEN METABOLIC PANEL: CPT | Performed by: EMERGENCY MEDICINE

## 2022-05-25 PROCEDURE — 99284 EMERGENCY DEPT VISIT MOD MDM: CPT | Mod: 25

## 2022-05-25 PROCEDURE — 93010 EKG 12-LEAD: ICD-10-PCS | Mod: ,,, | Performed by: INTERNAL MEDICINE

## 2022-05-25 PROCEDURE — 25000003 PHARM REV CODE 250: Performed by: PHYSICIAN ASSISTANT

## 2022-05-25 RX ORDER — ONDANSETRON 4 MG/1
4 TABLET, FILM COATED ORAL EVERY 6 HOURS PRN
Qty: 12 TABLET | Refills: 0 | Status: SHIPPED | OUTPATIENT
Start: 2022-05-25 | End: 2023-07-31 | Stop reason: ALTCHOICE

## 2022-05-25 RX ORDER — OMEPRAZOLE 20 MG/1
20 CAPSULE, DELAYED RELEASE ORAL DAILY
Qty: 30 CAPSULE | Refills: 0 | Status: SHIPPED | OUTPATIENT
Start: 2022-05-25 | End: 2022-06-24

## 2022-05-25 RX ORDER — DICYCLOMINE HYDROCHLORIDE 20 MG/1
20 TABLET ORAL 2 TIMES DAILY PRN
Qty: 20 TABLET | Refills: 0 | Status: SHIPPED | OUTPATIENT
Start: 2022-05-25 | End: 2022-11-22

## 2022-05-25 RX ORDER — ONDANSETRON 2 MG/ML
4 INJECTION INTRAMUSCULAR; INTRAVENOUS
Status: COMPLETED | OUTPATIENT
Start: 2022-05-25 | End: 2022-05-25

## 2022-05-25 RX ORDER — ACETAMINOPHEN 325 MG/1
650 TABLET ORAL
Status: COMPLETED | OUTPATIENT
Start: 2022-05-25 | End: 2022-05-25

## 2022-05-25 RX ORDER — MAG HYDROX/ALUMINUM HYD/SIMETH 200-200-20
30 SUSPENSION, ORAL (FINAL DOSE FORM) ORAL ONCE
Status: COMPLETED | OUTPATIENT
Start: 2022-05-25 | End: 2022-05-25

## 2022-05-25 RX ORDER — FAMOTIDINE 10 MG/ML
20 INJECTION INTRAVENOUS
Status: COMPLETED | OUTPATIENT
Start: 2022-05-25 | End: 2022-05-25

## 2022-05-25 RX ORDER — LIDOCAINE HYDROCHLORIDE 20 MG/ML
10 SOLUTION OROPHARYNGEAL ONCE
Status: COMPLETED | OUTPATIENT
Start: 2022-05-25 | End: 2022-05-25

## 2022-05-25 RX ADMIN — ACETAMINOPHEN 650 MG: 325 TABLET ORAL at 10:05

## 2022-05-25 RX ADMIN — ALUMINUM HYDROXIDE, MAGNESIUM HYDROXIDE, AND SIMETHICONE 30 ML: 200; 200; 20 SUSPENSION ORAL at 09:05

## 2022-05-25 RX ADMIN — SODIUM CHLORIDE 1000 ML: 0.9 INJECTION, SOLUTION INTRAVENOUS at 09:05

## 2022-05-25 RX ADMIN — FAMOTIDINE 20 MG: 10 INJECTION INTRAVENOUS at 09:05

## 2022-05-25 RX ADMIN — ONDANSETRON 4 MG: 2 INJECTION INTRAMUSCULAR; INTRAVENOUS at 09:05

## 2022-05-25 RX ADMIN — LIDOCAINE HYDROCHLORIDE 10 ML: 20 SOLUTION ORAL; TOPICAL at 09:05

## 2022-05-25 NOTE — ED NOTES
Celestino mcgrath/ lab reporting hemolyzed sample for CMP and lipase. Lab recollected performed at this time.

## 2022-05-25 NOTE — DISCHARGE INSTRUCTIONS
Take omeprazole daily. Take bentyl and zofran as needed for abdominal pain and nausea. Follow up with GI and your pcp or return to the ER for any new or worsening symptoms.

## 2022-05-25 NOTE — Clinical Note
"Manasa Nicholson" Gurvinder was seen and treated in our emergency department on 5/25/2022.  She may return to work on 05/26/2022.       If you have any questions or concerns, please don't hesitate to call.       RN    "
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
## 2022-05-25 NOTE — ED NOTES
Patient identifiers verified and correct for Ms Hollins  C/C: Abd pain, n/v  APPEARANCE: awake and alert in NAD.  SKIN: warm, dry and intact. No breakdown or bruising.  MUSCULOSKELETAL: Patient moving all extremities spontaneously, no obvious swelling or deformities noted. Ambulates independently.  RESPIRATORY: Denies shortness of breath.Respirations unlabored.   CARDIAC: Denies CP, 2+ distal pulses; no peripheral edema  ABDOMEN: ABdomen soft, pain to left upper abdomen , reports nausea, vomiting   : voids spontaneously, denies difficulty  Neurologic: AAO x 4; follows commands equal strength in all extremities; denies numbness/tingling. Denies dizziness  Denies weakness

## 2022-09-26 ENCOUNTER — LAB VISIT (OUTPATIENT)
Dept: LAB | Facility: HOSPITAL | Age: 47
End: 2022-09-26
Attending: ALLERGY & IMMUNOLOGY
Payer: COMMERCIAL

## 2022-09-26 ENCOUNTER — OFFICE VISIT (OUTPATIENT)
Dept: ALLERGY | Facility: CLINIC | Age: 47
End: 2022-09-26
Payer: COMMERCIAL

## 2022-09-26 VITALS
DIASTOLIC BLOOD PRESSURE: 72 MMHG | SYSTOLIC BLOOD PRESSURE: 133 MMHG | HEIGHT: 64 IN | OXYGEN SATURATION: 100 % | HEART RATE: 76 BPM | BODY MASS INDEX: 33.2 KG/M2 | WEIGHT: 194.44 LBS

## 2022-09-26 DIAGNOSIS — D80.3 IGG2 SUBCLASS DEFICIENCY: ICD-10-CM

## 2022-09-26 DIAGNOSIS — D80.6 ANTI-POLYSACCHARIDE ANTIBODY DEFICIENCY: ICD-10-CM

## 2022-09-26 DIAGNOSIS — D80.6 ANTI-POLYSACCHARIDE ANTIBODY DEFICIENCY: Primary | ICD-10-CM

## 2022-09-26 DIAGNOSIS — J32.9 CHRONIC SINUSITIS, UNSPECIFIED LOCATION: ICD-10-CM

## 2022-09-26 DIAGNOSIS — K21.9 GASTROESOPHAGEAL REFLUX DISEASE, UNSPECIFIED WHETHER ESOPHAGITIS PRESENT: ICD-10-CM

## 2022-09-26 LAB
IGA SERPL-MCNC: 559 MG/DL (ref 40–350)
IGG SERPL-MCNC: 5119 MG/DL (ref 650–1600)
IGM SERPL-MCNC: 46 MG/DL (ref 50–300)

## 2022-09-26 PROCEDURE — 3075F PR MOST RECENT SYSTOLIC BLOOD PRESS GE 130-139MM HG: ICD-10-PCS | Mod: CPTII,S$GLB,, | Performed by: ALLERGY & IMMUNOLOGY

## 2022-09-26 PROCEDURE — 99999 PR PBB SHADOW E&M-EST. PATIENT-LVL III: CPT | Mod: PBBFAC,,, | Performed by: ALLERGY & IMMUNOLOGY

## 2022-09-26 PROCEDURE — 36415 COLL VENOUS BLD VENIPUNCTURE: CPT | Performed by: ALLERGY & IMMUNOLOGY

## 2022-09-26 PROCEDURE — 3008F PR BODY MASS INDEX (BMI) DOCUMENTED: ICD-10-PCS | Mod: CPTII,S$GLB,, | Performed by: ALLERGY & IMMUNOLOGY

## 2022-09-26 PROCEDURE — 82784 ASSAY IGA/IGD/IGG/IGM EACH: CPT | Performed by: ALLERGY & IMMUNOLOGY

## 2022-09-26 PROCEDURE — 3075F SYST BP GE 130 - 139MM HG: CPT | Mod: CPTII,S$GLB,, | Performed by: ALLERGY & IMMUNOLOGY

## 2022-09-26 PROCEDURE — 3078F DIAST BP <80 MM HG: CPT | Mod: CPTII,S$GLB,, | Performed by: ALLERGY & IMMUNOLOGY

## 2022-09-26 PROCEDURE — 99214 PR OFFICE/OUTPT VISIT, EST, LEVL IV, 30-39 MIN: ICD-10-PCS | Mod: S$GLB,,, | Performed by: ALLERGY & IMMUNOLOGY

## 2022-09-26 PROCEDURE — 3008F BODY MASS INDEX DOCD: CPT | Mod: CPTII,S$GLB,, | Performed by: ALLERGY & IMMUNOLOGY

## 2022-09-26 PROCEDURE — 99214 OFFICE O/P EST MOD 30 MIN: CPT | Mod: S$GLB,,, | Performed by: ALLERGY & IMMUNOLOGY

## 2022-09-26 PROCEDURE — 3078F PR MOST RECENT DIASTOLIC BLOOD PRESSURE < 80 MM HG: ICD-10-PCS | Mod: CPTII,S$GLB,, | Performed by: ALLERGY & IMMUNOLOGY

## 2022-09-26 PROCEDURE — 99999 PR PBB SHADOW E&M-EST. PATIENT-LVL III: ICD-10-PCS | Mod: PBBFAC,,, | Performed by: ALLERGY & IMMUNOLOGY

## 2022-09-26 RX ORDER — TRIAMCINOLONE ACETONIDE 1 MG/G
OINTMENT TOPICAL 2 TIMES DAILY
Qty: 80 G | Refills: 3 | Status: SHIPPED | OUTPATIENT
Start: 2022-09-26 | End: 2023-11-07

## 2022-09-26 RX ORDER — ALBUTEROL SULFATE 90 UG/1
2 AEROSOL, METERED RESPIRATORY (INHALATION) EVERY 4 HOURS PRN
Qty: 18 G | Refills: 3 | Status: SHIPPED | OUTPATIENT
Start: 2022-09-26 | End: 2023-11-06 | Stop reason: SDUPTHER
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
## 2022-10-25 DIAGNOSIS — D80.3 IGG2 SUBCLASS DEFICIENCY: ICD-10-CM

## 2022-10-25 DIAGNOSIS — D80.6 ANTI-POLYSACCHARIDE ANTIBODY DEFICIENCY: ICD-10-CM

## 2022-10-25 RX ORDER — LIDOCAINE AND PRILOCAINE 25; 25 MG/G; MG/G
CREAM TOPICAL
Qty: 30 G | Refills: 4 | Status: SHIPPED | OUTPATIENT
Start: 2022-10-25 | End: 2023-10-09

## 2022-10-25 RX ORDER — HUMAN IMMUNOGLOBULIN G 0.2 G/ML
12 LIQUID SUBCUTANEOUS
Qty: 240 ML | Refills: 11 | Status: SHIPPED | OUTPATIENT
Start: 2022-10-25 | End: 2023-09-27 | Stop reason: SDUPTHER

## 2022-11-09 ENCOUNTER — HOSPITAL ENCOUNTER (EMERGENCY)
Facility: HOSPITAL | Age: 47
Discharge: HOME | End: 2022-11-10
Attending: EMERGENCY MEDICINE
Payer: COMMERCIAL

## 2022-11-09 DIAGNOSIS — N83.209 CYST OF OVARY, UNSPECIFIED LATERALITY: Primary | ICD-10-CM

## 2022-11-09 LAB
ALBUMIN SERPL BCP-MCNC: 3 G/DL (ref 3.5–5.2)
ALP SERPL-CCNC: 28 U/L (ref 55–135)
ALT SERPL W/O P-5'-P-CCNC: 13 U/L (ref 10–44)
ANION GAP SERPL CALC-SCNC: 6 MMOL/L (ref 8–16)
AST SERPL-CCNC: 17 U/L (ref 10–40)
B-HCG UR QL: NEGATIVE
BASOPHILS # BLD AUTO: 0.01 K/UL (ref 0–0.2)
BASOPHILS NFR BLD: 0.1 % (ref 0–1.9)
BILIRUB SERPL-MCNC: 0.6 MG/DL (ref 0.1–1)
BUN SERPL-MCNC: 7 MG/DL (ref 6–20)
BUN SERPL-MCNC: 7 MG/DL (ref 6–30)
CALCIUM SERPL-MCNC: 8.8 MG/DL (ref 8.7–10.5)
CHLORIDE SERPL-SCNC: 104 MMOL/L (ref 95–110)
CHLORIDE SERPL-SCNC: 105 MMOL/L (ref 95–110)
CO2 SERPL-SCNC: 21 MMOL/L (ref 23–29)
CREAT SERPL-MCNC: 0.6 MG/DL (ref 0.5–1.4)
CREAT SERPL-MCNC: 0.7 MG/DL (ref 0.5–1.4)
CTP QC/QA: YES
DIFFERENTIAL METHOD: ABNORMAL
EOSINOPHIL # BLD AUTO: 0 K/UL (ref 0–0.5)
EOSINOPHIL NFR BLD: 0.3 % (ref 0–8)
ERYTHROCYTE [DISTWIDTH] IN BLOOD BY AUTOMATED COUNT: 14.3 % (ref 11.5–14.5)
EST. GFR  (NO RACE VARIABLE): >60 ML/MIN/1.73 M^2
GLUCOSE SERPL-MCNC: 95 MG/DL (ref 70–110)
GLUCOSE SERPL-MCNC: 96 MG/DL (ref 70–110)
HCT VFR BLD AUTO: 29.5 % (ref 37–48.5)
HCT VFR BLD CALC: 33 %PCV (ref 36–54)
HGB BLD-MCNC: 9.8 G/DL (ref 12–16)
IMM GRANULOCYTES # BLD AUTO: 0.02 K/UL (ref 0–0.04)
IMM GRANULOCYTES NFR BLD AUTO: 0.2 % (ref 0–0.5)
LIPASE SERPL-CCNC: 26 U/L (ref 4–60)
LYMPHOCYTES # BLD AUTO: 1.2 K/UL (ref 1–4.8)
LYMPHOCYTES NFR BLD: 11.9 % (ref 18–48)
MCH RBC QN AUTO: 30.2 PG (ref 27–31)
MCHC RBC AUTO-ENTMCNC: 33.2 G/DL (ref 32–36)
MCV RBC AUTO: 91 FL (ref 82–98)
MONOCYTES # BLD AUTO: 0.6 K/UL (ref 0.3–1)
MONOCYTES NFR BLD: 6.2 % (ref 4–15)
NEUTROPHILS # BLD AUTO: 8.1 K/UL (ref 1.8–7.7)
NEUTROPHILS NFR BLD: 81.3 % (ref 38–73)
NRBC BLD-RTO: 0 /100 WBC
PLATELET # BLD AUTO: 199 K/UL (ref 150–450)
PMV BLD AUTO: 10.5 FL (ref 9.2–12.9)
POC IONIZED CALCIUM: 1.18 MMOL/L (ref 1.06–1.42)
POC TCO2 (MEASURED): 23 MMOL/L (ref 23–29)
POTASSIUM BLD-SCNC: 4.1 MMOL/L (ref 3.5–5.1)
POTASSIUM SERPL-SCNC: 3.8 MMOL/L (ref 3.5–5.1)
PROT SERPL-MCNC: 9.8 G/DL (ref 6–8.4)
RBC # BLD AUTO: 3.25 M/UL (ref 4–5.4)
SAMPLE: ABNORMAL
SODIUM BLD-SCNC: 137 MMOL/L (ref 136–145)
SODIUM SERPL-SCNC: 131 MMOL/L (ref 136–145)
WBC # BLD AUTO: 10.01 K/UL (ref 3.9–12.7)

## 2022-11-09 PROCEDURE — 85025 COMPLETE CBC W/AUTO DIFF WBC: CPT | Performed by: NURSE PRACTITIONER

## 2022-11-09 PROCEDURE — 96375 TX/PRO/DX INJ NEW DRUG ADDON: CPT

## 2022-11-09 PROCEDURE — 96374 THER/PROPH/DIAG INJ IV PUSH: CPT | Mod: 59

## 2022-11-09 PROCEDURE — 80048 BASIC METABOLIC PNL TOTAL CA: CPT | Mod: XB

## 2022-11-09 PROCEDURE — 80053 COMPREHEN METABOLIC PANEL: CPT | Performed by: NURSE PRACTITIONER

## 2022-11-09 PROCEDURE — 81001 URINALYSIS AUTO W/SCOPE: CPT | Performed by: NURSE PRACTITIONER

## 2022-11-09 PROCEDURE — 99284 EMERGENCY DEPT VISIT MOD MDM: CPT | Mod: ,,, | Performed by: PHYSICIAN ASSISTANT

## 2022-11-09 PROCEDURE — 99284 PR EMERGENCY DEPT VISIT,LEVEL IV: ICD-10-PCS | Mod: ,,, | Performed by: PHYSICIAN ASSISTANT

## 2022-11-09 PROCEDURE — 83690 ASSAY OF LIPASE: CPT | Performed by: NURSE PRACTITIONER

## 2022-11-09 PROCEDURE — 63600175 PHARM REV CODE 636 W HCPCS: Performed by: PHYSICIAN ASSISTANT

## 2022-11-09 PROCEDURE — 96361 HYDRATE IV INFUSION ADD-ON: CPT

## 2022-11-09 PROCEDURE — 81025 URINE PREGNANCY TEST: CPT | Performed by: EMERGENCY MEDICINE

## 2022-11-09 PROCEDURE — 96372 THER/PROPH/DIAG INJ SC/IM: CPT | Mod: 59 | Performed by: PHYSICIAN ASSISTANT

## 2022-11-09 PROCEDURE — 99285 EMERGENCY DEPT VISIT HI MDM: CPT | Mod: 25

## 2022-11-09 RX ORDER — ONDANSETRON 2 MG/ML
4 INJECTION INTRAMUSCULAR; INTRAVENOUS
Status: COMPLETED | OUTPATIENT
Start: 2022-11-09 | End: 2022-11-09

## 2022-11-09 RX ORDER — DICYCLOMINE HYDROCHLORIDE 10 MG/ML
20 INJECTION INTRAMUSCULAR
Status: COMPLETED | OUTPATIENT
Start: 2022-11-09 | End: 2022-11-09

## 2022-11-09 RX ADMIN — SODIUM CHLORIDE, SODIUM LACTATE, POTASSIUM CHLORIDE, AND CALCIUM CHLORIDE 1000 ML: .6; .31; .03; .02 INJECTION, SOLUTION INTRAVENOUS at 11:11

## 2022-11-09 RX ADMIN — ONDANSETRON 4 MG: 2 INJECTION INTRAMUSCULAR; INTRAVENOUS at 11:11

## 2022-11-09 RX ADMIN — DICYCLOMINE HYDROCHLORIDE 20 MG: 20 INJECTION, SOLUTION INTRAMUSCULAR at 11:11

## 2022-11-09 NOTE — Clinical Note
"Manasa Nicholson" Gurvinder was seen and treated in our emergency department on 11/9/2022.  She may return to work on 11/14/2022.       If you have any questions or concerns, please don't hesitate to call.       RN    "

## 2022-11-10 VITALS
OXYGEN SATURATION: 100 % | TEMPERATURE: 100 F | SYSTOLIC BLOOD PRESSURE: 123 MMHG | WEIGHT: 191 LBS | HEART RATE: 105 BPM | HEIGHT: 63 IN | DIASTOLIC BLOOD PRESSURE: 65 MMHG | BODY MASS INDEX: 33.84 KG/M2 | RESPIRATION RATE: 18 BRPM

## 2022-11-10 LAB
BACTERIA #/AREA URNS AUTO: ABNORMAL /HPF
BILIRUB UR QL STRIP: NEGATIVE
CLARITY UR REFRACT.AUTO: CLEAR
COLOR UR AUTO: YELLOW
GLUCOSE UR QL STRIP: NEGATIVE
HGB UR QL STRIP: NEGATIVE
KETONES UR QL STRIP: NEGATIVE
LEUKOCYTE ESTERASE UR QL STRIP: ABNORMAL
MICROSCOPIC COMMENT: ABNORMAL
NITRITE UR QL STRIP: NEGATIVE
PH UR STRIP: 7 [PH] (ref 5–8)
PROT UR QL STRIP: NEGATIVE
RBC #/AREA URNS AUTO: 1 /HPF (ref 0–4)
SP GR UR STRIP: 1.01 (ref 1–1.03)
SQUAMOUS #/AREA URNS AUTO: 2 /HPF
URN SPEC COLLECT METH UR: ABNORMAL
WBC #/AREA URNS AUTO: 1 /HPF (ref 0–5)

## 2022-11-10 PROCEDURE — 25500020 PHARM REV CODE 255: Performed by: EMERGENCY MEDICINE

## 2022-11-10 PROCEDURE — 63600175 PHARM REV CODE 636 W HCPCS: Performed by: PHYSICIAN ASSISTANT

## 2022-11-10 RX ORDER — MORPHINE SULFATE 4 MG/ML
4 INJECTION, SOLUTION INTRAMUSCULAR; INTRAVENOUS
Status: COMPLETED | OUTPATIENT
Start: 2022-11-10 | End: 2022-11-10

## 2022-11-10 RX ORDER — OXYCODONE AND ACETAMINOPHEN 5; 325 MG/1; MG/1
1 TABLET ORAL EVERY 6 HOURS PRN
Qty: 12 TABLET | Refills: 0 | Status: SHIPPED | OUTPATIENT
Start: 2022-11-10 | End: 2023-05-02 | Stop reason: ALTCHOICE

## 2022-11-10 RX ADMIN — IOHEXOL 100 ML: 350 INJECTION, SOLUTION INTRAVENOUS at 12:11

## 2022-11-10 RX ADMIN — MORPHINE SULFATE 4 MG: 4 INJECTION, SOLUTION INTRAMUSCULAR; INTRAVENOUS at 01:11

## 2022-11-10 NOTE — ED PROVIDER NOTES
Encounter Date: 2022       History     Chief Complaint   Patient presents with    Abdominal Pain     Lower ab pain, +N/V, no bm for 8 days.      46-year-old presents with lower abdominal pain.  Pain began a few days ago.  Reports nausea and vomiting.  Frequently has issues with constipation, last bowel movement 8 days ago.  Denies any dysuria or hematuria.  Appears moderately uncomfortable.  Did not take any medication PTA.    Review of patient's allergies indicates:   Allergen Reactions    Sulfa dyne Shortness Of Breath     Other reaction(s): CAUSES ASTHMA ATTACK, Is not sure of the name of the medication that she is allergic to    Peanut Hives     Causes tongue and lips to itch    Sulfa (sulfonamide antibiotics) Other (See Comments)     Past Medical History:   Diagnosis Date    Asthma     Eye injury at age 19    hit in os     General anesthetics causing adverse effect in therapeutic use     GERD (gastroesophageal reflux disease)     Iron deficiency anemia     Sickle cell trait 2019    Sjogren's disease      Past Surgical History:   Procedure Laterality Date     SECTION      x4    COLONOSCOPY N/A 2019    Procedure: COLONOSCOPY;  Surgeon: Karri Barragan MD;  Location: [facility] ([address] [place] [address]);  Service: Endoscopy;  Laterality: N/A;    MYOMECTOMY      TUBAL LIGATION      done during myometomy surgery.    upper gi  2016     Family History   Problem Relation Age of Onset    Cancer Mother         Lung/Cervical    Cancer Maternal Grandmother         Breast/Cervical    Cancer Other         Breast     Asthma Child     Glaucoma Father     Diabetes Father     No Known Problems Sister     No Known Problems Brother     No Known Problems Maternal Aunt     No Known Problems Maternal Uncle     No Known Problems Paternal Aunt     No Known Problems Paternal Uncle     No Known Problems Maternal Grandfather     No Known Problems Paternal Grandmother     No Known Problems Paternal Grandfather     Emphysema  Neg Hx     Colon cancer Neg Hx     Stomach cancer Neg Hx     Esophageal cancer Neg Hx     Ulcerative colitis Neg Hx     Crohn's disease Neg Hx     Irritable bowel syndrome Neg Hx     Celiac disease Neg Hx     Amblyopia Neg Hx     Blindness Neg Hx     Cataracts Neg Hx     Hypertension Neg Hx     Macular degeneration Neg Hx     Retinal detachment Neg Hx     Strabismus Neg Hx     Stroke Neg Hx     Thyroid disease Neg Hx      Social History     Tobacco Use    Smoking status: Never    Smokeless tobacco: Never   Substance Use Topics    Alcohol use: No     Alcohol/week: 0.0 standard drinks    Drug use: No     Review of Systems   Constitutional:  Negative for fever.   HENT:  Negative for sore throat.    Respiratory:  Negative for shortness of breath.    Cardiovascular:  Negative for chest pain.   Gastrointestinal:  Positive for abdominal pain, nausea and vomiting.   Genitourinary:  Negative for dysuria.   Musculoskeletal:  Negative for back pain.   Skin:  Negative for rash.   Neurological:  Negative for weakness.   Hematological:  Does not bruise/bleed easily.     Physical Exam     Initial Vitals [11/09/22 2143]   BP Pulse Resp Temp SpO2   123/65 105 20 100 °F (37.8 °C) 100 %      MAP       --         Physical Exam    Constitutional: Vital signs are normal. She appears well-developed and well-nourished.   HENT:   Head: Normocephalic and atraumatic.   Right Ear: Hearing normal.   Left Ear: Hearing normal.   Eyes: Conjunctivae are normal.   Cardiovascular:  Normal rate and regular rhythm.           Pulmonary/Chest:   Clear on exam   Abdominal: Abdomen is soft. Bowel sounds are normal.   Moderate tenderness, lower abdomen primarily over the suprapubic area and the adnexa, no rebound, no guarding   Musculoskeletal:         General: Normal range of motion.     Neurological: She is alert and oriented to person, place, and time.   Skin: Skin is warm and intact.   Psychiatric: She has a normal mood and affect. Her speech is normal  and behavior is normal. Cognition and memory are normal.       ED Course   Procedures  Labs Reviewed   CBC W/ AUTO DIFFERENTIAL - Abnormal; Notable for the following components:       Result Value    RBC 3.25 (*)     Hemoglobin 9.8 (*)     Hematocrit 29.5 (*)     Gran # (ANC) 8.1 (*)     Gran % 81.3 (*)     Lymph % 11.9 (*)     All other components within normal limits   COMPREHENSIVE METABOLIC PANEL - Abnormal; Notable for the following components:    Sodium 131 (*)     CO2 21 (*)     Total Protein 9.8 (*)     Albumin 3.0 (*)     Alkaline Phosphatase 28 (*)     Anion Gap 6 (*)     All other components within normal limits   URINALYSIS, REFLEX TO URINE CULTURE - Abnormal; Notable for the following components:    Leukocytes, UA Trace (*)     All other components within normal limits    Narrative:     Specimen Source->Urine   URINALYSIS MICROSCOPIC - Abnormal; Notable for the following components:    Bacteria Moderate (*)     All other components within normal limits    Narrative:     Specimen Source->Urine   ISTAT PROCEDURE - Abnormal; Notable for the following components:    POC Hematocrit 33 (*)     All other components within normal limits   LIPASE   POCT URINE PREGNANCY   ISTAT CHEM8          Imaging Results              US Pelvis Comp with Transvag NON-OB (xpd (Final result)  Result time 11/10/22 03:36:15      Final result by Paul Pittman MD (11/10/22 03:36:15)                   Impression:      Simple to minimally complex cyst in the left ovary measuring 3.2 cm.  Suspected hydrosalpinx identified on recent CT is suboptimally evaluated with ultrasound, potentially related to high positioning.  Suggest OBGYN follow-up.    Minimally complex cystic area within the lower uterine segment endometrium.  Suggest correlation for abnormal uterine bleeding and OBGYN follow-up as clinically appropriate.    Electronically signed by resident: Néstor Aviles  Date:    11/10/2022  Time:    03:14    Electronically signed  by: Paul Pittman MD  Date:    11/10/2022  Time:    03:36               Narrative:    EXAMINATION:  US PELVIS COMP WITH TRANSVAG NON-OB (XPD)    CLINICAL HISTORY:  Left adnexa pain;    TECHNIQUE:  Transabdominal sonography of the pelvis was performed, followed by transvaginal sonography to better evaluate the uterus and ovaries.    COMPARISON:  CT abdomen pelvis 11/10/2022    FINDINGS:  Uterus:    Size: 8.0 x 4.5 x 6.6 cm    Masses: Minimally complex cystic area in the lower uterine segment which likely communicates with the endometrium measuring approximately 1.4 x 1.3 x 1.8 cm.    Endometrium: Upper endometrium is normal in this pre menopausal patient, measuring 3 mm.    Right ovary:    Size: 3.5 x 2.3 x 4.1 cm    Appearance: Normal appearing follicles.    Vascular flow: Normal.    Left ovary:    Size: 4.5 x 3.3 x 2.9 cm    Appearance: Large anechoic cyst measuring up to 3.2 cm with minimal low level echoes.  Additional normal appearing follicles.  There is a tubular structure along the superior aspect of the left adnexa which is not well evaluated with ultrasound likely related to high positioning, and better evaluated on recent CT.    Vascular Flow: Normal.    Free Fluid:    Small volume free fluid.                                       CT Abdomen Pelvis With Contrast (Final result)  Result time 11/10/22 00:48:31      Final result by Paul Pittman MD (11/10/22 00:48:31)                   Impression:      Cystic lesions in the left adnexa including a 5.7 cm tubular structure in the left superior adnexa which may be related to hydrosalpinx or other cystic lesion in the left ovary.  Suggest correlation with clinical findings and consider further evaluation with pelvic ultrasound when clinically appropriate.    Retroverted uterus and 1.8 cm fluid collection in the lower endometrium or cervix.    Small volume pelvic free fluid.    Otherwise, no acute abnormality identified in the abdomen or  pelvis.    Cholelithiasis.      Electronically signed by: Paul Pittman MD  Date:    11/10/2022  Time:    00:48               Narrative:    EXAMINATION:  CT ABDOMEN PELVIS WITH CONTRAST    CLINICAL HISTORY:  LLQ abdominal pain;Suprapubic and left lower quadrant pain;    TECHNIQUE:  Low dose axial images, sagittal and coronal reformations were obtained from the lung bases to the pubic symphysis following the IV administration of 75 mL of Omnipaque 350 .  Oral contrast was not given.    COMPARISON:  Abdominal ultrasound, 05/16/2016.  CT chest, 05/06/2016.    FINDINGS:  Lower Chest:    Lung bases are clear.  Heart size is normal.    Abdomen:    Liver is normal in size and contour.  Possible mild hepatic steatosis.  No focal hepatic mass.  There are numerous small calcified gallstones layering dependently in the gallbladder.  No gallbladder wall thickening.  No intrahepatic biliary ductal dilatation.    Spleen is not enlarged.  Adrenal glands and pancreas are unremarkable.    The kidneys are symmetric.  No hydronephrosis. No asymmetric perinephric inflammatory changes.    No small bowel obstruction.  No inflammatory changes identified in bowel.  The appendix is normal.    No pneumoperitoneum or organized fluid collection.    No bulky lymphadenopathy.    Abdominal aorta is normal in caliber without significant calcific atherosclerosis.    Portal, splenic, and superior mesenteric veins are patent.    Pelvis:    Urinary bladder is decompressed and not well evaluated.  Uterus is retroverted.  There is small amount of fluid in the endometrium.  Focal fluid collection in the lower uterine segment endometrium or cervix measuring 1.8 cm in size (axial image 137).  Simple appearing cyst in the left adnexa measuring approximately 3.3 cm in size.  Multiple follicles in the right adnexa.  Tubular low-attenuation focus in the superior left adnexa measuring approximately 5.7 cm in size (axial image 123) which may be related to  hydrosalpinx or left ovarian cystic lesion.  Small volume pelvic free fluid.    Bones and soft tissues:    No aggressive osseous lesions.  Extraperitoneal soft tissues are negative for acute finding.                                       Medications   lactated ringers bolus 1,000 mL (0 mLs Intravenous Stopped 11/10/22 0327)   ondansetron injection 4 mg (4 mg Intravenous Given 11/9/22 2342)   dicyclomine injection 20 mg (20 mg Intramuscular Given 11/9/22 2341)   iohexoL (OMNIPAQUE 350) injection 100 mL (100 mLs Intravenous Given 11/10/22 0019)   morphine injection 4 mg (4 mg Intravenous Given 11/10/22 0146)     Medical Decision Making:   History:   Old Medical Records: I decided to obtain old medical records.  Old Records Summarized: records from clinic visits.  Initial Assessment:   46-year-old female with lower abdominal discomfort, nausea and vomiting  Differential Diagnosis:   Gastroenteritis, gastritis, UTI, appendicitis, diverticulitis  Clinical Tests:   Lab Tests: Ordered and Reviewed  Radiological Study: Ordered and Reviewed  ED Management:  Plan:   No acute findings on labs, CT scan shows a large cystic lesion in the left adnexa with a hydrosalpinx,   It abdominal exam, patient still moderately tender, I will get an ultrasound to evaluate for good blood flow  There were no acute findings on ultrasound to suggest ovarian abscess or ovarian torsion.   Suspect symptoms are likely secondary to the ovarian cyst.  Also incidental findings further noted.  Patient made aware and advised to call her OBGYN for follow-up.  Advised to use over-the-counter Tylenol or Motrin for pain and prescribed oxycodone for more intense pain.  Return precautions were given.  Patient stable for discharge.                        Clinical Impression:   Final diagnoses:  [N83.209] Cyst of ovary, unspecified laterality (Primary)      ED Disposition Condition    Discharge Stable          ED Prescriptions       Medication Sig Dispense Start  Date End Date Auth. Provider    oxyCODONE-acetaminophen (PERCOCET) 5-325 mg per tablet Take 1 tablet by mouth every 6 (six) hours as needed for Pain. 12 tablet 11/10/2022 -- Feroz Townsend PA-C          Follow-up Information       Follow up With Specialties Details Why Contact Info    SAGE Townsend PA-C  11/10/22 4017

## 2022-11-10 NOTE — DISCHARGE INSTRUCTIONS
Follow-up with your OBGYN  Return to the emergency department as needed    Thank you for coming to our Emergency Department today. It is important to remember that some problems are difficult to diagnose and may not be found during your Emergency Department visit. Be sure to follow up with your primary care doctor and review all labs/imaging/tests that were performed during this visit with them. Some labs/tests may be outside of the normal range and require non-emergent follow-up and further investigation to help diagnose/exclude/prevent complications or other medical conditions.    If you do not have a primary care doctor, you may contact the one listed on your discharge paperwork or you may also call the [facility] Appointment Desk at [phone] to schedule an appointment and establish care with one. It is important to your health that you have a primary care doctor.    Please take all medications as directed. All medications may potentially have side-effects and it is impossible to predict which medications may give you side-effects or what side-effects (if any) they will give you.. If you feel that you are having a negative effect or side-effect of any medication you should immediately stop taking them and seek medical attention. If you feel that you are having a life-threatening reaction call 911.    Return to the ER with any questions/concerns, new/concerning symptoms, worsening or failure to improve.     Do not drive, swim, climb to height, take a bath or make any important decisions for 24 hours if you have received any pain medications, sedatives or mood altering drugs during your ER visit.

## 2022-11-10 NOTE — ED NOTES
Manasa Hollins, a 46 y.o. female presents to the ED w/ complaint of abd pain n/v constipation x 8 days. Aao resp even and unlabored.     Triage note:  Chief Complaint   Patient presents with    Abdominal Pain     Lower ab pain, +N/V, no bm for 8 days.      Review of patient's allergies indicates:   Allergen Reactions    Sulfa dyne Shortness Of Breath     Other reaction(s): CAUSES ASTHMA ATTACK, Is not sure of the name of the medication that she is allergic to    Peanut Hives     Causes tongue and lips to itch    Sulfa (sulfonamide antibiotics) Other (See Comments)     Past Medical History:   Diagnosis Date    Asthma     Eye injury at age 19    hit in os     General anesthetics causing adverse effect in therapeutic use     GERD (gastroesophageal reflux disease)     Iron deficiency anemia     Sickle cell trait 7/11/2019    Sjogren's disease

## 2022-11-10 NOTE — FIRST PROVIDER EVALUATION
Emergency Department TeleTriage Encounter Note      CHIEF COMPLAINT    Chief Complaint   Patient presents with    Abdominal Pain     Lower ab pain, +N/V, no bm for 8 days.        VITAL SIGNS   Initial Vitals [11/09/22 2143]   BP Pulse Resp Temp SpO2   123/65 105 20 100 °F (37.8 °C) 100 %      MAP       --            ALLERGIES    Review of patient's allergies indicates:   Allergen Reactions    Sulfa dyne Shortness Of Breath     Other reaction(s): CAUSES ASTHMA ATTACK, Is not sure of the name of the medication that she is allergic to    Peanut Hives     Causes tongue and lips to itch    Sulfa (sulfonamide antibiotics) Other (See Comments)       PROVIDER TRIAGE NOTE  TeleTriage Note: Manasa Hollins, a nontoxic/well appearing, 46 y.o. female, presented to the ED with c/o lower abdominal and back pain that began 4 days ago. She then began to have N/V this morning. No BM for 8 days but patient states this is normal. Denies fevers.     All ED beds are full at present; patient notified of this status.  Patient seen and medically screened by Nurse Practitioner via teletriage. Orders initiated at triage to expedite care.  Patient is stable to return to the waiting room and will be placed in an ED bed when available.  Care will be transferred to an alternate provider when patient has been placed in an Exam Room from the [facility] for physical exam, additional orders, and disposition.  10:20 PM Jessie Suarez DNP, FNP-C        ORDERS  Labs Reviewed   CBC W/ AUTO DIFFERENTIAL   COMPREHENSIVE METABOLIC PANEL   LIPASE   URINALYSIS, REFLEX TO URINE CULTURE   ISTAT CHEM8       ED Orders (720h ago, onward)      Start Ordered     Status Ordering Provider    11/09/22 2222 11/09/22 2221  Vital signs  Every 2 hours         Ordered JESSIE SUAREZ    11/09/22 2221 11/09/22 2221  Diet NPO  Diet effective now         Ordered JESSIE SUAREZ    11/09/22 2221 11/09/22 2221  Insert peripheral IV  Once         Ordered TRAVIS  MAURI CHRISTOPHERStanislaw    11/09/22 2221 11/09/22 2221  CBC W/ AUTO DIFFERENTIAL  STAT         Ordered TRAVIS, MAURI CHRISTOPHERStanislaw    11/09/22 2221 11/09/22 2221  Comp. Metabolic Panel  STAT         Ordered TRAVIS, MAURI CHRISTOPHERStanislaw    11/09/22 2221 11/09/22 2221  ISTAT CHEM8  Once         Ordered TRAVIS, MAURI CHRISTOPHERStanislaw    11/09/22 2221 11/09/22 2221  Lipase  STAT         Ordered TRAVIS, MAURI CHRISTOPHERStanislaw    11/09/22 2221 11/09/22 2221  Urinalysis, Reflex to Urine Culture Urine, Clean Catch  STAT         Ordered TRAVIS, MAURI CHRISTOPHERStanislaw              Virtual Visit Note: The provider triage portion of this emergency department evaluation and documentation was performed via [employer], a HIPAA-compliant telemedicine application, in concert with a tele-presenter in the room. A face to face patient evaluation with one of my colleagues will occur once the patient is placed in an emergency department room.      DISCLAIMER: This note was prepared with [employer] voice recognition transcription software. Garbled syntax, mangled pronouns, and other bizarre constructions may be attributed to that software system.

## 2022-11-10 NOTE — ED NOTES
I-STAT Chem-8+ Results:   Value Reference Range   Sodium 137 136-145 mmol/L   Potassium  4.1 3.5-5.1 mmol/L   Chloride 105  mmol/L   Ionized Calcium 1.18 1.06-1.42 mmol/L   CO2 (measured) 23 23-29 mmol/L   Glucose 95  mg/dL   BUN 7 6-30 mg/dL   Creatinine 0.6 0.5-1.4 mg/dL   Hematocrit 33 36-54%

## 2022-11-15 NOTE — PROGRESS NOTES
Subjective:       Patient ID: Manasa Hollins is a 46 y.o. female with Sjogren's syndrome on HCQ & pilocarpine    Chief Complaint: No chief complaint on file.  Has not gotten a PCP yet.     45 yo lady w Sjogren's last seen on  10/12/21. She cx or NS several appointments  She has been on HCQ & pilocarpine chronically.  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  She also has with IgG2 deficiency, low IgM and recurrent ear & sinus infections.     Returns for f/u. Her SjS complaints are fairly stable, but she feels she could do better with her dry mouth. She is taking 7.5 mg pilocarpine tid but lately feels more dryness. No new dental issues. Dry eyes stable. Uses OTC eye drops  Also on  mg/d. Getting eyes checked periodically. Has appointment 12/5.  Denies joint pain. Denies true Raynaud's, tight skin, pericarditis, photosensitivity, miscarriages (but had one premature birth at 26.5 weeks) & thromboses.     Sleep is better; fatigue continues. Has chronic anemia.  Has vasomotor sxs of menopause, but menses not prolonged.     Chronically runs low alk phos, but other than fibro type sxs no compelling evidence for HPP.    Pertinent hx from previous visit:   40 yr old lady with chronic sinusitis with dry nose, mouth and & dry eyes (keratitis documented-photo displayed on 10/12/15 visit) initially sent to rule out Sjogren's after she requested testing and was found to have a +MANISHA 1:250 sp with +SSA & +SSB. Found to have non tender parotid gland enlargement.  She was previously begun on pilocarpine 5 mg qid  Sxs remain the same--she minimizes them. She did not check with pulmonary as she was admitted in November with fever and bilateral otomastoiditis. She is now improved. Gets does get frequent dental cleaning     Current Outpatient Medications   Medication Sig Dispense Refill    acetaminophen (TYLENOL  ARTHRITIS ORAL) Take by mouth.      albuterol (PROVENTIL/VENTOLIN HFA) 90 mcg/actuation inhaler Inhale 2 puffs into the lungs every 4 (four) hours as needed for Wheezing or Shortness of Breath. 18 g 3    albuterol (PROVENTIL/VENTOLIN HFA) 90 mcg/actuation inhaler Inhale 2 puffs into the lungs every 4 (four) hours as needed for Wheezing or Shortness of Breath. 18 g 3    ascorbic acid/multivit-min (EMERGEN-C ORAL) Take by mouth.      azelastine (ASTELIN) 137 mcg (0.1 %) nasal spray SPRAY 2 SPRAYS IN EACH NOSTRIL TWICE A DAY 30 mL 11    CARBOXYMETHYLCELLULOSE SODIUM (REFRESH TEARS OPHT) Apply 2 drops to eye as needed.       cyclobenzaprine (FLEXERIL) 5 MG tablet Take 1-2 tablets (5-10 mg total) by mouth 3 (three) times daily as needed for Muscle spasms. 30 tablet 0    fluoride, sodium, (PREVIDENT) 0.2 % Soln USE AS DIRECTED      fluticasone propionate (FLONASE) 50 mcg/actuation nasal spray SPRAY 2 SPRAYS IN EACH NOSTRIL TWICE A DAY 32 mL 11    hydrOXYchloroQUINE (PLAQUENIL) 200 mg tablet TAKE 2 TABLETS BY MOUTH ONCE DAILY 60 tablet 8    immun glob G,IgG,-pro-IgA 0-50 (HIZENTRA) 4 gram/20 mL (20 %) Soln Inject 60 mLs (12 g total) into the skin every 7 days. 240 mL 11    LIDOcaine-prilocaine (EMLA) cream Apply topically as needed. 30 g 4    mv,calcium,min/iron/folic/vitK (ONE-A-DAY WOMEN'S COMPLETE ORAL) Take by mouth.      omeprazole (PRILOSEC) 40 MG capsule Take 1 capsule (40 mg total) by mouth every morning. 90 capsule 3    ondansetron (ZOFRAN) 4 MG tablet Take 1 tablet (4 mg total) by mouth every 6 (six) hours as needed for Nausea. 12 tablet 0    oxyCODONE-acetaminophen (PERCOCET) 5-325 mg per tablet Take 1 tablet by mouth every 6 (six) hours as needed for Pain. 12 tablet 0    pilocarpine HCl (SALAGEN) 7.5 mg tablet Take 1 tablet (7.5 mg total) by mouth 4 (four) times daily. 120 tablet 2    saliva stimulant comb. no.3 (BIOTENE) Spry by Mucous Membrane route as needed.       SALIVA SUBSTITUTION COMBO NO.9 (BIOTENE DRY  MOUTH ORAL RINSE MM) by Mucous Membrane route as needed.       triamcinolone acetonide 0.1% (KENALOG) 0.1 % ointment Apply topically 2 (two) times daily. 80 g 3    cycloSPORINE (RESTASIS) 0.05 % ophthalmic emulsion Place 1 drop into both eyes 2 (two) times daily. 60 vial 5     No current facility-administered medications for this visit.           Review of Systems   Constitutional:  Positive for diaphoresis and fatigue. Negative for fever and unexpected weight change.   HENT:  Positive for trouble swallowing. Negative for mouth sores, sore throat and tinnitus.         Dry mouth   Eyes:  Negative for photophobia, redness and visual disturbance.        Dry eyes   Respiratory:  Positive for cough (dry; all day) and shortness of breath. Negative for choking and chest tightness.    Cardiovascular: Negative.  Negative for chest pain, palpitations and leg swelling.   Gastrointestinal:  Positive for constipation (off linzess--ran out;). Negative for abdominal distention, abdominal pain, blood in stool, diarrhea, nausea and vomiting.        Heartburn   Endocrine: Positive for cold intolerance.   Genitourinary: Negative.  Negative for dysuria, frequency, genital sores, hematuria and menstrual problem.        Still menstruating but having hot flushes & night sweats.    Musculoskeletal:  Positive for arthralgias (R trochanteric bursitis; on & off). Negative for back pain, joint swelling, myalgias, neck pain and neck stiffness.   Skin:  Negative for color change and rash.   Allergic/Immunologic: Negative.    Neurological:  Positive for headaches. Negative for dizziness, syncope, weakness, light-headedness and numbness.   Hematological: Negative.  Negative for adenopathy. Does not bruise/bleed easily.   Psychiatric/Behavioral:  Negative for dysphoric mood (in the past) and sleep disturbance. The patient is nervous/anxious.         Gets curran. Wants to cry but doesn't have tears.        Review of patient's allergies indicates:    Allergen Reactions    Sulfa dyne      Other reaction(s): CAUSES ASTHMA ATTACK         Past Surgical History:   Procedure Laterality Date     SECTION      x4    COLONOSCOPY N/A 2019    Procedure: COLONOSCOPY;  Surgeon: Karri Barragan MD;  Location: [facility] ([address] [place] [address]);  Service: Endoscopy;  Laterality: N/A;    MYOMECTOMY      TUBAL LIGATION      done during myometomy surgery.    upper gi  2016         Family History   Problem Relation Age of Onset    Cancer Mother         Lung/Cervical    Cancer Maternal Grandmother         Breast/Cervical    Cancer Other         Breast     Asthma Child     Glaucoma Father     Diabetes Father     No Known Problems Sister     No Known Problems Brother     No Known Problems Maternal Aunt     No Known Problems Maternal Uncle     No Known Problems Paternal Aunt     No Known Problems Paternal Uncle     No Known Problems Maternal Grandfather     No Known Problems Paternal Grandmother     No Known Problems Paternal Grandfather     Emphysema Neg Hx     Colon cancer Neg Hx     Stomach cancer Neg Hx     Esophageal cancer Neg Hx     Ulcerative colitis Neg Hx     Crohn's disease Neg Hx     Irritable bowel syndrome Neg Hx     Celiac disease Neg Hx     Amblyopia Neg Hx     Blindness Neg Hx     Cataracts Neg Hx     Hypertension Neg Hx     Macular degeneration Neg Hx     Retinal detachment Neg Hx     Strabismus Neg Hx     Stroke Neg Hx     Thyroid disease Neg Hx    Mom & Dad both had sickle cell trait.  Mom developed depression.   Mom  with lung cancer (non smoker) & had cervical cancer and another cancer as well.  Has 4 sons (ages 17 -27).    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  Was 206 lb on 8/13/18  Was 205 lb 6.4 oz on 2/8/18  Was 208 lbs 14.4 oz on 3/28/17  Was 198 lbs 12.8 oz on 11/22/16.  Was 191 lbs 6.4 oz on 7/21/16.   Physical Exam   Constitutional: She is oriented to person, place, and time. She appears obese. No distress.   Rocking back & forth;    HENT:   Head: Normocephalic and atraumatic.   Mouth/Throat: Oropharynx is clear and moist. No oropharyngeal exudate.   No facial rashes  Parotids minimally enlarged &, non tender  Lacrimals minimally enlarged  Decreased  moisture of oral mucosa   Teeth in good repair  No oral ulcers     Eyes: Pupils are equal, round, and reactive to light. Conjunctivae are normal. Right eye exhibits no discharge. Left eye exhibits no discharge. No scleral icterus.   Neck: No JVD present. No tracheal deviation present. No thyromegaly present.   Cardiovascular: Normal rate, regular rhythm and normal heart sounds. Exam reveals no gallop and no friction rub.   No murmur heard.  Pulmonary/Chest: Breath sounds normal. No respiratory distress. She has no wheezes. She has no rales. She exhibits no tenderness.   Abdominal: Soft. Bowel sounds are normal. She exhibits no distension and no mass. There is no splenomegaly or hepatomegaly. There is no abdominal tenderness. There is no rebound and no guarding.   Musculoskeletal:         General: Tenderness (bilateral SI jt TTP 4/5 padmini) present. Normal range of motion.      Cervical back: Neck supple.      Comments: Cspine FROM no tenderness  Tspine FROM no tenderness  Lspine FROM no tenderness.  TMJ: unremarkable  Shoulders: FROM; no synovitis;  Elbows: FROM; no synovitis; no tophi or nodules  Wrists: FROM; no synovitis;    MCPs: FROM; Mild enlargement of " 1st MCP on left; no synovitis; no metacarpalgia;  ok;  PIPs: FROM; no synovitis; no tenderness of any joints.   DIPs: FROM; no synovitis;   HIPS: FROM;   Knees: FROM; bilateral crepitus; no synovitis; no instability;  Ankles: FROM: no synovitis   Toes: ok; no metatarsalgia       Lymphadenopathy:     She has no cervical adenopathy.   Neurological: She is alert and oriented to person, place, and time. She has normal reflexes. No cranial nerve deficit. Gait normal.   Proximal and distal muscle strength 5/5 .   Skin: Skin is warm and dry. She is not diaphoretic.   Psychiatric: Her behavior is normal. Mood, memory, affect and judgment normal.   But rocking back & forth attributed to gyn pain due to cysts   Vitals reviewed.    LABS:  11/9/22: CBC Hg 9.8; Ht 29.5; CMP Na 131; AlkP 28; TP 9.8; Alb 3.0  2/17/21: CRP 3.5; CBC Hg 9.9; Ht 31.7; CMP TP 9.6; alb 3.2;   1/14/20: ESR 48 (36); CRP 3.2;  Hg 9.9; Ht 31.7;  CMP Na 135; TP 10.8; alb 3.2; ; Vit D 55; --last  11/19/19: TSH ok;   3/28/19: ; CRP 5.7; CBC Hg 9.5; Ht 30.2;  Sat iron 10 %(20); CMP TP 10.5; Alb 3.0; MANISHA >1:2560 H/sp; +SSA+SSB; Ig  3/13/19: Vit D 21; Vit B12 ok;   12/5/18: CBC Hg 9.1 Ht 29.1; CMP TP 9.9; alb 3.1;  IgG 4488 (1600); IgM 44 (50); IgA 459 (350)  12/18/17: CMP TP 10.0; Alb. 3.0;  11/22/17: Hg 8.9; Ht 28.1  4/27/17: ; CRP 4.6; Hg 9.4; Ht 28.9; Na 133; TP 10.1; Alb. 2.6;   12/21/16: Hg 9.8; Ht 29.6; Na 135; Alb. 2.9;   8/24/16: ; CRP 3.9; Hg 8.2; Ht 25.2; CMP TP 9.4; Alb. 2.4; Ca 8.4; CPK 88; SPE elevated TP & gammaglobulins with oligoclonal bands.   7/2/16: ;   5/3/16: Hg 8.9; Ht 27.5; IgG1 3490 (1140); IgG2 56 (150);  4/21/16: ; CRP 6.7; Hg 9.1; Ht 28.7; CMP Alb. 2.8; TP 9.4; UA ok; U pr/cnne .37 (.20)  3/11/16: CMP Ca 8.2; Alb. 1.4  3/7/16: UA 3+ pr; 2+ OB; * RBCs 16 WBC, 9 HC  12/1/15: Hg 7.3; Ht 22.5; K+ 3.4; Ca 8.6;   8/18/15: UA ok;  8/12/15: LAC neg; aCLA neg; C3, C4 ok; dsDNA neg;       Imaging:      2/17/21: Bilateral knees: personally viewed: ok  2/17/21: Bilateral hips: personally viewed: mild OA; ? phleboliths  2/17/21: CXR: personally viewed: ok;  7/21/16: Bilateral knee x-rays: personally reviewed: very mild bilateral DJD.  5/2/16: CT chest: personally reviewed: unremarkable.  4/21/16: CXR: personally reviewed: prob. Ok; RLL opacity c/w scarring  10/12/15: CXR: personally reviewed with patient: OK  10/12/15: Bilateral Hands: personally reviewed with patient: ok  Assessment:   Sjogren's syndrome -- stable w mild increase in oral dryness    Sicca sxs with dry mouth with dental carries, dry eyes & dry mouth     Response to pilocarpine      But cough & mild SOB following--patient opts to continue    Bilateral parotid gland swelling    +MANISHA 1:2560; +SSA; +SSB: TP 10.5;     RF neg; LAC, aCLA neg (premature birth at 26.5 wks).     C3, C4, UA ok; elevated IgG 4489 (1600); IgA 495 (350);     + FH SLE (sister)    On  mg/d & pilocarpine 7.5 mg qid     Hx of Joint pain multiple sites--largely resovled   L lateral epicondylitis   L knee   R thigh hip area    Anemia:  NCNC--chronic; probably mostly chronic disease (according to ) with mild iron deficiency   Hg electrophoresis A2   Lowest Ht 18.1; highest 34.6   Mom & Dad had sickle cell trait   Some component of iron deficiency    retic ok; MMA ok;    G6PD ok; hapto not low    Vitamin D insufficiency--treated.    Persistently low AlkP  since 2014   Min 21; Max 40   R/O hypophosphatasia--doubt but has fibro pain    No abnormal stature; no bowed legs; hx some dental issues..     Some features of FMS   Hx of insomnia/fatigue/TTP     Bilateral knee pain/weakness   R knee worse lately   Imaging with minimal bilateral OA    Recurrent ENT & possibly pulmonary infections associated with IgG2 deficiency   S/P bilateral otomastoiditis with fever hospitalized 11/15    Possibly parotidomegaly played a role.   Pulmonary issues:     S/P CAP 3/17/16:     Also with  "chronic cough & SOB     She's had "allergic cough" in past.     Salagen as a culprit ruled out     CT chest 5/2/16: unremarkable   Inadequate response to pneumovax    IgG2 deficiency & low IgM   Was on Hizentra      Reactive Airways/Asthma    Perimenopausal      Obesity-mildly improved      Plan:   Discussed switching pilocarpine to 10 tid (same total dose) & if inadequate response switch ot cevemilen.  Stay on  mg/d w eye exams both for HCQ & dry eyes.   Continue OTC for dry mouth and eyes prn  Will get some preliminary labs for hypophosphatasia.  Weight loss.   Continue daily exercise as she is doing.     RTC 6 months or prn.        "

## 2022-11-22 ENCOUNTER — OFFICE VISIT (OUTPATIENT)
Dept: RHEUMATOLOGY | Facility: CLINIC | Age: 47
End: 2022-11-22
Payer: COMMERCIAL

## 2022-11-22 VITALS
HEIGHT: 63 IN | HEART RATE: 90 BPM | DIASTOLIC BLOOD PRESSURE: 72 MMHG | BODY MASS INDEX: 34.73 KG/M2 | SYSTOLIC BLOOD PRESSURE: 111 MMHG | WEIGHT: 196 LBS

## 2022-11-22 DIAGNOSIS — Z79.899 LONG TERM USE OF DRUG: ICD-10-CM

## 2022-11-22 DIAGNOSIS — R74.8 LOW SERUM ALKALINE PHOSPHATASE: ICD-10-CM

## 2022-11-22 DIAGNOSIS — R68.2 DRY MOUTH: ICD-10-CM

## 2022-11-22 DIAGNOSIS — M35.09 SJOGREN'S SYNDROME WITH OTHER ORGAN INVOLVEMENT: Primary | ICD-10-CM

## 2022-11-22 DIAGNOSIS — E66.9 OBESITY (BMI 30.0-34.9): ICD-10-CM

## 2022-11-22 DIAGNOSIS — Z55.9 EDUCATIONAL CIRCUMSTANCE: ICD-10-CM

## 2022-11-22 DIAGNOSIS — Z79.899 LONG-TERM USE OF HYDROXYCHLOROQUINE: ICD-10-CM

## 2022-11-22 PROCEDURE — 1160F RVW MEDS BY RX/DR IN RCRD: CPT | Mod: CPTII,S$GLB,, | Performed by: INTERNAL MEDICINE

## 2022-11-22 PROCEDURE — 3008F BODY MASS INDEX DOCD: CPT | Mod: CPTII,S$GLB,, | Performed by: INTERNAL MEDICINE

## 2022-11-22 PROCEDURE — 3078F DIAST BP <80 MM HG: CPT | Mod: CPTII,S$GLB,, | Performed by: INTERNAL MEDICINE

## 2022-11-22 PROCEDURE — 3074F SYST BP LT 130 MM HG: CPT | Mod: CPTII,S$GLB,, | Performed by: INTERNAL MEDICINE

## 2022-11-22 PROCEDURE — 99999 PR PBB SHADOW E&M-EST. PATIENT-LVL V: ICD-10-PCS | Mod: PBBFAC,,, | Performed by: INTERNAL MEDICINE

## 2022-11-22 PROCEDURE — 3074F PR MOST RECENT SYSTOLIC BLOOD PRESSURE < 130 MM HG: ICD-10-PCS | Mod: CPTII,S$GLB,, | Performed by: INTERNAL MEDICINE

## 2022-11-22 PROCEDURE — 3008F PR BODY MASS INDEX (BMI) DOCUMENTED: ICD-10-PCS | Mod: CPTII,S$GLB,, | Performed by: INTERNAL MEDICINE

## 2022-11-22 PROCEDURE — 1159F PR MEDICATION LIST DOCUMENTED IN MEDICAL RECORD: ICD-10-PCS | Mod: CPTII,S$GLB,, | Performed by: INTERNAL MEDICINE

## 2022-11-22 PROCEDURE — 99214 PR OFFICE/OUTPT VISIT, EST, LEVL IV, 30-39 MIN: ICD-10-PCS | Mod: S$GLB,,, | Performed by: INTERNAL MEDICINE

## 2022-11-22 PROCEDURE — 99214 OFFICE O/P EST MOD 30 MIN: CPT | Mod: S$GLB,,, | Performed by: INTERNAL MEDICINE

## 2022-11-22 PROCEDURE — 99999 PR PBB SHADOW E&M-EST. PATIENT-LVL V: CPT | Mod: PBBFAC,,, | Performed by: INTERNAL MEDICINE

## 2022-11-22 PROCEDURE — 1160F PR REVIEW ALL MEDS BY PRESCRIBER/CLIN PHARMACIST DOCUMENTED: ICD-10-PCS | Mod: CPTII,S$GLB,, | Performed by: INTERNAL MEDICINE

## 2022-11-22 PROCEDURE — 1159F MED LIST DOCD IN RCRD: CPT | Mod: CPTII,S$GLB,, | Performed by: INTERNAL MEDICINE

## 2022-11-22 PROCEDURE — 3078F PR MOST RECENT DIASTOLIC BLOOD PRESSURE < 80 MM HG: ICD-10-PCS | Mod: CPTII,S$GLB,, | Performed by: INTERNAL MEDICINE

## 2022-11-22 RX ORDER — PILOCARPINE HYDROCHLORIDE 5 MG/1
10 TABLET, FILM COATED ORAL 3 TIMES DAILY
Qty: 120 TABLET | Refills: 11 | Status: SHIPPED | OUTPATIENT
Start: 2022-11-22 | End: 2023-07-31 | Stop reason: ALTCHOICE

## 2022-11-22 SDOH — SOCIAL DETERMINANTS OF HEALTH (SDOH): PROBLEMS RELATED TO EDUCATION AND LITERACY, UNSPECIFIED: Z55.9

## 2022-11-22 NOTE — PROGRESS NOTES
Rapid3 Question Responses and Scores 11/21/2022   MDHAQ Score 0.4   Psychologic Score 3.3   Pain Score 10   When you awakened in the morning OVER THE LAST WEEK, did you feel stiff? Yes   If Yes, please indicate the number of hours until you are as limber as you will be for the day 1   Fatigue Score 8   Global Health Score 0.5   RAPID3 Score 3.94     Answers submitted by the patient for this visit:  Rheumatology Questionnaire (Submitted on 11/21/2022)  fever: Yes  eye redness: No  mouth sores: No  headaches: Yes  shortness of breath: Yes  chest pain: No  trouble swallowing: Yes  diarrhea: No  constipation: Yes  unexpected weight change: No  genital sore: No  dysuria: No  During the last 3 days, have you had a skin rash?: No  Bruises or bleeds easily: No  cough: Yes

## 2022-11-22 NOTE — PATIENT INSTRUCTIONS
Keep up with daily exercise.     Please get a primary care physician.  Dr. Jh Kendall is recommended and is in your area.  Phone: [phone]    Another drug for dry mouth is evoxac (cevimelene).  Let us know if you want us to switch you from salagen (pilocarpine)

## 2022-11-23 ENCOUNTER — OFFICE VISIT (OUTPATIENT)
Dept: URGENT CARE | Facility: CLINIC | Age: 47
End: 2022-11-23
Payer: COMMERCIAL

## 2022-11-23 VITALS
RESPIRATION RATE: 18 BRPM | DIASTOLIC BLOOD PRESSURE: 72 MMHG | HEIGHT: 63 IN | BODY MASS INDEX: 34.55 KG/M2 | SYSTOLIC BLOOD PRESSURE: 114 MMHG | TEMPERATURE: 98 F | OXYGEN SATURATION: 98 % | HEART RATE: 74 BPM | WEIGHT: 195 LBS

## 2022-11-23 DIAGNOSIS — R11.10 VOMITING, UNSPECIFIED VOMITING TYPE, UNSPECIFIED WHETHER NAUSEA PRESENT: Primary | ICD-10-CM

## 2022-11-23 PROCEDURE — 3008F BODY MASS INDEX DOCD: CPT | Mod: CPTII,S$GLB,, | Performed by: FAMILY MEDICINE

## 2022-11-23 PROCEDURE — 3078F PR MOST RECENT DIASTOLIC BLOOD PRESSURE < 80 MM HG: ICD-10-PCS | Mod: CPTII,S$GLB,, | Performed by: FAMILY MEDICINE

## 2022-11-23 PROCEDURE — 3074F PR MOST RECENT SYSTOLIC BLOOD PRESSURE < 130 MM HG: ICD-10-PCS | Mod: CPTII,S$GLB,, | Performed by: FAMILY MEDICINE

## 2022-11-23 PROCEDURE — 1159F PR MEDICATION LIST DOCUMENTED IN MEDICAL RECORD: ICD-10-PCS | Mod: CPTII,S$GLB,, | Performed by: FAMILY MEDICINE

## 2022-11-23 PROCEDURE — 99214 PR OFFICE/OUTPT VISIT, EST, LEVL IV, 30-39 MIN: ICD-10-PCS | Mod: 25,S$GLB,, | Performed by: FAMILY MEDICINE

## 2022-11-23 PROCEDURE — 96372 PR INJECTION,THERAP/PROPH/DIAG2ST, IM OR SUBCUT: ICD-10-PCS | Mod: S$GLB,,, | Performed by: FAMILY MEDICINE

## 2022-11-23 PROCEDURE — 1159F MED LIST DOCD IN RCRD: CPT | Mod: CPTII,S$GLB,, | Performed by: FAMILY MEDICINE

## 2022-11-23 PROCEDURE — 99214 OFFICE O/P EST MOD 30 MIN: CPT | Mod: 25,S$GLB,, | Performed by: FAMILY MEDICINE

## 2022-11-23 PROCEDURE — 1160F PR REVIEW ALL MEDS BY PRESCRIBER/CLIN PHARMACIST DOCUMENTED: ICD-10-PCS | Mod: CPTII,S$GLB,, | Performed by: FAMILY MEDICINE

## 2022-11-23 PROCEDURE — 3074F SYST BP LT 130 MM HG: CPT | Mod: CPTII,S$GLB,, | Performed by: FAMILY MEDICINE

## 2022-11-23 PROCEDURE — 96372 THER/PROPH/DIAG INJ SC/IM: CPT | Mod: S$GLB,,, | Performed by: FAMILY MEDICINE

## 2022-11-23 PROCEDURE — 1160F RVW MEDS BY RX/DR IN RCRD: CPT | Mod: CPTII,S$GLB,, | Performed by: FAMILY MEDICINE

## 2022-11-23 PROCEDURE — 3078F DIAST BP <80 MM HG: CPT | Mod: CPTII,S$GLB,, | Performed by: FAMILY MEDICINE

## 2022-11-23 PROCEDURE — 3008F PR BODY MASS INDEX (BMI) DOCUMENTED: ICD-10-PCS | Mod: CPTII,S$GLB,, | Performed by: FAMILY MEDICINE

## 2022-11-23 RX ORDER — METRONIDAZOLE 500 MG/1
500 TABLET ORAL
COMMUNITY
Start: 2022-11-22 | End: 2022-11-29

## 2022-11-23 RX ORDER — ONDANSETRON 4 MG/1
4 TABLET, ORALLY DISINTEGRATING ORAL EVERY 8 HOURS PRN
Qty: 9 TABLET | Refills: 0 | Status: SHIPPED | OUTPATIENT
Start: 2022-11-23 | End: 2022-11-26

## 2022-11-23 RX ORDER — DOXYCYCLINE HYCLATE 100 MG
100 TABLET ORAL
COMMUNITY
Start: 2022-11-22 | End: 2022-11-29

## 2022-11-23 RX ORDER — ONDANSETRON 2 MG/ML
4 INJECTION INTRAMUSCULAR; INTRAVENOUS
Status: COMPLETED | OUTPATIENT
Start: 2022-11-23 | End: 2022-11-23

## 2022-11-23 RX ADMIN — ONDANSETRON 4 MG: 2 INJECTION INTRAMUSCULAR; INTRAVENOUS at 09:11

## 2022-11-23 NOTE — LETTER
November 23, 2022      [facility]  [address] [facility], [address]  [place] [address]  Phone: [phone]  Fax: [phone]       Patient: Manasa Hollins   YOB: 1975  Date of Visit: 11/23/2022    To Whom It May Concern:    Alberto Hollins  was at [facility] on 11/23/2022. The patient may return to work/school on 11/26/2022 with no restrictions. If you have any questions or concerns, or if I can be of further assistance, please do not hesitate to contact me.    Sincerely,    Sangeetha Cage NP
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
## 2022-12-05 ENCOUNTER — OFFICE VISIT (OUTPATIENT)
Dept: OPTOMETRY | Facility: CLINIC | Age: 47
End: 2022-12-05
Payer: COMMERCIAL

## 2022-12-05 ENCOUNTER — LAB VISIT (OUTPATIENT)
Dept: LAB | Facility: HOSPITAL | Age: 47
End: 2022-12-05
Attending: INTERNAL MEDICINE
Payer: COMMERCIAL

## 2022-12-05 DIAGNOSIS — R74.8 LOW SERUM ALKALINE PHOSPHATASE: ICD-10-CM

## 2022-12-05 DIAGNOSIS — M35.00 SJOGREN'S SYNDROME, WITH UNSPECIFIED ORGAN INVOLVEMENT: Primary | ICD-10-CM

## 2022-12-05 DIAGNOSIS — M35.09 SJOGREN'S SYNDROME WITH OTHER ORGAN INVOLVEMENT: ICD-10-CM

## 2022-12-05 DIAGNOSIS — H04.123 DRY EYE SYNDROME, BILATERAL: ICD-10-CM

## 2022-12-05 DIAGNOSIS — Z13.5 SCREENING FOR EYE CONDITION: ICD-10-CM

## 2022-12-05 DIAGNOSIS — H52.4 MYOPIA WITH PRESBYOPIA OF BOTH EYES: ICD-10-CM

## 2022-12-05 DIAGNOSIS — Z79.899 LONG-TERM USE OF PLAQUENIL: ICD-10-CM

## 2022-12-05 DIAGNOSIS — H26.9 CORTICAL CATARACT OF LEFT EYE: ICD-10-CM

## 2022-12-05 DIAGNOSIS — H52.13 MYOPIA WITH PRESBYOPIA OF BOTH EYES: ICD-10-CM

## 2022-12-05 LAB
ALBUMIN SERPL BCP-MCNC: 3.1 G/DL (ref 3.5–5.2)
ALP SERPL-CCNC: 31 U/L (ref 55–135)
ALT SERPL W/O P-5'-P-CCNC: 17 U/L (ref 10–44)
ANION GAP SERPL CALC-SCNC: 6 MMOL/L (ref 8–16)
ANTI-SSA ANTIBODY: 5.37 RATIO (ref 0–0.99)
ANTI-SSA INTERPRETATION: POSITIVE
AST SERPL-CCNC: 21 U/L (ref 10–40)
BASOPHILS # BLD AUTO: 0.02 K/UL (ref 0–0.2)
BASOPHILS NFR BLD: 0.4 % (ref 0–1.9)
BILIRUB SERPL-MCNC: 0.4 MG/DL (ref 0.1–1)
BUN SERPL-MCNC: 11 MG/DL (ref 6–20)
CALCIUM SERPL-MCNC: 9.1 MG/DL (ref 8.7–10.5)
CHLORIDE SERPL-SCNC: 105 MMOL/L (ref 95–110)
CO2 SERPL-SCNC: 24 MMOL/L (ref 23–29)
CREAT SERPL-MCNC: 0.7 MG/DL (ref 0.5–1.4)
CRP SERPL-MCNC: 1.7 MG/L (ref 0–8.2)
DIFFERENTIAL METHOD: ABNORMAL
EOSINOPHIL # BLD AUTO: 0.1 K/UL (ref 0–0.5)
EOSINOPHIL NFR BLD: 1.5 % (ref 0–8)
ERYTHROCYTE [DISTWIDTH] IN BLOOD BY AUTOMATED COUNT: 14.3 % (ref 11.5–14.5)
ERYTHROCYTE [SEDIMENTATION RATE] IN BLOOD BY PHOTOMETRIC METHOD: 49 MM/HR (ref 0–36)
EST. GFR  (NO RACE VARIABLE): >60 ML/MIN/1.73 M^2
GLUCOSE SERPL-MCNC: 91 MG/DL (ref 70–110)
HCT VFR BLD AUTO: 31.9 % (ref 37–48.5)
HGB BLD-MCNC: 9.8 G/DL (ref 12–16)
IMM GRANULOCYTES # BLD AUTO: 0.02 K/UL (ref 0–0.04)
IMM GRANULOCYTES NFR BLD AUTO: 0.4 % (ref 0–0.5)
LYMPHOCYTES # BLD AUTO: 1.4 K/UL (ref 1–4.8)
LYMPHOCYTES NFR BLD: 31.2 % (ref 18–48)
MAGNESIUM SERPL-MCNC: 1.6 MG/DL (ref 1.6–2.6)
MCH RBC QN AUTO: 28.9 PG (ref 27–31)
MCHC RBC AUTO-ENTMCNC: 30.7 G/DL (ref 32–36)
MCV RBC AUTO: 94 FL (ref 82–98)
MONOCYTES # BLD AUTO: 0.3 K/UL (ref 0.3–1)
MONOCYTES NFR BLD: 7.1 % (ref 4–15)
NEUTROPHILS # BLD AUTO: 2.7 K/UL (ref 1.8–7.7)
NEUTROPHILS NFR BLD: 59.4 % (ref 38–73)
NRBC BLD-RTO: 0 /100 WBC
PLATELET # BLD AUTO: 221 K/UL (ref 150–450)
PMV BLD AUTO: 11.2 FL (ref 9.2–12.9)
POTASSIUM SERPL-SCNC: 3.8 MMOL/L (ref 3.5–5.1)
PROT SERPL-MCNC: 9.7 G/DL (ref 6–8.4)
RBC # BLD AUTO: 3.39 M/UL (ref 4–5.4)
SODIUM SERPL-SCNC: 135 MMOL/L (ref 136–145)
WBC # BLD AUTO: 4.62 K/UL (ref 3.9–12.7)

## 2022-12-05 PROCEDURE — 83735 ASSAY OF MAGNESIUM: CPT | Performed by: INTERNAL MEDICINE

## 2022-12-05 PROCEDURE — 86140 C-REACTIVE PROTEIN: CPT | Performed by: INTERNAL MEDICINE

## 2022-12-05 PROCEDURE — 84630 ASSAY OF ZINC: CPT | Performed by: INTERNAL MEDICINE

## 2022-12-05 PROCEDURE — 92015 PR REFRACTION: ICD-10-PCS | Mod: S$GLB,,, | Performed by: OPTOMETRIST

## 2022-12-05 PROCEDURE — 80053 COMPREHEN METABOLIC PANEL: CPT | Performed by: INTERNAL MEDICINE

## 2022-12-05 PROCEDURE — 85652 RBC SED RATE AUTOMATED: CPT | Performed by: INTERNAL MEDICINE

## 2022-12-05 PROCEDURE — 86038 ANTINUCLEAR ANTIBODIES: CPT | Performed by: INTERNAL MEDICINE

## 2022-12-05 PROCEDURE — 92015 DETERMINE REFRACTIVE STATE: CPT | Mod: S$GLB,,, | Performed by: OPTOMETRIST

## 2022-12-05 PROCEDURE — 1159F MED LIST DOCD IN RCRD: CPT | Mod: CPTII,S$GLB,, | Performed by: OPTOMETRIST

## 2022-12-05 PROCEDURE — 85025 COMPLETE CBC W/AUTO DIFF WBC: CPT | Performed by: INTERNAL MEDICINE

## 2022-12-05 PROCEDURE — 36415 COLL VENOUS BLD VENIPUNCTURE: CPT | Performed by: INTERNAL MEDICINE

## 2022-12-05 PROCEDURE — 86235 NUCLEAR ANTIGEN ANTIBODY: CPT | Performed by: INTERNAL MEDICINE

## 2022-12-05 PROCEDURE — 1159F PR MEDICATION LIST DOCUMENTED IN MEDICAL RECORD: ICD-10-PCS | Mod: CPTII,S$GLB,, | Performed by: OPTOMETRIST

## 2022-12-05 PROCEDURE — 86039 ANTINUCLEAR ANTIBODIES (ANA): CPT | Performed by: INTERNAL MEDICINE

## 2022-12-05 PROCEDURE — 99999 PR PBB SHADOW E&M-EST. PATIENT-LVL III: ICD-10-PCS | Mod: PBBFAC,,, | Performed by: OPTOMETRIST

## 2022-12-05 PROCEDURE — 92014 PR EYE EXAM, EST PATIENT,COMPREHESV: ICD-10-PCS | Mod: S$GLB,,, | Performed by: OPTOMETRIST

## 2022-12-05 PROCEDURE — 99999 PR PBB SHADOW E&M-EST. PATIENT-LVL III: CPT | Mod: PBBFAC,,, | Performed by: OPTOMETRIST

## 2022-12-05 PROCEDURE — 92014 COMPRE OPH EXAM EST PT 1/>: CPT | Mod: S$GLB,,, | Performed by: OPTOMETRIST

## 2022-12-05 PROCEDURE — 86235 NUCLEAR ANTIGEN ANTIBODY: CPT | Mod: 59 | Performed by: INTERNAL MEDICINE

## 2022-12-05 PROCEDURE — 84207 ASSAY OF VITAMIN B-6: CPT | Performed by: INTERNAL MEDICINE
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
## 2022-12-05 NOTE — PATIENT INSTRUCTIONS
History of Sjogren/s syndrome, with secondary dry eye.  Intolerant to Xiidra ophthalmic solution.  Ms. Hollins uses Restasis Ophthalmic Emulsion in both eyes, with satisfactory relief of symptoms./   Additionally, previously suggested use of a medium viscosity artificial tear eyedrop in both eyes as often as desired/needed (Systane, or Refresh Optive).     Has taken 400 mg Hydroxychloroquine by mouth for approximately seven years.    Due for repeat OCT of retina at macula, and repeat Richter Visual field test (10-2)  Ms. Anthony to call to schedule OCT and HVF tests, and follow-up visit with me (Dr. Wheat) on the same dater     No evidence of bull's eye maculopathy/pigmentary changes in either eye on dilated fundus exam done today.    Myopia (nearsightedness) in each eye, with good best-corrected VA in each eye.\  Presbyopia consistent with age.   New spectacle lens Rx issued for single vision distance lenses.  Remove glasses for reading/close work      Repeat general eye exam and refraction in December, 2023, with HVF test (10-2)

## 2022-12-06 LAB
ANA PATTERN 1: NORMAL
ANA SER QL IF: POSITIVE
ANA TITR SER IF: >2560 {TITER}

## 2022-12-07 ENCOUNTER — TELEPHONE (OUTPATIENT)
Dept: OPHTHALMOLOGY | Facility: CLINIC | Age: 47
End: 2022-12-07
Payer: COMMERCIAL

## 2022-12-07 LAB
ANTI SM ANTIBODY: 0.11 RATIO (ref 0–0.99)
ANTI SM/RNP ANTIBODY: 0.08 RATIO (ref 0–0.99)
ANTI-SM INTERPRETATION: NEGATIVE
ANTI-SM/RNP INTERPRETATION: NEGATIVE
ANTI-SSA ANTIBODY: 5.37 RATIO (ref 0–0.99)
ANTI-SSA INTERPRETATION: POSITIVE
ANTI-SSB ANTIBODY: 5.61 RATIO (ref 0–0.99)
ANTI-SSB INTERPRETATION: POSITIVE
DSDNA AB SER-ACNC: ABNORMAL [IU]/ML
ZINC SERPL-MCNC: 62 UG/DL (ref 60–130)

## 2022-12-08 LAB — PYRIDOXAL SERPL-MCNC: 28 UG/L (ref 5–50)

## 2023-05-02 ENCOUNTER — OFFICE VISIT (OUTPATIENT)
Dept: FAMILY MEDICINE | Facility: CLINIC | Age: 48
End: 2023-05-02
Payer: COMMERCIAL

## 2023-05-02 VITALS
OXYGEN SATURATION: 99 % | DIASTOLIC BLOOD PRESSURE: 76 MMHG | WEIGHT: 194 LBS | BODY MASS INDEX: 34.38 KG/M2 | HEART RATE: 72 BPM | SYSTOLIC BLOOD PRESSURE: 126 MMHG | TEMPERATURE: 98 F | HEIGHT: 63 IN

## 2023-05-02 DIAGNOSIS — K59.09 CHRONIC CONSTIPATION: ICD-10-CM

## 2023-05-02 DIAGNOSIS — Z79.899 LONG-TERM USE OF PLAQUENIL: ICD-10-CM

## 2023-05-02 DIAGNOSIS — K21.9 GASTROESOPHAGEAL REFLUX DISEASE WITHOUT ESOPHAGITIS: ICD-10-CM

## 2023-05-02 DIAGNOSIS — M35.00 SJOGREN'S SYNDROME, WITH UNSPECIFIED ORGAN INVOLVEMENT: ICD-10-CM

## 2023-05-02 DIAGNOSIS — Z00.00 ANNUAL PHYSICAL EXAM: Primary | ICD-10-CM

## 2023-05-02 PROBLEM — J34.3 NASAL TURBINATE HYPERTROPHY: Status: RESOLVED | Noted: 2017-08-25 | Resolved: 2023-05-02

## 2023-05-02 PROBLEM — D57.3 SICKLE CELL TRAIT: Status: RESOLVED | Noted: 2019-07-11 | Resolved: 2023-05-02

## 2023-05-02 PROBLEM — J32.9 SINUSITIS: Status: RESOLVED | Noted: 2017-08-25 | Resolved: 2023-05-02

## 2023-05-02 PROBLEM — Z12.11 SCREENING FOR COLON CANCER: Status: RESOLVED | Noted: 2019-06-19 | Resolved: 2023-05-02

## 2023-05-02 PROBLEM — J35.2 ADENOID HYPERTROPHY: Status: RESOLVED | Noted: 2017-08-25 | Resolved: 2023-05-02

## 2023-05-02 PROCEDURE — 99999 PR PBB SHADOW E&M-EST. PATIENT-LVL IV: ICD-10-PCS | Mod: PBBFAC,,, | Performed by: FAMILY MEDICINE

## 2023-05-02 PROCEDURE — 3008F BODY MASS INDEX DOCD: CPT | Mod: CPTII,S$GLB,, | Performed by: FAMILY MEDICINE

## 2023-05-02 PROCEDURE — 3074F PR MOST RECENT SYSTOLIC BLOOD PRESSURE < 130 MM HG: ICD-10-PCS | Mod: CPTII,S$GLB,, | Performed by: FAMILY MEDICINE

## 2023-05-02 PROCEDURE — 3074F SYST BP LT 130 MM HG: CPT | Mod: CPTII,S$GLB,, | Performed by: FAMILY MEDICINE

## 2023-05-02 PROCEDURE — 3008F PR BODY MASS INDEX (BMI) DOCUMENTED: ICD-10-PCS | Mod: CPTII,S$GLB,, | Performed by: FAMILY MEDICINE

## 2023-05-02 PROCEDURE — 3078F DIAST BP <80 MM HG: CPT | Mod: CPTII,S$GLB,, | Performed by: FAMILY MEDICINE

## 2023-05-02 PROCEDURE — 1159F PR MEDICATION LIST DOCUMENTED IN MEDICAL RECORD: ICD-10-PCS | Mod: CPTII,S$GLB,, | Performed by: FAMILY MEDICINE

## 2023-05-02 PROCEDURE — 99999 PR PBB SHADOW E&M-EST. PATIENT-LVL IV: CPT | Mod: PBBFAC,,, | Performed by: FAMILY MEDICINE

## 2023-05-02 PROCEDURE — 99396 PR PREVENTIVE VISIT,EST,40-64: ICD-10-PCS | Mod: S$GLB,,, | Performed by: FAMILY MEDICINE

## 2023-05-02 PROCEDURE — 1159F MED LIST DOCD IN RCRD: CPT | Mod: CPTII,S$GLB,, | Performed by: FAMILY MEDICINE

## 2023-05-02 PROCEDURE — 3078F PR MOST RECENT DIASTOLIC BLOOD PRESSURE < 80 MM HG: ICD-10-PCS | Mod: CPTII,S$GLB,, | Performed by: FAMILY MEDICINE

## 2023-05-02 PROCEDURE — 99396 PREV VISIT EST AGE 40-64: CPT | Mod: S$GLB,,, | Performed by: FAMILY MEDICINE

## 2023-05-02 RX ORDER — HYDROXYCHLOROQUINE SULFATE 200 MG/1
400 TABLET, FILM COATED ORAL
COMMUNITY
Start: 2022-09-06 | End: 2023-06-26

## 2023-05-02 RX ORDER — LINACLOTIDE 145 UG/1
145 CAPSULE, GELATIN COATED ORAL DAILY
Qty: 90 CAPSULE | Refills: 3 | Status: ON HOLD | OUTPATIENT
Start: 2023-05-02 | End: 2024-03-21

## 2023-05-02 RX ORDER — OMEPRAZOLE 40 MG/1
40 CAPSULE, DELAYED RELEASE ORAL EVERY MORNING
Qty: 90 CAPSULE | Refills: 3 | Status: ON HOLD | OUTPATIENT
Start: 2023-05-02 | End: 2024-03-21

## 2023-05-02 NOTE — PROGRESS NOTES
Chief Complaint   Patient presents with    Annual Exam       SUBJECTIVE:   47 y.o. female for annual routine checkup.  Establishing care and new to me.    Reviewed her records.  Current Outpatient Medications   Medication Sig Dispense Refill    albuterol (PROVENTIL/VENTOLIN HFA) 90 mcg/actuation inhaler Inhale 2 puffs into the lungs every 4 (four) hours as needed for Wheezing or Shortness of Breath. 18 g 3    azelastine (ASTELIN) 137 mcg (0.1 %) nasal spray SPRAY 2 SPRAYS IN EACH NOSTRIL TWICE A DAY. 30 mL 3    cycloSPORINE (RESTASIS) 0.05 % ophthalmic emulsion Place 1 drop into both eyes 2 (two) times daily. 60 vial 5    fluoride, sodium, (PREVIDENT) 0.2 % Soln USE AS DIRECTED      fluticasone propionate (FLONASE) 50 mcg/actuation nasal spray SPRAY 2 SPRAYS IN EACH NOSTRIL TWICE A DAY 32 mL 11    hydrOXYchloroQUINE (PLAQUENIL) 200 mg tablet Take 400 mg by mouth.      immun glob G,IgG,-pro-IgA 0-50 (HIZENTRA) 4 gram/20 mL (20 %) Soln Inject 60 mLs (12 g total) into the skin every 7 days. 240 mL 11    LIDOcaine-prilocaine (EMLA) cream Apply topically as needed. 30 g 4    mv,calcium,min/iron/folic/vitK (ONE-A-DAY WOMEN'S COMPLETE ORAL) Take by mouth.      pilocarpine (SALAGEN) 5 MG Tab Take 2 tablets (10 mg total) by mouth 3 (three) times daily. 120 tablet 11    saliva stimulant comb. no.3 (BIOTENE) Spry by Mucous Membrane route as needed.       SALIVA SUBSTITUTION COMBO NO.9 (BIOTENE DRY MOUTH ORAL RINSE MM) by Mucous Membrane route as needed.       acetaminophen (TYLENOL ARTHRITIS ORAL) Take by mouth.      albuterol (PROVENTIL/VENTOLIN HFA) 90 mcg/actuation inhaler Inhale 2 puffs into the lungs every 4 (four) hours as needed for Wheezing or Shortness of Breath. 18 g 3    ascorbic acid/multivit-min (EMERGEN-C ORAL) Take by mouth.      CARBOXYMETHYLCELLULOSE SODIUM (REFRESH TEARS OPHT) Apply 2 drops to eye as needed.       LINZESS 145 mcg Cap capsule Take 1 capsule (145 mcg total) by mouth once daily. 90 capsule 3     "omeprazole (PRILOSEC) 40 MG capsule Take 1 capsule (40 mg total) by mouth every morning. 90 capsule 3    ondansetron (ZOFRAN) 4 MG tablet Take 1 tablet (4 mg total) by mouth every 6 (six) hours as needed for Nausea. (Patient not taking: Reported on 5/2/2023) 12 tablet 0    triamcinolone acetonide 0.1% (KENALOG) 0.1 % ointment Apply topically 2 (two) times daily. (Patient not taking: Reported on 5/2/2023) 80 g 3     No current facility-administered medications for this visit.     Allergies: Sulfa dyne, Peanut, and Sulfa (sulfonamide antibiotics)   Patient's last menstrual period was 03/03/2023.    ROS:  Feeling well but very fatigued.. No dyspnea or chest pain on exertion.  No abdominal pain, change in bowel habits, black or bloody stools.  No urinary tract symptoms. GYN ROS: no breast pain or new or enlarging lumps on self exam. No neurological complaints.    OBJECTIVE:   The patient appears well, alert, oriented x 3, in no distress.  /76   Pulse 72   Temp 97.8 °F (36.6 °C) (Oral)   Ht 5' 3" (1.6 m)   Wt 88 kg (194 lb 0.1 oz)   LMP 03/03/2023   SpO2 99%   BMI 34.37 kg/m²   Wt Readings from Last 5 Encounters:   05/02/23 88 kg (194 lb 0.1 oz)   11/23/22 88.5 kg (195 lb)   11/22/22 88.9 kg (195 lb 15.8 oz)   11/09/22 86.6 kg (191 lb)   09/26/22 88.2 kg (194 lb 7.1 oz)       ENT normal.  Neck supple. No adenopathy or thyromegaly. ARLENE. Lungs are clear, good air entry, no wheezes, rhonchi or rales. S1 and S2 normal, no murmurs, regular rate and rhythm. Abdomen soft without tenderness, guarding, mass or organomegaly. Extremities show no edema, normal peripheral pulses. Neurological is normal, no focal findings.  Some obesity.    Lab Results   Component Value Date    TSH 1.060 11/19/2019     Lab Results   Component Value Date    WBC 4.62 12/05/2022    HGB 9.8 (L) 12/05/2022    HCT 31.9 (L) 12/05/2022    MCV 94 12/05/2022     12/05/2022       Chemistry        Component Value Date/Time     (L) " 12/05/2022 0739    K 3.8 12/05/2022 0739     12/05/2022 0739    CO2 24 12/05/2022 0739    BUN 11 12/05/2022 0739    CREATININE 0.7 12/05/2022 0739    GLU 91 12/05/2022 0739        Component Value Date/Time    CALCIUM 9.1 12/05/2022 0739    ALKPHOS 31 (L) 12/05/2022 0739    AST 21 12/05/2022 0739    ALT 17 12/05/2022 0739    BILITOT 0.4 12/05/2022 0739    ESTGFRAFRICA >60.0 05/25/2022 0957    EGFRNONAA >60.0 05/25/2022 0957        Lab Results   Component Value Date    CHOL 90 (L) 11/19/2019    CHOL 116 (L) 11/22/2017    CHOL 124 06/10/2015     Lab Results   Component Value Date    HDL 38 (L) 11/19/2019    HDL 46 11/22/2017    HDL 42 06/10/2015     Lab Results   Component Value Date    LDLCALC 39.4 (L) 11/19/2019    LDLCALC 56.4 (L) 11/22/2017    LDLCALC 65.8 06/10/2015     Lab Results   Component Value Date    TRIG 63 11/19/2019    TRIG 68 11/22/2017    TRIG 81 06/10/2015     Lab Results   Component Value Date    CHOLHDL 42.2 11/19/2019    CHOLHDL 39.7 11/22/2017    CHOLHDL 33.9 06/10/2015         BREAST EXAM: deferred    PELVIC EXAM: deferred    ASSESSMENT:   1. Annual physical exam    2. Long-term use of Plaquenil    3. Sjogren's syndrome, with unspecified organ involvement    4. Chronic constipation    5. Gastroesophageal reflux disease without esophagitis          PLAN:   Counseled on age appropriate medical preventative services, including age appropriate cancer screenings, over all nutritional health, need for a consistent exercise regimen and an over all push towards maintaining a vigorous and active lifestyle.  Counseled on age appropriate vaccines and discussed upcoming health care needs based on age/gender.  Spent time with patient counseling on need for a good patient/doctor relationship moving forward.  Discussed use of common OTC medications and supplements.  Discussed common dietary aids and use of caffeine and the need for good sleep hygiene and stress management.    Problem List Items Addressed  This Visit       Sjogren's disease    Relevant Orders    D-Dimer, Quantitative    Sedimentation rate    GERD (gastroesophageal reflux disease)    Relevant Medications    omeprazole (PRILOSEC) 40 MG capsule    Long-term use of Plaquenil    Relevant Orders    D-Dimer, Quantitative    Sedimentation rate     Other Visit Diagnoses       Annual physical exam    -  Primary    Relevant Orders    CBC Auto Differential    Comprehensive Metabolic Panel    Hemoglobin A1C    Hepatitis C Antibody    Lipid Panel    Urinalysis    Uric Acid    TSH    RPR    D-Dimer, Quantitative    Sedimentation rate    Chronic constipation        Relevant Medications    LINZESS 145 mcg Cap capsule          Work on the anemia  See how to improve  No sickle cell trait.  Look for post covid micro clots on blood work.    Work on her sleep as well  Consider provigil during the day  Given her serious medical conditions will need FMLA  Intermittent to miss 3-5 days per month depending on flares.    Get her back on linzess    Gave her sample of linzess NDC 0895-2523-44  F/u in 1 year for wellness

## 2023-05-05 ENCOUNTER — PATIENT MESSAGE (OUTPATIENT)
Dept: FAMILY MEDICINE | Facility: CLINIC | Age: 48
End: 2023-05-05
Payer: COMMERCIAL

## 2023-05-05 ENCOUNTER — TELEPHONE (OUTPATIENT)
Dept: FAMILY MEDICINE | Facility: CLINIC | Age: 48
End: 2023-05-05
Payer: COMMERCIAL

## 2023-05-15 ENCOUNTER — PATIENT MESSAGE (OUTPATIENT)
Dept: FAMILY MEDICINE | Facility: CLINIC | Age: 48
End: 2023-05-15
Payer: COMMERCIAL

## 2023-06-07 ENCOUNTER — OFFICE VISIT (OUTPATIENT)
Dept: OPTOMETRY | Facility: CLINIC | Age: 48
End: 2023-06-07
Payer: COMMERCIAL

## 2023-06-07 DIAGNOSIS — M35.00 SJOGREN'S SYNDROME, WITH UNSPECIFIED ORGAN INVOLVEMENT: Primary | ICD-10-CM

## 2023-06-07 DIAGNOSIS — H04.123 DRY EYE SYNDROME, BILATERAL: ICD-10-CM

## 2023-06-07 DIAGNOSIS — H26.9 CORTICAL CATARACT OF LEFT EYE: ICD-10-CM

## 2023-06-07 DIAGNOSIS — Z79.899 LONG-TERM USE OF PLAQUENIL: ICD-10-CM

## 2023-06-07 PROCEDURE — 1159F PR MEDICATION LIST DOCUMENTED IN MEDICAL RECORD: ICD-10-PCS | Mod: CPTII,S$GLB,, | Performed by: OPTOMETRIST

## 2023-06-07 PROCEDURE — 92012 INTRM OPH EXAM EST PATIENT: CPT | Mod: S$GLB,,, | Performed by: OPTOMETRIST

## 2023-06-07 PROCEDURE — 99999 PR PBB SHADOW E&M-EST. PATIENT-LVL IV: CPT | Mod: PBBFAC,,, | Performed by: OPTOMETRIST

## 2023-06-07 PROCEDURE — 1159F MED LIST DOCD IN RCRD: CPT | Mod: CPTII,S$GLB,, | Performed by: OPTOMETRIST

## 2023-06-07 PROCEDURE — 3044F HG A1C LEVEL LT 7.0%: CPT | Mod: CPTII,S$GLB,, | Performed by: OPTOMETRIST

## 2023-06-07 PROCEDURE — 99999 PR PBB SHADOW E&M-EST. PATIENT-LVL IV: ICD-10-PCS | Mod: PBBFAC,,, | Performed by: OPTOMETRIST

## 2023-06-07 PROCEDURE — 3044F PR MOST RECENT HEMOGLOBIN A1C LEVEL <7.0%: ICD-10-PCS | Mod: CPTII,S$GLB,, | Performed by: OPTOMETRIST

## 2023-06-07 PROCEDURE — 92012 PR EYE EXAM, EST PATIENT,INTERMED: ICD-10-PCS | Mod: S$GLB,,, | Performed by: OPTOMETRIST

## 2023-06-07 NOTE — PATIENT INSTRUCTIONS
History of Sjogren's Syndrome, with secondary dry eye bilaterally.  Has been using Restasis Ophthalmic Emulsion in both eyes two times per day, and using ocular lubricating ointment at bedtime.  Has not been using artificial tears during the day, because of concerns about some aritificial tears which were recently recalled because of contamination.    Slit lamp examination reveals significant compromise of the corneal epithelial surface in both eyes, more apparent in the left eye.     Continue Restasis bid OU.  Continue lubricating ointment at bedtime OU.  Add Systane Complete ATs for use as often as desired in both eyes - sample of presvative-free Systane Complete issued.    No change made to spectacle lens Rx.     Currently taking Plaquenil 400 mg per day.  Overdue for Richter Visual Field (HVF) test and OTC of retina at macula.  Assistant to call to schedule HVF 10-2 Plaquenil visual field, and OCT of retinal at the macula, and follow-up visit with me (Dr. Wheat) on the same date.  .

## 2023-06-08 ENCOUNTER — TELEPHONE (OUTPATIENT)
Dept: OPHTHALMOLOGY | Facility: CLINIC | Age: 48
End: 2023-06-08
Payer: COMMERCIAL

## 2023-06-15 ENCOUNTER — TELEPHONE (OUTPATIENT)
Dept: OPHTHALMOLOGY | Facility: CLINIC | Age: 48
End: 2023-06-15
Payer: COMMERCIAL

## 2023-06-15 NOTE — TELEPHONE ENCOUNTER
"[facility] 10-2 plaquenil and f/u lvm    ----- Message from Daren Bravo sent at 6/15/2023  8:46 AM CDT -----  Consult/Advisory:          Name Of Caller: Self      Contact Preference?: [phone]       What is the nature of the call?: Returning call to office.          Additional Notes:  "Thank you for all that you do for our patients"       "
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
## 2023-06-26 DIAGNOSIS — K11.1 HYPERTROPHY OF SALIVARY GLAND: ICD-10-CM

## 2023-06-26 RX ORDER — HYDROXYCHLOROQUINE SULFATE 200 MG/1
TABLET, FILM COATED ORAL
Qty: 60 TABLET | Refills: 8 | Status: SHIPPED | OUTPATIENT
Start: 2023-06-26 | End: 2024-01-02 | Stop reason: SDUPTHER

## 2023-07-03 RX ORDER — PILOCARPINE HYDROCHLORIDE 7.5 MG/1
TABLET, FILM COATED ORAL
Qty: 120 TABLET | Refills: 2 | Status: SHIPPED | OUTPATIENT
Start: 2023-07-03 | End: 2023-10-07

## 2023-07-16 ENCOUNTER — HOSPITAL ENCOUNTER (EMERGENCY)
Facility: HOSPITAL | Age: 48
Discharge: HOME | End: 2023-07-16
Attending: EMERGENCY MEDICINE
Payer: COMMERCIAL

## 2023-07-16 VITALS
SYSTOLIC BLOOD PRESSURE: 133 MMHG | DIASTOLIC BLOOD PRESSURE: 73 MMHG | HEART RATE: 88 BPM | RESPIRATION RATE: 18 BRPM | OXYGEN SATURATION: 98 % | HEIGHT: 63 IN | WEIGHT: 200 LBS | BODY MASS INDEX: 35.44 KG/M2 | TEMPERATURE: 98 F

## 2023-07-16 DIAGNOSIS — R07.89 CHEST DISCOMFORT: Primary | ICD-10-CM

## 2023-07-16 LAB
ALBUMIN SERPL BCP-MCNC: 3.1 G/DL (ref 3.5–5.2)
ALP SERPL-CCNC: 29 U/L (ref 55–135)
ALT SERPL W/O P-5'-P-CCNC: 21 U/L (ref 10–44)
ANION GAP SERPL CALC-SCNC: 9 MMOL/L (ref 8–16)
AST SERPL-CCNC: 24 U/L (ref 10–40)
B-HCG UR QL: NEGATIVE
BASOPHILS # BLD AUTO: 0.02 K/UL (ref 0–0.2)
BASOPHILS NFR BLD: 0.5 % (ref 0–1.9)
BILIRUB SERPL-MCNC: 0.4 MG/DL (ref 0.1–1)
BUN SERPL-MCNC: 8 MG/DL (ref 6–20)
CALCIUM SERPL-MCNC: 9 MG/DL (ref 8.7–10.5)
CHLORIDE SERPL-SCNC: 109 MMOL/L (ref 95–110)
CO2 SERPL-SCNC: 22 MMOL/L (ref 23–29)
CREAT SERPL-MCNC: 0.7 MG/DL (ref 0.5–1.4)
CTP QC/QA: YES
DIFFERENTIAL METHOD: ABNORMAL
EOSINOPHIL # BLD AUTO: 0 K/UL (ref 0–0.5)
EOSINOPHIL NFR BLD: 0.7 % (ref 0–8)
ERYTHROCYTE [DISTWIDTH] IN BLOOD BY AUTOMATED COUNT: 13.5 % (ref 11.5–14.5)
EST. GFR  (NO RACE VARIABLE): >60 ML/MIN/1.73 M^2
GLUCOSE SERPL-MCNC: 76 MG/DL (ref 70–110)
HCT VFR BLD AUTO: 31.8 % (ref 37–48.5)
HGB BLD-MCNC: 9.9 G/DL (ref 12–16)
HIV 1+2 AB+HIV1 P24 AG SERPL QL IA: NORMAL
IMM GRANULOCYTES # BLD AUTO: 0.01 K/UL (ref 0–0.04)
IMM GRANULOCYTES NFR BLD AUTO: 0.2 % (ref 0–0.5)
LYMPHOCYTES # BLD AUTO: 1.5 K/UL (ref 1–4.8)
LYMPHOCYTES NFR BLD: 33.5 % (ref 18–48)
MCH RBC QN AUTO: 30.3 PG (ref 27–31)
MCHC RBC AUTO-ENTMCNC: 31.1 G/DL (ref 32–36)
MCV RBC AUTO: 97 FL (ref 82–98)
MONOCYTES # BLD AUTO: 0.3 K/UL (ref 0.3–1)
MONOCYTES NFR BLD: 7.5 % (ref 4–15)
NEUTROPHILS # BLD AUTO: 2.6 K/UL (ref 1.8–7.7)
NEUTROPHILS NFR BLD: 57.6 % (ref 38–73)
NRBC BLD-RTO: 0 /100 WBC
PLATELET # BLD AUTO: 164 K/UL (ref 150–450)
PMV BLD AUTO: 11.3 FL (ref 9.2–12.9)
POC CARDIAC TROPONIN I: 0 NG/ML (ref 0–0.08)
POTASSIUM SERPL-SCNC: 3.6 MMOL/L (ref 3.5–5.1)
PROT SERPL-MCNC: 9.9 G/DL (ref 6–8.4)
RBC # BLD AUTO: 3.27 M/UL (ref 4–5.4)
SAMPLE: NORMAL
SODIUM SERPL-SCNC: 140 MMOL/L (ref 136–145)
TROPONIN I SERPL DL<=0.01 NG/ML-MCNC: <0.006 NG/ML (ref 0–0.03)
TROPONIN I SERPL DL<=0.01 NG/ML-MCNC: <0.006 NG/ML (ref 0–0.03)
WBC # BLD AUTO: 4.42 K/UL (ref 3.9–12.7)

## 2023-07-16 PROCEDURE — 87389 HIV-1 AG W/HIV-1&-2 AB AG IA: CPT | Performed by: PHYSICIAN ASSISTANT

## 2023-07-16 PROCEDURE — 85025 COMPLETE CBC W/AUTO DIFF WBC: CPT | Performed by: EMERGENCY MEDICINE

## 2023-07-16 PROCEDURE — 80053 COMPREHEN METABOLIC PANEL: CPT | Performed by: EMERGENCY MEDICINE

## 2023-07-16 PROCEDURE — 84484 ASSAY OF TROPONIN QUANT: CPT

## 2023-07-16 PROCEDURE — 93010 EKG 12-LEAD: ICD-10-PCS | Mod: ,,, | Performed by: INTERNAL MEDICINE

## 2023-07-16 PROCEDURE — 93010 ELECTROCARDIOGRAM REPORT: CPT | Mod: ,,, | Performed by: INTERNAL MEDICINE

## 2023-07-16 PROCEDURE — 93005 ELECTROCARDIOGRAM TRACING: CPT

## 2023-07-16 PROCEDURE — 84484 ASSAY OF TROPONIN QUANT: CPT | Performed by: EMERGENCY MEDICINE

## 2023-07-16 PROCEDURE — 99285 EMERGENCY DEPT VISIT HI MDM: CPT | Mod: 25

## 2023-07-16 PROCEDURE — 81025 URINE PREGNANCY TEST: CPT | Performed by: EMERGENCY MEDICINE

## 2023-07-16 RX ORDER — HYDROXYZINE PAMOATE 25 MG/1
25 CAPSULE ORAL EVERY 6 HOURS PRN
Qty: 25 CAPSULE | Refills: 0 | Status: ON HOLD | OUTPATIENT
Start: 2023-07-16 | End: 2024-03-21
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
## 2023-07-16 NOTE — ED NOTES
Two patient identifiers have been checked and are correct.      Appearance: Pt awake, alert & oriented to person, place & time. Pt in no acute distress at present time. Pt is clean and well groomed with clothes appropriately fastened.   Skin: Skin warm, dry & intact. Color consistent with ethnicity. Mucous membranes moist. No breakdown or brusing noted.   Musculoskeletal: Patient moving all extremities well, no obvious swelling or deformities noted.   Respiratory: Respirations spontaneous, even, and non-labored. Visible chest rise noted. Airway is open and patent. No accessory muscle use noted.   Neurologic: Sensation is intact. Speech is clear and appropriate. Eyes open spontaneously, behavior appropriate to situation, follows commands, facial expression symmetrical, bilateral hand grasp equal and even, purposeful motor response noted.  Cardiac: All peripheral pulses present. No Bilateral lower extremity edema. Cap refill is <3 seconds.  Abdomen: Abdomen soft, non-tender to palpation.   : Pt reports no dysuria or hematuria.

## 2023-07-31 ENCOUNTER — LAB VISIT (OUTPATIENT)
Dept: LAB | Facility: HOSPITAL | Age: 48
End: 2023-07-31
Attending: FAMILY MEDICINE
Payer: COMMERCIAL

## 2023-07-31 ENCOUNTER — PATIENT MESSAGE (OUTPATIENT)
Dept: FAMILY MEDICINE | Facility: CLINIC | Age: 48
End: 2023-07-31

## 2023-07-31 ENCOUNTER — OFFICE VISIT (OUTPATIENT)
Dept: FAMILY MEDICINE | Facility: CLINIC | Age: 48
End: 2023-07-31
Payer: COMMERCIAL

## 2023-07-31 VITALS
OXYGEN SATURATION: 99 % | BODY MASS INDEX: 33.43 KG/M2 | WEIGHT: 188.69 LBS | DIASTOLIC BLOOD PRESSURE: 64 MMHG | TEMPERATURE: 98 F | HEIGHT: 63 IN | SYSTOLIC BLOOD PRESSURE: 116 MMHG | HEART RATE: 87 BPM

## 2023-07-31 DIAGNOSIS — D63.8 ANEMIA OF CHRONIC DISEASE: ICD-10-CM

## 2023-07-31 DIAGNOSIS — J45.30 MILD PERSISTENT ASTHMA WITHOUT COMPLICATION: ICD-10-CM

## 2023-07-31 DIAGNOSIS — M35.00 SJOGREN'S SYNDROME, WITH UNSPECIFIED ORGAN INVOLVEMENT: ICD-10-CM

## 2023-07-31 DIAGNOSIS — M35.00 SJOGREN'S SYNDROME, WITH UNSPECIFIED ORGAN INVOLVEMENT: Primary | ICD-10-CM

## 2023-07-31 LAB — ERYTHROCYTE [SEDIMENTATION RATE] IN BLOOD BY WESTERGREN METHOD: >140 MM/HR (ref 0–20)

## 2023-07-31 PROCEDURE — 99214 PR OFFICE/OUTPT VISIT, EST, LEVL IV, 30-39 MIN: ICD-10-PCS | Mod: S$GLB,,, | Performed by: FAMILY MEDICINE

## 2023-07-31 PROCEDURE — 3078F DIAST BP <80 MM HG: CPT | Mod: CPTII,S$GLB,, | Performed by: FAMILY MEDICINE

## 2023-07-31 PROCEDURE — 85652 RBC SED RATE AUTOMATED: CPT | Performed by: FAMILY MEDICINE

## 2023-07-31 PROCEDURE — 3044F HG A1C LEVEL LT 7.0%: CPT | Mod: CPTII,S$GLB,, | Performed by: FAMILY MEDICINE

## 2023-07-31 PROCEDURE — 1159F PR MEDICATION LIST DOCUMENTED IN MEDICAL RECORD: ICD-10-PCS | Mod: CPTII,S$GLB,, | Performed by: FAMILY MEDICINE

## 2023-07-31 PROCEDURE — 99999 PR PBB SHADOW E&M-EST. PATIENT-LVL IV: CPT | Mod: PBBFAC,,, | Performed by: FAMILY MEDICINE

## 2023-07-31 PROCEDURE — 36415 COLL VENOUS BLD VENIPUNCTURE: CPT | Performed by: FAMILY MEDICINE

## 2023-07-31 PROCEDURE — 3078F PR MOST RECENT DIASTOLIC BLOOD PRESSURE < 80 MM HG: ICD-10-PCS | Mod: CPTII,S$GLB,, | Performed by: FAMILY MEDICINE

## 2023-07-31 PROCEDURE — 3008F PR BODY MASS INDEX (BMI) DOCUMENTED: ICD-10-PCS | Mod: CPTII,S$GLB,, | Performed by: FAMILY MEDICINE

## 2023-07-31 PROCEDURE — 99999 PR PBB SHADOW E&M-EST. PATIENT-LVL IV: ICD-10-PCS | Mod: PBBFAC,,, | Performed by: FAMILY MEDICINE

## 2023-07-31 PROCEDURE — 3074F PR MOST RECENT SYSTOLIC BLOOD PRESSURE < 130 MM HG: ICD-10-PCS | Mod: CPTII,S$GLB,, | Performed by: FAMILY MEDICINE

## 2023-07-31 PROCEDURE — 3044F PR MOST RECENT HEMOGLOBIN A1C LEVEL <7.0%: ICD-10-PCS | Mod: CPTII,S$GLB,, | Performed by: FAMILY MEDICINE

## 2023-07-31 PROCEDURE — 3074F SYST BP LT 130 MM HG: CPT | Mod: CPTII,S$GLB,, | Performed by: FAMILY MEDICINE

## 2023-07-31 PROCEDURE — 3008F BODY MASS INDEX DOCD: CPT | Mod: CPTII,S$GLB,, | Performed by: FAMILY MEDICINE

## 2023-07-31 PROCEDURE — 1159F MED LIST DOCD IN RCRD: CPT | Mod: CPTII,S$GLB,, | Performed by: FAMILY MEDICINE

## 2023-07-31 PROCEDURE — 99214 OFFICE O/P EST MOD 30 MIN: CPT | Mod: S$GLB,,, | Performed by: FAMILY MEDICINE

## 2023-07-31 RX ORDER — PREDNISONE 20 MG/1
20 TABLET ORAL DAILY
Qty: 10 TABLET | Refills: 0 | Status: SHIPPED | OUTPATIENT
Start: 2023-07-31 | End: 2023-10-13 | Stop reason: ALTCHOICE

## 2023-07-31 NOTE — PROGRESS NOTES
"HISTORY OF PRESENT ILLNESS:  Manasa Hollins is a 47 y.o. female who presents to the clinic today for Follow-up (From ER )  .       ER visit and she is feeling better  Was under tremendous stress  She is still struggling with fatigue.      Patient Active Problem List   Diagnosis    AR (allergic rhinitis)    Deviated nasal septum    Sjogren's disease    Anemia of chronic disease    GERD (gastroesophageal reflux disease)    Mild persistent asthma without complication    Recurrent sinusitis    Anti-polysaccharide antibody deficiency    Dry eye syndrome, bilateral    Long-term use of Plaquenil           CARE TEAM:  Patient Care Team:  Jh Kendall MD as PCP - General (Family Medicine)         ROS        PHYSICAL EXAM:   /64   Pulse 87   Temp 98.3 °F (36.8 °C) (Oral)   Ht 5' 3" (1.6 m)   Wt 85.6 kg (188 lb 11.4 oz)   SpO2 99%   BMI 33.43 kg/m²   BP Readings from Last 5 Encounters:   07/31/23 116/64   07/16/23 133/73   05/02/23 126/76   11/23/22 114/72   11/22/22 111/72     Wt Readings from Last 5 Encounters:   07/31/23 85.6 kg (188 lb 11.4 oz)   07/16/23 90.7 kg (200 lb)   05/02/23 88 kg (194 lb 0.1 oz)   11/23/22 88.5 kg (195 lb)   11/22/22 88.9 kg (195 lb 15.8 oz)             She is still down  And she is not feeling well overall  But she is pushing throught       Lab Results   Component Value Date    TSH 0.925 05/02/2023     Lab Results   Component Value Date    WBC 4.42 07/16/2023    HGB 9.9 (L) 07/16/2023    HCT 31.8 (L) 07/16/2023    MCV 97 07/16/2023     07/16/2023       Chemistry        Component Value Date/Time     07/16/2023 1449    K 3.6 07/16/2023 1449     07/16/2023 1449    CO2 22 (L) 07/16/2023 1449    BUN 8 07/16/2023 1449    CREATININE 0.7 07/16/2023 1449    GLU 76 07/16/2023 1449        Component Value Date/Time    CALCIUM 9.0 07/16/2023 1449    ALKPHOS 29 (L) 07/16/2023 1449    AST 24 07/16/2023 1449    ALT 21 07/16/2023 1449    BILITOT 0.4 07/16/2023 1449    " ESTGFRAFRICA >60.0 05/25/2022 0957    EGFRNONAA >60.0 05/25/2022 0957        Lab Results   Component Value Date    CHOL 104 (L) 05/02/2023    CHOL 90 (L) 11/19/2019    CHOL 116 (L) 11/22/2017     Lab Results   Component Value Date    HDL 44 05/02/2023    HDL 38 (L) 11/19/2019    HDL 46 11/22/2017     Lab Results   Component Value Date    LDLCALC 45.4 (L) 05/02/2023    LDLCALC 39.4 (L) 11/19/2019    LDLCALC 56.4 (L) 11/22/2017     Lab Results   Component Value Date    TRIG 73 05/02/2023    TRIG 63 11/19/2019    TRIG 68 11/22/2017     Lab Results   Component Value Date    CHOLHDL 42.3 05/02/2023    CHOLHDL 42.2 11/19/2019    CHOLHDL 39.7 11/22/2017       Medication List with Changes/Refills   Current Medications    ACETAMINOPHEN (TYLENOL ARTHRITIS ORAL)    Take by mouth.    ALBUTEROL (PROVENTIL/VENTOLIN HFA) 90 MCG/ACTUATION INHALER    Inhale 2 puffs into the lungs every 4 (four) hours as needed for Wheezing or Shortness of Breath.    ALBUTEROL (PROVENTIL/VENTOLIN HFA) 90 MCG/ACTUATION INHALER    Inhale 2 puffs into the lungs every 4 (four) hours as needed for Wheezing or Shortness of Breath.    ASCORBIC ACID/MULTIVIT-MIN (EMERGEN-C ORAL)    Take by mouth.    AZELASTINE (ASTELIN) 137 MCG (0.1 %) NASAL SPRAY    SPRAY 2 SPRAYS IN EACH NOSTRIL TWICE A DAY.    CARBOXYMETHYLCELLULOSE SODIUM (REFRESH TEARS OPHT)    Apply 2 drops to eye as needed.     CYCLOSPORINE (RESTASIS) 0.05 % OPHTHALMIC EMULSION    Place 1 drop into both eyes 2 (two) times daily.    FLUORIDE, SODIUM, (PREVIDENT) 0.2 % SOLN    USE AS DIRECTED    FLUTICASONE PROPIONATE (FLONASE) 50 MCG/ACTUATION NASAL SPRAY    SPRAY 2 SPRAYS IN EACH NOSTRIL TWICE A DAY    HYDROXYCHLOROQUINE (PLAQUENIL) 200 MG TABLET    TAKE 2 TABLETS BY MOUTH EVERY DAY    HYDROXYZINE PAMOATE (VISTARIL) 25 MG CAP    Take 1 capsule (25 mg total) by mouth every 6 (six) hours as needed (Anxiety).    IMMUN GLOB G,IGG,-PRO-IGA 0-50 (HIZENTRA) 4 GRAM/20 ML (20 %) SOLN    Inject 60 mLs (12 g  total) into the skin every 7 days.    LIDOCAINE-PRILOCAINE (EMLA) CREAM    Apply topically as needed.    LINZESS 145 MCG CAP CAPSULE    Take 1 capsule (145 mcg total) by mouth once daily.    MV,CALCIUM,MIN/IRON/FOLIC/VITK (ONE-A-DAY WOMEN'S COMPLETE ORAL)    Take by mouth.    OMEPRAZOLE (PRILOSEC) 40 MG CAPSULE    Take 1 capsule (40 mg total) by mouth every morning.    PILOCARPINE HCL (SALAGEN) 7.5 MG TABLET    TAKE 1 TABLET (7.5 MG TOTAL) BY MOUTH 4 (FOUR) TIMES DAILY.    SALIVA STIMULANT COMB. NO.3 (BIOTENE) SPRY    by Mucous Membrane route as needed.     SALIVA SUBSTITUTION COMBO NO.9 (BIOTENE DRY MOUTH ORAL RINSE MM)    by Mucous Membrane route as needed.     TRIAMCINOLONE ACETONIDE 0.1% (KENALOG) 0.1 % OINTMENT    Apply topically 2 (two) times daily.   Discontinued Medications    ONDANSETRON (ZOFRAN) 4 MG TABLET    Take 1 tablet (4 mg total) by mouth every 6 (six) hours as needed for Nausea.    PILOCARPINE (SALAGEN) 5 MG TAB    Take 2 tablets (10 mg total) by mouth 3 (three) times daily.         ASSESSMENT AND PLAN:    Problem List Items Addressed This Visit       Sjogren's disease - Primary    Relevant Orders    Sedimentation rate    Anemia of chronic disease    Relevant Orders    Sedimentation rate    Mild persistent asthma without complication    Relevant Orders    Sedimentation rate       Try to avoid stress  Recheck esr  And find out from that.    Future Appointments   Date Time Provider Department Center   7/31/2023  2:20 PM Jh Kendall MD [facility]   10/16/2023  2:30 PM LEGER, VISUAL FIELDS [facility] OPHTHAL [facility]   10/16/2023  3:00 PM Efrain Wheat OD [facility] OPTOMTY [facility] shady       No follow-ups on file. or sooner as needed.

## 2023-09-27 DIAGNOSIS — D80.3 IGG2 SUBCLASS DEFICIENCY: ICD-10-CM

## 2023-09-27 DIAGNOSIS — D80.6 ANTI-POLYSACCHARIDE ANTIBODY DEFICIENCY: ICD-10-CM

## 2023-09-27 RX ORDER — HUMAN IMMUNOGLOBULIN G 0.2 G/ML
12 LIQUID SUBCUTANEOUS
Qty: 240 ML | Refills: 11 | Status: SHIPPED | OUTPATIENT
Start: 2023-09-27 | End: 2024-01-02

## 2023-10-07 RX ORDER — PILOCARPINE HYDROCHLORIDE 7.5 MG/1
TABLET, FILM COATED ORAL
Qty: 360 TABLET | Refills: 0 | Status: SHIPPED | OUTPATIENT
Start: 2023-10-07 | End: 2023-11-09 | Stop reason: SDUPTHER

## 2023-10-09 RX ORDER — LIDOCAINE AND PRILOCAINE 25; 25 MG/G; MG/G
CREAM TOPICAL DAILY PRN
Qty: 30 G | Refills: 4 | Status: ON HOLD | OUTPATIENT
Start: 2023-10-09 | End: 2024-03-21

## 2023-10-11 ENCOUNTER — HOSPITAL ENCOUNTER (EMERGENCY)
Facility: HOSPITAL | Age: 48
Discharge: HOME | End: 2023-10-11
Attending: STUDENT IN AN ORGANIZED HEALTH CARE EDUCATION/TRAINING PROGRAM
Payer: COMMERCIAL

## 2023-10-11 VITALS
SYSTOLIC BLOOD PRESSURE: 109 MMHG | TEMPERATURE: 98 F | HEIGHT: 63 IN | HEART RATE: 67 BPM | RESPIRATION RATE: 16 BRPM | WEIGHT: 180 LBS | DIASTOLIC BLOOD PRESSURE: 62 MMHG | OXYGEN SATURATION: 99 % | BODY MASS INDEX: 31.89 KG/M2

## 2023-10-11 DIAGNOSIS — R19.7 DIARRHEA, UNSPECIFIED TYPE: ICD-10-CM

## 2023-10-11 DIAGNOSIS — R42 DIZZINESS: ICD-10-CM

## 2023-10-11 DIAGNOSIS — R11.0 NAUSEA: Primary | ICD-10-CM

## 2023-10-11 LAB
ALBUMIN SERPL BCP-MCNC: 3.3 G/DL (ref 3.5–5.2)
ALP SERPL-CCNC: 36 U/L (ref 55–135)
ALT SERPL W/O P-5'-P-CCNC: 22 U/L (ref 10–44)
ANION GAP SERPL CALC-SCNC: 4 MMOL/L (ref 8–16)
AST SERPL-CCNC: 24 U/L (ref 10–40)
B-HCG UR QL: NEGATIVE
BASOPHILS # BLD AUTO: 0.01 K/UL (ref 0–0.2)
BASOPHILS NFR BLD: 0.2 % (ref 0–1.9)
BILIRUB SERPL-MCNC: 0.4 MG/DL (ref 0.1–1)
BILIRUB UR QL STRIP: NEGATIVE
BUN SERPL-MCNC: 12 MG/DL (ref 6–20)
CALCIUM SERPL-MCNC: 9.1 MG/DL (ref 8.7–10.5)
CHLORIDE SERPL-SCNC: 106 MMOL/L (ref 95–110)
CLARITY UR REFRACT.AUTO: CLEAR
CO2 SERPL-SCNC: 24 MMOL/L (ref 23–29)
COLOR UR AUTO: YELLOW
CREAT SERPL-MCNC: 0.6 MG/DL (ref 0.5–1.4)
CTP QC/QA: YES
DIFFERENTIAL METHOD: ABNORMAL
EOSINOPHIL # BLD AUTO: 0 K/UL (ref 0–0.5)
EOSINOPHIL NFR BLD: 0.2 % (ref 0–8)
ERYTHROCYTE [DISTWIDTH] IN BLOOD BY AUTOMATED COUNT: 13.7 % (ref 11.5–14.5)
EST. GFR  (NO RACE VARIABLE): >60 ML/MIN/1.73 M^2
GLUCOSE SERPL-MCNC: 98 MG/DL (ref 70–110)
GLUCOSE UR QL STRIP: NEGATIVE
HCT VFR BLD AUTO: 32 % (ref 37–48.5)
HGB BLD-MCNC: 10.3 G/DL (ref 12–16)
HGB UR QL STRIP: NEGATIVE
IMM GRANULOCYTES # BLD AUTO: 0.02 K/UL (ref 0–0.04)
IMM GRANULOCYTES NFR BLD AUTO: 0.3 % (ref 0–0.5)
INFLUENZA A, MOLECULAR: NEGATIVE
INFLUENZA B, MOLECULAR: NEGATIVE
KETONES UR QL STRIP: NEGATIVE
LEUKOCYTE ESTERASE UR QL STRIP: NEGATIVE
LYMPHOCYTES # BLD AUTO: 0.6 K/UL (ref 1–4.8)
LYMPHOCYTES NFR BLD: 9 % (ref 18–48)
MAGNESIUM SERPL-MCNC: 1.7 MG/DL (ref 1.6–2.6)
MCH RBC QN AUTO: 30.1 PG (ref 27–31)
MCHC RBC AUTO-ENTMCNC: 32.2 G/DL (ref 32–36)
MCV RBC AUTO: 94 FL (ref 82–98)
MONOCYTES # BLD AUTO: 0.2 K/UL (ref 0.3–1)
MONOCYTES NFR BLD: 2.9 % (ref 4–15)
NEUTROPHILS # BLD AUTO: 5.8 K/UL (ref 1.8–7.7)
NEUTROPHILS NFR BLD: 87.4 % (ref 38–73)
NITRITE UR QL STRIP: NEGATIVE
NRBC BLD-RTO: 0 /100 WBC
PH UR STRIP: 8 [PH] (ref 5–8)
PHOSPHATE SERPL-MCNC: 3.3 MG/DL (ref 2.7–4.5)
PLATELET # BLD AUTO: 195 K/UL (ref 150–450)
PMV BLD AUTO: 11.7 FL (ref 9.2–12.9)
POTASSIUM SERPL-SCNC: 4.2 MMOL/L (ref 3.5–5.1)
PROT SERPL-MCNC: 10.2 G/DL (ref 6–8.4)
PROT UR QL STRIP: NEGATIVE
RBC # BLD AUTO: 3.42 M/UL (ref 4–5.4)
SARS-COV-2 RDRP RESP QL NAA+PROBE: NEGATIVE
SODIUM SERPL-SCNC: 134 MMOL/L (ref 136–145)
SP GR UR STRIP: 1.01 (ref 1–1.03)
SPECIMEN SOURCE: NORMAL
TROPONIN I SERPL DL<=0.01 NG/ML-MCNC: <0.006 NG/ML (ref 0–0.03)
URN SPEC COLLECT METH UR: NORMAL
WBC # BLD AUTO: 6.64 K/UL (ref 3.9–12.7)

## 2023-10-11 PROCEDURE — 93005 ELECTROCARDIOGRAM TRACING: CPT

## 2023-10-11 PROCEDURE — 93010 ELECTROCARDIOGRAM REPORT: CPT | Mod: ,,, | Performed by: INTERNAL MEDICINE

## 2023-10-11 PROCEDURE — 96361 HYDRATE IV INFUSION ADD-ON: CPT

## 2023-10-11 PROCEDURE — 25500020 PHARM REV CODE 255: Performed by: STUDENT IN AN ORGANIZED HEALTH CARE EDUCATION/TRAINING PROGRAM

## 2023-10-11 PROCEDURE — 63600175 PHARM REV CODE 636 W HCPCS: Performed by: STUDENT IN AN ORGANIZED HEALTH CARE EDUCATION/TRAINING PROGRAM

## 2023-10-11 PROCEDURE — 81003 URINALYSIS AUTO W/O SCOPE: CPT | Performed by: STUDENT IN AN ORGANIZED HEALTH CARE EDUCATION/TRAINING PROGRAM

## 2023-10-11 PROCEDURE — 93010 EKG 12-LEAD: ICD-10-PCS | Mod: ,,, | Performed by: INTERNAL MEDICINE

## 2023-10-11 PROCEDURE — 25000003 PHARM REV CODE 250: Performed by: STUDENT IN AN ORGANIZED HEALTH CARE EDUCATION/TRAINING PROGRAM

## 2023-10-11 PROCEDURE — 80053 COMPREHEN METABOLIC PANEL: CPT | Performed by: STUDENT IN AN ORGANIZED HEALTH CARE EDUCATION/TRAINING PROGRAM

## 2023-10-11 PROCEDURE — 84100 ASSAY OF PHOSPHORUS: CPT | Performed by: STUDENT IN AN ORGANIZED HEALTH CARE EDUCATION/TRAINING PROGRAM

## 2023-10-11 PROCEDURE — 84484 ASSAY OF TROPONIN QUANT: CPT | Performed by: STUDENT IN AN ORGANIZED HEALTH CARE EDUCATION/TRAINING PROGRAM

## 2023-10-11 PROCEDURE — 96374 THER/PROPH/DIAG INJ IV PUSH: CPT | Mod: 59

## 2023-10-11 PROCEDURE — 85025 COMPLETE CBC W/AUTO DIFF WBC: CPT | Performed by: STUDENT IN AN ORGANIZED HEALTH CARE EDUCATION/TRAINING PROGRAM

## 2023-10-11 PROCEDURE — 83735 ASSAY OF MAGNESIUM: CPT | Performed by: STUDENT IN AN ORGANIZED HEALTH CARE EDUCATION/TRAINING PROGRAM

## 2023-10-11 PROCEDURE — U0002 COVID-19 LAB TEST NON-CDC: HCPCS | Performed by: STUDENT IN AN ORGANIZED HEALTH CARE EDUCATION/TRAINING PROGRAM

## 2023-10-11 PROCEDURE — 81025 URINE PREGNANCY TEST: CPT | Performed by: STUDENT IN AN ORGANIZED HEALTH CARE EDUCATION/TRAINING PROGRAM

## 2023-10-11 PROCEDURE — 99285 EMERGENCY DEPT VISIT HI MDM: CPT | Mod: 25

## 2023-10-11 PROCEDURE — 87502 INFLUENZA DNA AMP PROBE: CPT | Performed by: STUDENT IN AN ORGANIZED HEALTH CARE EDUCATION/TRAINING PROGRAM

## 2023-10-11 RX ORDER — ONDANSETRON 2 MG/ML
4 INJECTION INTRAMUSCULAR; INTRAVENOUS
Status: COMPLETED | OUTPATIENT
Start: 2023-10-11 | End: 2023-10-11

## 2023-10-11 RX ORDER — MECLIZINE HCL 12.5 MG 12.5 MG/1
25 TABLET ORAL
Status: COMPLETED | OUTPATIENT
Start: 2023-10-11 | End: 2023-10-11

## 2023-10-11 RX ORDER — ONDANSETRON 4 MG/1
4 TABLET, ORALLY DISINTEGRATING ORAL 2 TIMES DAILY
Qty: 10 TABLET | Refills: 0 | Status: SHIPPED | OUTPATIENT
Start: 2023-10-11 | End: 2023-10-16

## 2023-10-11 RX ADMIN — MECLIZINE HYDROCHLORIDE 25 MG: 12.5 TABLET ORAL at 11:10

## 2023-10-11 RX ADMIN — ONDANSETRON 4 MG: 2 INJECTION INTRAMUSCULAR; INTRAVENOUS at 11:10

## 2023-10-11 RX ADMIN — IOHEXOL 100 ML: 350 INJECTION, SOLUTION INTRAVENOUS at 01:10

## 2023-10-11 NOTE — ED TRIAGE NOTES
"Lightheaded dizzy and vomiting since 4am. Called pcp and was advised to come to ER. Abd pain 'from gagging". Denies chest pain. Denies sob.   "

## 2023-10-11 NOTE — Clinical Note
James Hollins accompanied their child to the emergency department on 10/11/2023. They may return to work on 10/12/2023.      If you have any questions or concerns, please don't hesitate to call.      DR Breaux/ BRANDON Sequeira

## 2023-10-11 NOTE — Clinical Note
"Manasa Oliverille" Gurvinder was seen and treated in our emergency department on 10/11/2023.  She may return to work on 10/12/2023.       If you have any questions or concerns, please don't hesitate to call.      DR Breaux/BRANDON Jara    "

## 2023-10-11 NOTE — Clinical Note
James Hollins accompanied their mother to the emergency department on 10/11/2023. They may return to school on 10/12/2023.      If you have any questions or concerns, please don't hesitate to call.      DR Breaux/ Marek, RN RN
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
## 2023-10-11 NOTE — DISCHARGE INSTRUCTIONS
You were seen in the emergency department today for diarrhea, and dizziness.  Your workup in the emergency department did not show any evidence of urinary infection, electrolyte abnormality, renal dysfunction, or intracranial abnormality.  You do have chronic anemia that is improved from prior labs.  It is likely that your symptoms are due to some viral illness that caused your diarrhea but you will need to follow up outpatient with the primary care doctor within 1 week for re-evaluation.    Please return to the emergency department if you experience any new or concerning symptoms including worsening dizziness, loss of consciousness, seizures, weakness, numbness, chest pain, shortness of breath, inability to tolerate food/liquids, bloody vomiting, or bloody bowel movements.

## 2023-10-11 NOTE — Clinical Note
"Manasa Nicholson" Gurvinder was seen and treated in our emergency department on 10/11/2023.  She may return to school on 10/12/2023.      If you have any questions or concerns, please don't hesitate to call.       BRANDON"

## 2023-10-12 ENCOUNTER — TELEPHONE (OUTPATIENT)
Dept: FAMILY MEDICINE | Facility: CLINIC | Age: 48
End: 2023-10-12
Payer: COMMERCIAL

## 2023-10-12 NOTE — TELEPHONE ENCOUNTER
----- Message from Garrett Roque sent at 10/12/2023 12:49 PM CDT -----  Type:  Sooner Appointment Request    Patient is requesting a sooner appointment.  Patient declined first available appointment listed as well as another facility and provider .  Patient will not accept being placed on the waitlist and is requesting a message be sent to doctor.    Name of Caller: Self     When is the first available appointment? 12/22    Symptoms: ER F/U nauseous     Would the patient rather a call back or a response via [facility]? Call     Best Call Back Number: [phone] (home)

## 2023-10-12 NOTE — TELEPHONE ENCOUNTER
Call returned. Patient scheduled for hospital follow up with Daniella Brown NP on 10/13/23 at 11:30 am.

## 2023-10-13 ENCOUNTER — OFFICE VISIT (OUTPATIENT)
Dept: FAMILY MEDICINE | Facility: CLINIC | Age: 48
End: 2023-10-13
Payer: COMMERCIAL

## 2023-10-13 VITALS
SYSTOLIC BLOOD PRESSURE: 110 MMHG | TEMPERATURE: 98 F | DIASTOLIC BLOOD PRESSURE: 64 MMHG | BODY MASS INDEX: 33.94 KG/M2 | HEIGHT: 63 IN | OXYGEN SATURATION: 98 % | RESPIRATION RATE: 16 BRPM | WEIGHT: 191.56 LBS | HEART RATE: 78 BPM

## 2023-10-13 DIAGNOSIS — D80.6 ANTI-POLYSACCHARIDE ANTIBODY DEFICIENCY: ICD-10-CM

## 2023-10-13 DIAGNOSIS — R42 VERTIGO: Primary | ICD-10-CM

## 2023-10-13 PROCEDURE — 99214 OFFICE O/P EST MOD 30 MIN: CPT | Mod: S$GLB,,, | Performed by: NURSE PRACTITIONER

## 2023-10-13 PROCEDURE — 3044F PR MOST RECENT HEMOGLOBIN A1C LEVEL <7.0%: ICD-10-PCS | Mod: CPTII,S$GLB,, | Performed by: NURSE PRACTITIONER

## 2023-10-13 PROCEDURE — 3008F BODY MASS INDEX DOCD: CPT | Mod: CPTII,S$GLB,, | Performed by: NURSE PRACTITIONER

## 2023-10-13 PROCEDURE — 99999 PR PBB SHADOW E&M-EST. PATIENT-LVL V: CPT | Mod: PBBFAC,,, | Performed by: NURSE PRACTITIONER

## 2023-10-13 PROCEDURE — 3074F SYST BP LT 130 MM HG: CPT | Mod: CPTII,S$GLB,, | Performed by: NURSE PRACTITIONER

## 2023-10-13 PROCEDURE — 99214 PR OFFICE/OUTPT VISIT, EST, LEVL IV, 30-39 MIN: ICD-10-PCS | Mod: S$GLB,,, | Performed by: NURSE PRACTITIONER

## 2023-10-13 PROCEDURE — 99999 PR PBB SHADOW E&M-EST. PATIENT-LVL V: ICD-10-PCS | Mod: PBBFAC,,, | Performed by: NURSE PRACTITIONER

## 2023-10-13 PROCEDURE — 3008F PR BODY MASS INDEX (BMI) DOCUMENTED: ICD-10-PCS | Mod: CPTII,S$GLB,, | Performed by: NURSE PRACTITIONER

## 2023-10-13 PROCEDURE — 3078F PR MOST RECENT DIASTOLIC BLOOD PRESSURE < 80 MM HG: ICD-10-PCS | Mod: CPTII,S$GLB,, | Performed by: NURSE PRACTITIONER

## 2023-10-13 PROCEDURE — 3078F DIAST BP <80 MM HG: CPT | Mod: CPTII,S$GLB,, | Performed by: NURSE PRACTITIONER

## 2023-10-13 PROCEDURE — 3044F HG A1C LEVEL LT 7.0%: CPT | Mod: CPTII,S$GLB,, | Performed by: NURSE PRACTITIONER

## 2023-10-13 PROCEDURE — 3074F PR MOST RECENT SYSTOLIC BLOOD PRESSURE < 130 MM HG: ICD-10-PCS | Mod: CPTII,S$GLB,, | Performed by: NURSE PRACTITIONER

## 2023-10-13 NOTE — LETTER
October 13, 2023    Manasa Hollins  [address]  [facility] [address]             [facility]  Family Medicine  [address]  [place] [address]  Phone: [phone]  Fax: [phone]   October 13, 2023     Patient: Manasa Hollins   YOB: 1975   Date of Visit: 10/13/2023       To Whom it May Concern:    Manasa Hollins was seen in my clinic on 10/13/2023. She may return to work on today, Friday, 10/13/2023 .    Please excuse her from any classes or work missed.    If you have any questions or concerns, please don't hesitate to call.    Sincerely,         Daniella Brown, NP

## 2023-10-16 ENCOUNTER — CLINICAL SUPPORT (OUTPATIENT)
Dept: OPHTHALMOLOGY | Facility: CLINIC | Age: 48
End: 2023-10-16
Payer: COMMERCIAL

## 2023-10-16 ENCOUNTER — OFFICE VISIT (OUTPATIENT)
Dept: OPTOMETRY | Facility: CLINIC | Age: 48
End: 2023-10-16
Payer: COMMERCIAL

## 2023-10-16 DIAGNOSIS — M35.00 SJOGREN'S SYNDROME, WITH UNSPECIFIED ORGAN INVOLVEMENT: ICD-10-CM

## 2023-10-16 DIAGNOSIS — Z79.899 LONG-TERM USE OF PLAQUENIL: ICD-10-CM

## 2023-10-16 DIAGNOSIS — H04.123 DRY EYE SYNDROME, BILATERAL: ICD-10-CM

## 2023-10-16 DIAGNOSIS — M35.00 SJOGREN'S SYNDROME, WITH UNSPECIFIED ORGAN INVOLVEMENT: Primary | ICD-10-CM

## 2023-10-16 PROCEDURE — 99999 PR PBB SHADOW E&M-EST. PATIENT-LVL III: ICD-10-PCS | Mod: PBBFAC,,, | Performed by: OPTOMETRIST

## 2023-10-16 PROCEDURE — 99999 PR PBB SHADOW E&M-EST. PATIENT-LVL III: CPT | Mod: PBBFAC,,, | Performed by: OPTOMETRIST

## 2023-10-16 PROCEDURE — 99499 NO LOS: ICD-10-PCS | Mod: S$GLB,,, | Performed by: OPTOMETRIST

## 2023-10-16 PROCEDURE — 99499 UNLISTED E&M SERVICE: CPT | Mod: S$GLB,,, | Performed by: OPTOMETRIST

## 2023-10-16 NOTE — PROGRESS NOTES
VISUAL FIELD TEST 10-2PL-OU-DONE/AD  REL-GOOD-FIX-GOOD-COOP-GOOD/AD  PT HAS NO KNOWN ALLERGIES TO LATEX OR ADHESIVES./AD

## 2023-10-17 NOTE — PATIENT INSTRUCTIONS
History of Sjogren's Syndrome, with secondary dry eye bilaterally.  Has been using Restasis Ophthalmic Emulsion in both eyes two times per day, and using ocular lubricating ointment at bedtime.  Suggested addition of Systane Complete ATs to treatment regimen during the day at the time of the previous visit.     No change made to spectacle lens Rx at the time of the last vist.     Currently taking Plaquenil 400 mg per day.  In today for Richter Visual Field test (10-2) as advised.      HVF 10-2 (Plaquenil visual field) test done today.  No evidence of visual field defect in either eye to suggest Plaquenil toxicity.  Discussed with Ms. Alessio Kumar to continue to take Hydroxychloroquine/Plaquenil (400 mg per day, as prescribed) under the supervision of rheumatologist.    Return in June, 2024 for general eye exam, with OCT of retina at macula and repeat H VF (10-2) at that time.  Will structure recall notice accordingly.
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
## 2023-10-19 NOTE — PROGRESS NOTES
Subjective:      Patient ID: Manasa Hollins is a 47 y.o. female.  New to me but seen previously in clinic by a fellow provider. Pt with hx of sjogrens presents to clinic for ER f/u, see course below. Was evaluated for acute dizziness without acute finding. Today reports mild improvement though having episode of room spinning while remaining still. Can not take systemic antihistamines due to chronic conditions though has intranasal astelin which she is not using. She is currently on immunomodulator though is also under the care of allergist for igg deficit.       Initial [facility]  Medical Decision Making  Patient is a 47-year-old female presenting with multiple episodes of vomiting, lightheadedness, and dizziness since 4:00 a.m..  Unclear etiology at this time.  Patient states that she has had dizziness past had lower suspicion for CVA especially as his tenderness has improved now.  We will give IV fluids as she appears dehydrated from her vomiting.  We will also check labs to evaluate for electrolyte abnormality, DIAMANTE, signs of infection.  Low suspicion for arrhythmia or ACS causing her symptoms we will get EKG and troponin to further evaluate.     Medications  lactated ringers bolus 1,000 mL (0 mLs Intravenous Stopped 10/11/23 1216)  ondansetron injection 4 mg (4 mg Intravenous Given 10/11/23 1112)  meclizine tablet 25 mg (25 mg Oral Given 10/11/23 1112)  iohexoL (OMNIPAQUE 350) injection 100 mL (100 mLs Intravenous Given 10/11/23 1332)     Reassessment and ED Course  ED Course as of 10/12/23 1048  Wed Oct 11, 2023  1300 CBC with stable anemia.  Troponin negative.  CMP okay.  Mag/phos okay.  UA noninfectious.  COVID/flu testing negative.      Re-evaluate the patient he states the dizziness is significantly improved but we will occasionally still feels some room spinning.  Given her continued symptoms, we will get CTA head and neck to further evaluate for CVA though very low suspicion.  [CH]  1400 CTA head and  neck without signs of acute changes.  Discussed results patient.  Discussed need for outpatient follow up with PCP.  Ambulated in the ED without difficulty.  DC to home with return precautions. [CH]     ED Course User Index  [CH] Brenda Breaux MD     Final diagnoses:  [R42] Dizziness  [R11.0] Nausea (Primary)  [R19.7] Diarrhea, unspecified type    Dizziness:   Chronicity:  Recurrent and chronic with acute exacerbation  Onset:  In the past 7 days  Progression since onset:  Waxing and waning  Severity:  Severe  Duration:  Off/on all day  Dizziness characteristics:  Spinning inside head only   Associated symptoms: ear congestion, aural fullness and visual disturbances (under the care of optometry for chronic dry eyes).no hearing loss, no ear pain, no fever, no headaches, no tinnitus, no nausea, no vomiting, no diaphoresis, no weakness, no light-headedness, no syncope, no palpitations, no panic, no facial weakness, no slurred speech, no numbness in extremities and no chest pain.  Aggravated by:  Getting up, lying down and position changes  Treatments tried:  Rest  Improvements on treatment:  Variable   PMH includes: environmental allergies.no strokes, no cardiac surgery, no neurologic disease, no head trauma, no balance testing, no ear trauma, no ear surgery, no head trauma, no ear infections, no anxiety, no ear tubes, no MRI head and no CT head.    Review of Systems   Constitutional:  Negative for activity change, appetite change, chills, diaphoresis, fatigue, fever and unexpected weight change.   HENT:  Positive for sinus pressure/congestion. Negative for nasal congestion, ear pain, hearing loss, postnasal drip, rhinorrhea, sneezing, sore throat, tinnitus and voice change.    Eyes:  Positive for itching, visual disturbance and eye dryness. Negative for photophobia, pain, discharge and redness.   Respiratory:  Negative for cough, chest tightness, shortness of breath and wheezing.    Cardiovascular:  Negative for  "chest pain, palpitations, leg swelling and syncope.   Gastrointestinal:  Negative for abdominal pain, constipation, diarrhea, nausea and vomiting.   Endocrine: Negative.    Genitourinary:  Negative for difficulty urinating, dysuria and menstrual problem.   Musculoskeletal:  Negative for arthralgias, back pain, gait problem and myalgias.   Integumentary:  Negative for rash.   Allergic/Immunologic: Positive for environmental allergies, food allergies and immunocompromised state.   Neurological:  Positive for dizziness and vertigo. Negative for tremors, seizures, syncope, facial asymmetry, speech difficulty, weakness, light-headedness, numbness, headaches, memory loss and coordination difficulties.   Psychiatric/Behavioral: Negative.     All other systems reviewed and are negative.        Objective:     Vitals:    10/13/23 1143   BP: 110/64   Pulse: 78   Resp: 16   Temp: 98.1 °F (36.7 °C)   TempSrc: Oral   SpO2: 98%   Weight: 86.9 kg (191 lb 9.3 oz)   Height: 5' 3" (1.6 m)     Physical Exam  Vitals and nursing note reviewed.   Constitutional:       General: She is not in acute distress.     Appearance: Normal appearance. She is well-developed and well-groomed. She is not ill-appearing.   HENT:      Head: Normocephalic and atraumatic.      Right Ear: Ear canal and external ear normal. No middle ear effusion. No mastoid tenderness. Tympanic membrane is injected and bulging.      Left Ear: Ear canal and external ear normal.  No middle ear effusion. No mastoid tenderness. Tympanic membrane is injected. Tympanic membrane is not retracted.      Nose: Mucosal edema present. No congestion or rhinorrhea.      Mouth/Throat:      Lips: Pink.      Mouth: Mucous membranes are moist.      Pharynx: Uvula midline. Pharyngeal swelling and posterior oropharyngeal erythema present. No oropharyngeal exudate or uvula swelling.   Eyes:      General: Lids are normal. Vision grossly intact. Gaze aligned appropriately.      Conjunctiva/sclera: " Conjunctivae normal.      Pupils: Pupils are equal, round, and reactive to light.   Neck:      Trachea: Phonation normal.   Cardiovascular:      Rate and Rhythm: Normal rate and regular rhythm.      Heart sounds: Normal heart sounds.   Pulmonary:      Effort: Pulmonary effort is normal. No accessory muscle usage or respiratory distress.      Breath sounds: Normal breath sounds and air entry.   Abdominal:      General: Abdomen is flat. Bowel sounds are normal. There is no distension.      Palpations: Abdomen is soft.      Tenderness: There is no abdominal tenderness.   Musculoskeletal:      Cervical back: Neck supple.      Right lower leg: No edema.      Left lower leg: No edema.   Lymphadenopathy:      Cervical: No cervical adenopathy.   Skin:     General: Skin is warm and dry.      Findings: No rash.   Neurological:      General: No focal deficit present.      Mental Status: She is alert and oriented to person, place, and time. Mental status is at baseline.      Sensory: Sensation is intact.      Motor: Motor function is intact.      Coordination: Coordination is intact.      Gait: Gait is intact.   Psychiatric:         Attention and Perception: Attention and perception normal.         Mood and Affect: Mood and affect normal.         Speech: Speech normal.         Behavior: Behavior normal. Behavior is cooperative.         Thought Content: Thought content normal.         Cognition and Memory: Cognition and memory normal.         Judgment: Judgment normal.       EXAMINATION: CTA HEAD AND NECK   Brain:  There is no evidence of hydrocephalus mass effect intracranial hemorrhage or acute territorial infarct.  The brain parenchyma including the cerebellum maintains normal attenuation.  No enhancing intracranial lesion.  Mild scattered sinus mucosal thickening with postsurgical changes.  Mild right greater than left mastoid mucosal thickening.  No gross nasopharyngeal lesion.     CTA:  Streak artifact limits evaluation of  the origin of the vessels from the aortic arch.  No significant stenosis of the vertebral arteries bilaterally.  There is no significant stenosis at the carotid bifurcations by NASCET criteria.  Intracranially there is no major branch advanced stenosis/occlusion at the [place].  No aneurysm. The venous sinuses are patent.  No soft tissue mass is identified in the neck.     Impression:  No acute intracranial process.  No significant stenosis at the carotid bifurcations by NASCET criteria.  No significant vertebral artery stenosis.  No evidence of dissection.  No major branch stenosis/occlusion at the [id number].  The basilar artery is widely patent.     Electronically signed by: Darrell Parker  Date:                                            10/11/2023  Time:                                           13:53  Assessment and Plan:     1. Vertigo  The nature of vertigo syndromes were discussed along with their usual course and treatment.  Educational materials given and questions answered.  Possible sinus trigger though treatment limited due to chronic conditions, resume intranasal astelin and flonase, f/u with ENT if fails to improve or worsen    2. Anti-polysaccharide antibody deficiency  Continue allergist care            MICHEL Rebolledo, FNP-C  Family/Internal Medicine  [facility]

## 2023-10-20 ENCOUNTER — OFFICE VISIT (OUTPATIENT)
Dept: FAMILY MEDICINE | Facility: CLINIC | Age: 48
End: 2023-10-20
Payer: COMMERCIAL

## 2023-10-20 VITALS
HEART RATE: 91 BPM | WEIGHT: 191.81 LBS | OXYGEN SATURATION: 99 % | HEIGHT: 63 IN | TEMPERATURE: 99 F | SYSTOLIC BLOOD PRESSURE: 120 MMHG | DIASTOLIC BLOOD PRESSURE: 80 MMHG | BODY MASS INDEX: 33.98 KG/M2

## 2023-10-20 DIAGNOSIS — R42 VERTIGO: ICD-10-CM

## 2023-10-20 DIAGNOSIS — G47.00 INSOMNIA, UNSPECIFIED TYPE: Primary | ICD-10-CM

## 2023-10-20 PROCEDURE — 3074F PR MOST RECENT SYSTOLIC BLOOD PRESSURE < 130 MM HG: ICD-10-PCS | Mod: CPTII,S$GLB,, | Performed by: FAMILY MEDICINE

## 2023-10-20 PROCEDURE — 3079F DIAST BP 80-89 MM HG: CPT | Mod: CPTII,S$GLB,, | Performed by: FAMILY MEDICINE

## 2023-10-20 PROCEDURE — 99999 PR PBB SHADOW E&M-EST. PATIENT-LVL IV: ICD-10-PCS | Mod: PBBFAC,,, | Performed by: FAMILY MEDICINE

## 2023-10-20 PROCEDURE — 3008F BODY MASS INDEX DOCD: CPT | Mod: CPTII,S$GLB,, | Performed by: FAMILY MEDICINE

## 2023-10-20 PROCEDURE — 3074F SYST BP LT 130 MM HG: CPT | Mod: CPTII,S$GLB,, | Performed by: FAMILY MEDICINE

## 2023-10-20 PROCEDURE — 1159F PR MEDICATION LIST DOCUMENTED IN MEDICAL RECORD: ICD-10-PCS | Mod: CPTII,S$GLB,, | Performed by: FAMILY MEDICINE

## 2023-10-20 PROCEDURE — 99215 OFFICE O/P EST HI 40 MIN: CPT | Mod: S$GLB,,, | Performed by: FAMILY MEDICINE

## 2023-10-20 PROCEDURE — 3044F HG A1C LEVEL LT 7.0%: CPT | Mod: CPTII,S$GLB,, | Performed by: FAMILY MEDICINE

## 2023-10-20 PROCEDURE — 99215 PR OFFICE/OUTPT VISIT, EST, LEVL V, 40-54 MIN: ICD-10-PCS | Mod: S$GLB,,, | Performed by: FAMILY MEDICINE

## 2023-10-20 PROCEDURE — 3044F PR MOST RECENT HEMOGLOBIN A1C LEVEL <7.0%: ICD-10-PCS | Mod: CPTII,S$GLB,, | Performed by: FAMILY MEDICINE

## 2023-10-20 PROCEDURE — 3008F PR BODY MASS INDEX (BMI) DOCUMENTED: ICD-10-PCS | Mod: CPTII,S$GLB,, | Performed by: FAMILY MEDICINE

## 2023-10-20 PROCEDURE — 3079F PR MOST RECENT DIASTOLIC BLOOD PRESSURE 80-89 MM HG: ICD-10-PCS | Mod: CPTII,S$GLB,, | Performed by: FAMILY MEDICINE

## 2023-10-20 PROCEDURE — 99999 PR PBB SHADOW E&M-EST. PATIENT-LVL IV: CPT | Mod: PBBFAC,,, | Performed by: FAMILY MEDICINE

## 2023-10-20 PROCEDURE — 1159F MED LIST DOCD IN RCRD: CPT | Mod: CPTII,S$GLB,, | Performed by: FAMILY MEDICINE

## 2023-10-20 RX ORDER — ONDANSETRON 8 MG/1
8 TABLET, ORALLY DISINTEGRATING ORAL 2 TIMES DAILY
Qty: 20 TABLET | Refills: 2 | Status: ON HOLD | OUTPATIENT
Start: 2023-10-20 | End: 2024-03-21

## 2023-10-20 RX ORDER — TRAZODONE HYDROCHLORIDE 50 MG/1
50-150 TABLET ORAL NIGHTLY
Qty: 90 TABLET | Refills: 11 | Status: SHIPPED | OUTPATIENT
Start: 2023-10-20 | End: 2024-01-02

## 2023-10-20 RX ORDER — ZOLPIDEM TARTRATE 5 MG/1
5 TABLET ORAL NIGHTLY PRN
Qty: 30 TABLET | Refills: 0 | Status: SHIPPED | OUTPATIENT
Start: 2023-10-20 | End: 2024-01-02

## 2023-10-31 ENCOUNTER — TELEPHONE (OUTPATIENT)
Dept: ALLERGY | Facility: CLINIC | Age: 48
End: 2023-10-31
Payer: COMMERCIAL

## 2023-10-31 NOTE — TELEPHONE ENCOUNTER
----- Message from Onelia Bryant sent at 10/27/2023  1:06 PM CDT -----  Regarding: UPDATED LABS NEEDED  Good Afternoon,    Mrs. Hollins's insurance is requesting updated IG levels to warrant the need for continued Hizentra infusions. When she comes in for her annual appointment on 11/06/2023 please order these labs.     Thank you in advance for your assistance.     Onelia

## 2023-11-02 ENCOUNTER — PATIENT MESSAGE (OUTPATIENT)
Dept: FAMILY MEDICINE | Facility: CLINIC | Age: 48
End: 2023-11-02
Payer: COMMERCIAL

## 2023-11-02 ENCOUNTER — PATIENT MESSAGE (OUTPATIENT)
Dept: ALLERGY | Facility: CLINIC | Age: 48
End: 2023-11-02
Payer: COMMERCIAL

## 2023-11-06 ENCOUNTER — LAB VISIT (OUTPATIENT)
Dept: LAB | Facility: HOSPITAL | Age: 48
End: 2023-11-06
Payer: COMMERCIAL

## 2023-11-06 ENCOUNTER — OFFICE VISIT (OUTPATIENT)
Dept: ALLERGY | Facility: CLINIC | Age: 48
End: 2023-11-06
Payer: COMMERCIAL

## 2023-11-06 VITALS — HEIGHT: 63 IN | BODY MASS INDEX: 33.36 KG/M2 | WEIGHT: 188.25 LBS

## 2023-11-06 DIAGNOSIS — J01.00 ACUTE MAXILLARY SINUSITIS, RECURRENCE NOT SPECIFIED: ICD-10-CM

## 2023-11-06 DIAGNOSIS — J98.8 WHEEZING-ASSOCIATED RESPIRATORY INFECTION (WARI): ICD-10-CM

## 2023-11-06 DIAGNOSIS — D80.6 ANTI-POLYSACCHARIDE ANTIBODY DEFICIENCY: Primary | ICD-10-CM

## 2023-11-06 DIAGNOSIS — D80.6 ANTI-POLYSACCHARIDE ANTIBODY DEFICIENCY: ICD-10-CM

## 2023-11-06 LAB
IGA SERPL-MCNC: 618 MG/DL (ref 40–350)
IGG SERPL-MCNC: 5197 MG/DL (ref 650–1600)
IGM SERPL-MCNC: 55 MG/DL (ref 50–300)

## 2023-11-06 PROCEDURE — 1159F PR MEDICATION LIST DOCUMENTED IN MEDICAL RECORD: ICD-10-PCS | Mod: CPTII,S$GLB,, | Performed by: ALLERGY & IMMUNOLOGY

## 2023-11-06 PROCEDURE — 99999 PR PBB SHADOW E&M-EST. PATIENT-LVL III: ICD-10-PCS | Mod: PBBFAC,,, | Performed by: ALLERGY & IMMUNOLOGY

## 2023-11-06 PROCEDURE — 3044F PR MOST RECENT HEMOGLOBIN A1C LEVEL <7.0%: ICD-10-PCS | Mod: CPTII,S$GLB,, | Performed by: ALLERGY & IMMUNOLOGY

## 2023-11-06 PROCEDURE — 1159F MED LIST DOCD IN RCRD: CPT | Mod: CPTII,S$GLB,, | Performed by: ALLERGY & IMMUNOLOGY

## 2023-11-06 PROCEDURE — 3008F PR BODY MASS INDEX (BMI) DOCUMENTED: ICD-10-PCS | Mod: CPTII,S$GLB,, | Performed by: ALLERGY & IMMUNOLOGY

## 2023-11-06 PROCEDURE — 3008F BODY MASS INDEX DOCD: CPT | Mod: CPTII,S$GLB,, | Performed by: ALLERGY & IMMUNOLOGY

## 2023-11-06 PROCEDURE — 36415 COLL VENOUS BLD VENIPUNCTURE: CPT | Performed by: ALLERGY & IMMUNOLOGY

## 2023-11-06 PROCEDURE — 3044F HG A1C LEVEL LT 7.0%: CPT | Mod: CPTII,S$GLB,, | Performed by: ALLERGY & IMMUNOLOGY

## 2023-11-06 PROCEDURE — 82784 ASSAY IGA/IGD/IGG/IGM EACH: CPT | Mod: 59 | Performed by: ALLERGY & IMMUNOLOGY

## 2023-11-06 PROCEDURE — 99214 OFFICE O/P EST MOD 30 MIN: CPT | Mod: S$GLB,,, | Performed by: ALLERGY & IMMUNOLOGY

## 2023-11-06 PROCEDURE — 99214 PR OFFICE/OUTPT VISIT, EST, LEVL IV, 30-39 MIN: ICD-10-PCS | Mod: S$GLB,,, | Performed by: ALLERGY & IMMUNOLOGY

## 2023-11-06 PROCEDURE — 99999 PR PBB SHADOW E&M-EST. PATIENT-LVL III: CPT | Mod: PBBFAC,,, | Performed by: ALLERGY & IMMUNOLOGY

## 2023-11-06 RX ORDER — AMOXICILLIN AND CLAVULANATE POTASSIUM 875; 125 MG/1; MG/1
1 TABLET, FILM COATED ORAL EVERY 12 HOURS
Qty: 28 TABLET | Refills: 0 | Status: SHIPPED | OUTPATIENT
Start: 2023-11-06 | End: 2023-11-20

## 2023-11-06 RX ORDER — PREDNISONE 10 MG/1
TABLET ORAL
Qty: 21 TABLET | Refills: 0 | Status: SHIPPED | OUTPATIENT
Start: 2023-11-06 | End: 2024-01-02

## 2023-11-06 RX ORDER — ALBUTEROL SULFATE 90 UG/1
2 AEROSOL, METERED RESPIRATORY (INHALATION) EVERY 4 HOURS PRN
Qty: 18 G | Refills: 3 | Status: ON HOLD | OUTPATIENT
Start: 2023-11-06 | End: 2024-03-21

## 2023-11-06 NOTE — LETTER
November 6, 2023      [facility]  [address]  [facility] [address]  Phone: [phone]  Fax: [phone]       Patient: Manasa Hollins   YOB: 1975  Date of Visit: 11/06/2023    To Whom It May Concern:    Alberto Hollins  was at [facility] on 11/06/2023. The patient may return to work/school with no restrictions. If you have any questions or concerns, or if I can be of further assistance, please do not hesitate to contact me.    Sincerely,    Beba Borges MA
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
## 2023-11-07 RX ORDER — TRIAMCINOLONE ACETONIDE 1 MG/G
1 OINTMENT TOPICAL 2 TIMES DAILY
Qty: 75 G | Refills: 3 | Status: ON HOLD | OUTPATIENT
Start: 2023-11-07 | End: 2024-03-21

## 2023-11-07 NOTE — TELEPHONE ENCOUNTER
Good morning ,    Kindly find attached message for a Rx refill request.    The LOV was yesterday with you.    Kind regards  Samantha Enriquez MA

## 2023-11-09 RX ORDER — PILOCARPINE HYDROCHLORIDE 7.5 MG/1
TABLET, FILM COATED ORAL
Qty: 360 TABLET | Refills: 0 | Status: SHIPPED | OUTPATIENT
Start: 2023-11-09 | End: 2024-01-02 | Stop reason: SDUPTHER

## 2023-12-19 ENCOUNTER — TELEPHONE (OUTPATIENT)
Dept: RHEUMATOLOGY | Facility: CLINIC | Age: 48
End: 2023-12-19
Payer: COMMERCIAL

## 2023-12-19 ENCOUNTER — TELEPHONE (OUTPATIENT)
Dept: ALLERGY | Facility: CLINIC | Age: 48
End: 2023-12-19

## 2023-12-19 NOTE — TELEPHONE ENCOUNTER
----- Message from Onelia Bryant sent at 12/1/2023  1:43 PM CST -----  Regarding: APPEAL DENIED  Good Afternoon  and Dee Dee,    The appeal that was submitted with updated clinicals/labs from 11/06/2023 has been reviewed by [facility] [place] and the denial has been upheld. I will forward a copy of the denial letter to your email for reference, but below is a summary of why it was denied.      For Manasa Tuttle Denial was upheld.    Appeal DENIED: [facility]  Insurance company: [facility]  Phone#: [phone]  Denial provided via:  Phone  Spoke with: MAHESH MORENO  Drug name/codes requested:    Drug name/codes NO AUTH REQUIRED:   , [id number], [id number]  Denial reason: Rep advised the denial was upheld based on the information that was received on 11/09/2023, it was found to be not medically necessary.  Reference#:  Case - [id number]  Rep will fax us the denial letter. She stated a 2nd level appeal can be submitted via fax to: Appeals dept [phone].    Please let me know if I can be of further assistance in this matter.    Thanks,  Onelia

## 2023-12-19 NOTE — TELEPHONE ENCOUNTER
Called to inform patient of the need to r/s 12/26/23 appointment with Dr. Bustos, due to a change in the provider's schedule.  Patient verbalizes understanding.  Appointment r/s to 1/2/24@ 9:30am.

## 2024-01-01 NOTE — PROGRESS NOTES
Subjective:       Patient ID: Manasa Hollins is a 48 y.o. female with Sjogren's syndrome on HCQ & pilocarpine    Chief Complaint: No chief complaint on file.      49 yo lady we are following for Sjogren's last seen on 11/22/22.  She has been on HCQ (last eye exam 10/16/23) & pilocarpine chronically.  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  She also has with IgG2 deficiency, low IgM and recurrent ear & sinus infections.   Recently had several attacks of vertigo.     SjS complaints unchanged to improved. She is taking 7.5 mg pilocarpine .No new dental issues. Dry eyes stable. Uses OTC eye drops & restasis  Also on  mg/d. Getting eyes checked periodically.   Denies joint pain. Denies true Raynaud's, tight skin, pericarditis, photosensitivity, miscarriages (but had one premature birth at 26.5 weeks) & thromboses.     Main issue L knee hurts w walking/standing wo much swelling. Has used cold wo much improvement.  Hears clicking w movement. No falls. No instability.  One day hx of R wrist pain; no trauma; no swelling;      Sleep disturbance continues; ambien & trazodone unhelpful.  Has chronic anemia.  Has vasomotor sxs of menopause.    Chronically runs low alk phos, but other than fibro type sxs no compelling evidence for HPP.    Pertinent hx from previous visit:   40 yr old lady with chronic sinusitis with dry nose, mouth and & dry eyes (keratitis documented-photo displayed on 10/12/15 visit) initially sent to rule out Sjogren's after she requested testing and was found to have a +MANISHA 1:250 sp with +SSA & +SSB. Found to have non tender parotid gland enlargement.  She was previously begun on pilocarpine 5 mg qid  Sxs remain the same--she minimizes them. She did not check with pulmonary as she was admitted in November with fever and bilateral otomastoiditis. She is now improved. Gets does get  frequent dental cleaning     Current Outpatient Medications   Medication Sig Dispense Refill    acetaminophen (TYLENOL ARTHRITIS ORAL) Take by mouth.      albuterol (PROVENTIL/VENTOLIN HFA) 90 mcg/actuation inhaler Inhale 2 puffs into the lungs every 4 (four) hours as needed for Wheezing or Shortness of Breath. 18 g 3    ascorbic acid/multivit-min (EMERGEN-C ORAL) Take by mouth.      azelastine (ASTELIN) 137 mcg (0.1 %) nasal spray SPRAY 2 SPRAYS IN EACH NOSTRIL TWICE A DAY. 30 mL 3    CARBOXYMETHYLCELLULOSE SODIUM (REFRESH TEARS OPHT) Apply 2 drops to eye as needed.       cycloSPORINE (RESTASIS) 0.05 % ophthalmic emulsion Place 1 drop into both eyes 2 (two) times daily. 60 vial 5    fluoride, sodium, (PREVIDENT) 0.2 % Soln USE AS DIRECTED      fluticasone propionate (FLONASE) 50 mcg/actuation nasal spray SPRAY 2 SPRAYS IN EACH NOSTRIL TWICE A DAY 32 mL 11    hydrOXYzine pamoate (VISTARIL) 25 MG Cap Take 1 capsule (25 mg total) by mouth every 6 (six) hours as needed (Anxiety). 25 capsule 0    LIDOcaine-prilocaine (EMLA) cream APPLY TO AFFECTED AREA EVERY DAY AS NEEDED 30 g 4    LINZESS 145 mcg Cap capsule Take 1 capsule (145 mcg total) by mouth once daily. 90 capsule 3    mv,calcium,min/iron/folic/vitK (ONE-A-DAY WOMEN'S COMPLETE ORAL) Take by mouth.      omeprazole (PRILOSEC) 40 MG capsule Take 1 capsule (40 mg total) by mouth every morning. 90 capsule 3    ondansetron (ZOFRAN-ODT) 8 MG TbDL Take 1 tablet (8 mg total) by mouth 2 (two) times daily. 20 tablet 2    saliva stimulant comb. no.3 (BIOTENE) Spry by Mucous Membrane route as needed.       SALIVA SUBSTITUTION COMBO NO.9 (BIOTENE DRY MOUTH ORAL RINSE MM) by Mucous Membrane route as needed.       triamcinolone acetonide 0.1% (KENALOG) 0.1 % ointment APPLY TOPICALLY TWICE A DAY 75 g 3    hydroxychloroquine (PLAQUENIL) 200 mg tablet Take 2 tablets (400 mg total) by mouth once daily. This medication requires periodic eye exams 180 tablet 3    pilocarpine HCl  (SALAGEN) 7.5 mg tablet TAKE 1 TABLET (7.5 MG TOTAL) BY MOUTH 4 (FOUR) TIMES DAILY. 360 tablet 3     No current facility-administered medications for this visit.               Review of Systems   Constitutional:  Positive for diaphoresis. Negative for fatigue, fever and unexpected weight change.   HENT:  Positive for trouble swallowing. Negative for mouth sores, sore throat and tinnitus.         Dry mouth   Eyes:  Negative for photophobia, redness and visual disturbance.        Dry eyes   Respiratory:  Positive for cough (dry; all day) and shortness of breath. Negative for choking and chest tightness.    Cardiovascular: Negative.  Negative for chest pain, palpitations and leg swelling.   Gastrointestinal:  Positive for constipation (off linzess--ran out;). Negative for abdominal distention, abdominal pain, blood in stool, diarrhea, nausea and vomiting.        Heartburn   Endocrine: Positive for cold intolerance.   Genitourinary: Negative.  Negative for dysuria, frequency, genital sores, hematuria and menstrual problem.        LMP 2023 -- having hot flushes & night sweats.    Musculoskeletal:  Positive for arthralgias (R trochanteric bursitis; on & off). Negative for back pain, joint swelling, myalgias, neck pain and neck stiffness.   Skin:  Negative for color change and rash.   Allergic/Immunologic: Negative.    Neurological:  Positive for dizziness and headaches. Negative for syncope, weakness, light-headedness and numbness.   Hematological: Negative.  Negative for adenopathy. Does not bruise/bleed easily.   Psychiatric/Behavioral:  Positive for dysphoric mood (in the past) and sleep disturbance. The patient is nervous/anxious.         Gets curran. Wants to cry but doesn't have tears.          Review of patient's allergies indicates:   Allergen Reactions    Sulfa dyne      Other reaction(s): CAUSES ASTHMA ATTACK         Past Surgical History:   Procedure Laterality Date     SECTION      x4    COLONOSCOPY  "N/A 2019    Procedure: COLONOSCOPY;  Surgeon: Karri Barragan MD;  Location: [facility] ([address] [place] [address]);  Service: Endoscopy;  Laterality: N/A;    MYOMECTOMY      SINUS SURGERY      TUBAL LIGATION      done during myometomy surgery.    upper gi  2016         Family History   Problem Relation Age of Onset    Cancer Mother         Lung/Cervical    Glaucoma Father     Diabetes Father     No Known Problems Sister     No Known Problems Brother     No Known Problems Maternal Aunt     No Known Problems Maternal Uncle     No Known Problems Paternal Aunt     No Known Problems Paternal Uncle     Cancer Maternal Grandmother         Breast/Cervical    No Known Problems Maternal Grandfather     No Known Problems Paternal Grandmother     No Known Problems Paternal Grandfather     Eczema Child     Cancer Other         Breast     Emphysema Neg Hx     Colon cancer Neg Hx     Stomach cancer Neg Hx     Esophageal cancer Neg Hx     Ulcerative colitis Neg Hx     Crohn's disease Neg Hx     Irritable bowel syndrome Neg Hx     Celiac disease Neg Hx     Amblyopia Neg Hx     Blindness Neg Hx     Cataracts Neg Hx     Hypertension Neg Hx     Macular degeneration Neg Hx     Retinal detachment Neg Hx     Strabismus Neg Hx     Stroke Neg Hx     Thyroid disease Neg Hx    Mom & Dad both had sickle cell trait.  Mom developed depression.   Mom  age 53 with lung cancer (non smoker) & had cervical cancer and another cancer as well.  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  Was 206 lb on 8/13/18  Was 205 lb 6.4 oz on 2/8/18  Was 208 lbs 14.4 oz on 3/28/17  Was 198 lbs 12.8 oz on 11/22/16.  Was 191 lbs 6.4 oz on 7/21/16.   Physical Exam   Constitutional: She is oriented to person, place, and time. She appears obese. No distress.   Rocking back & forth;    HENT:   Head: Normocephalic and atraumatic.   Mouth/Throat: Oropharynx is clear and moist. No oropharyngeal exudate.   No facial rashes  Parotids minimally enlarged &, non tender  Lacrimals minimally enlarged  Decreased  moisture of oral mucosa   Teeth in good repair  No oral ulcers     Eyes: Pupils are equal, round, and reactive to light. Conjunctivae are normal. Right eye exhibits no discharge. Left eye exhibits no discharge. No scleral icterus.   Neck: No JVD present. No tracheal deviation present. No thyromegaly present.   Cardiovascular: Normal rate, regular rhythm and normal heart sounds. Exam reveals no gallop and no friction rub.   No murmur heard.  Pulmonary/Chest: Breath sounds normal. No respiratory distress. She has no wheezes. She has no rales. She exhibits no tenderness.   Abdominal: Soft. Bowel sounds are normal. She exhibits no distension and no mass. There is no splenomegaly or hepatomegaly. There is no abdominal tenderness. There is no rebound and no guarding.   Musculoskeletal:         General: No tenderness. Normal range of motion.      Cervical back: Neck supple.      Comments: Cspine FROM no tenderness  Tspine FROM no tenderness  Lspine FROM no tenderness.  TMJ: unremarkable  Shoulders: FROM; no synovitis;  Elbows: FROM; no synovitis; no tophi or nodules  Wrists: FROM; no synovitis;    MCPs: FROM; Mild enlargement of 1st MCP on left; no synovitis; no metacarpalgia;  ok;  PIPs: FROM; no synovitis; no tenderness of any joints.   DIPs: FROM; no synovitis;   HIPS: FROM;   Knees:  FROM; bilateral crepitus; no synovitis; no instability;  Ankles: FROM: no synovitis   Toes: ok; no metatarsalgia       Lymphadenopathy:     She has no cervical adenopathy.   Neurological: She is alert and oriented to person, place, and time. She has normal reflexes. No cranial nerve deficit. Gait normal.   Proximal and distal muscle strength 5/5 .   Skin: Skin is warm and dry. She is not diaphoretic.   Psychiatric: Her behavior is normal. Mood, memory, affect and judgment normal.   Vitals reviewed.      LABS:  23: IgG 5197; IgA 618  10/11/23: CBC Hg 10.3; Ht 32; CMP Na 134; TP 10.2; Alb 3.3; AlkP 36;   22: Vit B6 ok at 28;  22: CBC Hg 9.8; Ht 29.5; CMP Na 131; AlkP 28; TP 9.8; Alb 3.0  21: CRP 3.5; CBC Hg 9.9; Ht 31.7; CMP TP 9.6; alb 3.2;   20: ESR 48 (36); CRP 3.2;  Hg 9.9; Ht 31.7;  CMP Na 135; TP 10.8; alb 3.2; ; Vit D 55; --last  19: TSH ok;   3/28/19: ; CRP 5.7; CBC Hg 9.5; Ht 30.2;  Sat iron 10 %(20); CMP TP 10.5; Alb 3.0; MANISHA >1:2560 H/sp; +SSA+SSB; Ig  3/13/19: Vit D 21; Vit B12 ok;   18: CBC Hg 9.1 Ht 29.1; CMP TP 9.9; alb 3.1;  IgG 4488 (1600); IgM 44 (50); IgA 459 (350)  17: CMP TP 10.0; Alb. 3.0;  17: Hg 8.9; Ht 28.1  17: ; CRP 4.6; Hg 9.4; Ht 28.9; Na 133; TP 10.1; Alb. 2.6;   16: Hg 9.8; Ht 29.6; Na 135; Alb. 2.9;   16: ; CRP 3.9; Hg 8.2; Ht 25.2; CMP TP 9.4; Alb. 2.4; Ca 8.4; CPK 88; SPE elevated TP & gammaglobulins with oligoclonal bands.   16: ;   5/3/16: Hg 8.9; Ht 27.5; IgG1 3490 (1140); IgG2 56 (150);  16: ; CRP 6.7; Hg 9.1; Ht 28.7; CMP Alb. 2.8; TP 9.4; UA ok; U pr/cnne .37 (.20)  3/11/16: CMP Ca 8.2; Alb. 1.4  3/7/16: UA 3+ pr; 2+ OB; * RBCs 16 WBC, 9 HC  12/1/15: Hg 7.3; Ht 22.5; K+ 3.4; Ca 8.6;   8/18/15: UA ok;  8/12/15: LAC neg; aCLA neg; C3, C4 ok; dsDNA neg;       Imagin/17/21: Bilateral knees: personally viewed: ok  21: Bilateral hips: personally viewed: mild  "OA; ? phleboliths  2/17/21: CXR: personally viewed: ok;  7/21/16: Bilateral knee x-rays: personally reviewed: very mild bilateral DJD.  5/2/16: CT chest: personally reviewed: unremarkable.  4/21/16: CXR: personally reviewed: prob. Ok; RLL opacity c/w scarring  10/12/15: CXR: personally reviewed with patient: OK  10/12/15: Bilateral Hands: personally reviewed with patient: ok  Assessment:   Sjogren's syndrome -- stable     Sicca sxs with dry mouth with dental carries, dry eyes & dry mouth     Response to pilocarpine      But cough & mild SOB following--patient opts to continue    Bilateral parotid gland swelling    +MANISHA 1:2560; +SSA; +SSB: TP 10.5;     RF neg; LAC, aCLA neg (premature birth at 26.5 wks).     C3, C4, UA ok; elevated IgG 4489 (1600); IgA 495 (350);     + FH SLE (sister)    On  mg/d & pilocarpine 7.5 mg qid     L knee pain w padmini PF crepitus   C/w chondromalacia  Imaging with minimal bilateral OA    Recent hx of vertigo   R/O Meniere's    Anemia:  NCNC--chronic; probably mostly chronic disease (according to ) with mild iron deficiency   Hg electrophoresis A2   Lowest Ht 18.1; highest 34.6   Mom & Dad had sickle cell trait   Some component of iron deficiency    retic ok; MMA ok;    G6PD ok; hapto not low    Vitamin D insufficiency--treated.    Persistently low AlkP  since 2014   Min 21; Max 40   R/O hypophosphatasia--doubt but has fibro pain    No abnormal stature; no bowed legs; hx some dental issues..     Some features of FMS   Hx of insomnia/fatigue/TTP     Recurrent ENT & possibly pulmonary infections associated with IgG2 deficiency   S/P bilateral otomastoiditis with fever hospitalized 11/15    Possibly parotidomegaly played a role.   Pulmonary issues:     S/P CAP 3/17/16:     Also with chronic cough & SOB     She's had "allergic cough" in past.     Salagen as a culprit ruled out     CT chest 5/2/16: unremarkable   Inadequate response to pneumovax    IgG2 deficiency & low IgM   S/P " Hizentra      Reactive Airways/Asthma    Menopausal      Obesity      Plan:   Continue pilocarpine to 7.5 mg up to qid prn.  Stay on  mg/d w eye exams both for HCQ & dry eyes.   Continue OTC for dry mouth and eyes prn  Quad strengthening exercises shown.  Daily aerobic exercises  Gave her number to ENT for vertigo.  Zn & Mg ordered for hypophosphatasia but not scheduled.  She will get them done next time labs are drawn.  Ditto: imaging both knees & hands to be done prn.  Weight loss.   Discussed menopause. Handout provided.       RTC 6 months or prn.

## 2024-01-02 ENCOUNTER — OFFICE VISIT (OUTPATIENT)
Dept: RHEUMATOLOGY | Facility: CLINIC | Age: 49
End: 2024-01-02
Payer: COMMERCIAL

## 2024-01-02 VITALS
SYSTOLIC BLOOD PRESSURE: 110 MMHG | WEIGHT: 197.06 LBS | HEIGHT: 63 IN | HEART RATE: 71 BPM | DIASTOLIC BLOOD PRESSURE: 70 MMHG | BODY MASS INDEX: 34.91 KG/M2

## 2024-01-02 DIAGNOSIS — M35.09 SJOGREN'S SYNDROME WITH OTHER ORGAN INVOLVEMENT: Primary | ICD-10-CM

## 2024-01-02 DIAGNOSIS — Z79.899 LONG TERM USE OF DRUG: ICD-10-CM

## 2024-01-02 DIAGNOSIS — K11.1 HYPERTROPHY OF SALIVARY GLAND: ICD-10-CM

## 2024-01-02 DIAGNOSIS — Z79.899 LONG-TERM USE OF HYDROXYCHLOROQUINE: ICD-10-CM

## 2024-01-02 DIAGNOSIS — M22.41 CHONDROMALACIA OF BOTH PATELLAE: ICD-10-CM

## 2024-01-02 DIAGNOSIS — R74.8 LOW SERUM ALKALINE PHOSPHATASE: ICD-10-CM

## 2024-01-02 DIAGNOSIS — R42 VERTIGO: ICD-10-CM

## 2024-01-02 DIAGNOSIS — Z78.0 MENOPAUSE: ICD-10-CM

## 2024-01-02 DIAGNOSIS — M22.42 CHONDROMALACIA OF BOTH PATELLAE: ICD-10-CM

## 2024-01-02 DIAGNOSIS — M25.531 ACUTE PAIN OF RIGHT WRIST: ICD-10-CM

## 2024-01-02 PROCEDURE — 1160F RVW MEDS BY RX/DR IN RCRD: CPT | Mod: CPTII,S$GLB,, | Performed by: INTERNAL MEDICINE

## 2024-01-02 PROCEDURE — 99999 PR PBB SHADOW E&M-EST. PATIENT-LVL V: CPT | Mod: PBBFAC,,, | Performed by: INTERNAL MEDICINE

## 2024-01-02 PROCEDURE — 1159F MED LIST DOCD IN RCRD: CPT | Mod: CPTII,S$GLB,, | Performed by: INTERNAL MEDICINE

## 2024-01-02 PROCEDURE — 99214 OFFICE O/P EST MOD 30 MIN: CPT | Mod: S$GLB,,, | Performed by: INTERNAL MEDICINE

## 2024-01-02 PROCEDURE — 3074F SYST BP LT 130 MM HG: CPT | Mod: CPTII,S$GLB,, | Performed by: INTERNAL MEDICINE

## 2024-01-02 PROCEDURE — 3078F DIAST BP <80 MM HG: CPT | Mod: CPTII,S$GLB,, | Performed by: INTERNAL MEDICINE

## 2024-01-02 PROCEDURE — 3008F BODY MASS INDEX DOCD: CPT | Mod: CPTII,S$GLB,, | Performed by: INTERNAL MEDICINE

## 2024-01-02 RX ORDER — PILOCARPINE HYDROCHLORIDE 7.5 MG/1
TABLET, FILM COATED ORAL
Qty: 360 TABLET | Refills: 3 | Status: SHIPPED | OUTPATIENT
Start: 2024-01-02

## 2024-01-02 RX ORDER — HYDROXYCHLOROQUINE SULFATE 200 MG/1
400 TABLET, FILM COATED ORAL DAILY
Qty: 180 TABLET | Refills: 3 | Status: SHIPPED | OUTPATIENT
Start: 2024-01-02

## 2024-01-02 ASSESSMENT — ROUTINE ASSESSMENT OF PATIENT INDEX DATA (RAPID3)
PATIENT GLOBAL ASSESSMENT SCORE: 3
FATIGUE SCORE: 3
PSYCHOLOGICAL DISTRESS SCORE: 3.3
TOTAL RAPID3 SCORE: 2.5
PAIN SCORE: 3.5
AM STIFFNESS SCORE: 0, NO
MDHAQ FUNCTION SCORE: 0.3

## 2024-01-02 NOTE — PROGRESS NOTES
11/21/2022     5:02 PM   Rapid3 Question Responses and Scores   MDHAQ Score 0.4   Psychologic Score 3.3   Pain Score 10   When you awakened in the morning OVER THE LAST WEEK, did you feel stiff? Yes   If Yes, please indicate the number of hours until you are as limber as you will be for the day 1   Fatigue Score 8   Global Health Score 0.5   RAPID3 Score 3.94

## 2024-01-02 NOTE — PATIENT INSTRUCTIONS
Do the quadriceps strengthening exercises shown.    Continue breaking up your day with some aerobic exercise as discussed.    Call to make an appointment with ENT for vertigo. You saw Dr. Bernardo in the past.  Telephone: [phone]  R/O Kirsten's      Next time labs are done, I have ordered a Zinc & Magnesium level for your chronically low alkaline phosphatase.  Please ask whoever is doing labs to add.

## 2024-01-24 ENCOUNTER — PATIENT MESSAGE (OUTPATIENT)
Dept: ALLERGY | Facility: CLINIC | Age: 49
End: 2024-01-24
Payer: COMMERCIAL

## 2024-02-12 DIAGNOSIS — K59.09 CHRONIC CONSTIPATION: ICD-10-CM

## 2024-02-12 RX ORDER — LINACLOTIDE 145 UG/1
145 CAPSULE, GELATIN COATED ORAL
Qty: 90 CAPSULE | Refills: 3 | Status: CANCELLED | OUTPATIENT
Start: 2024-02-12

## 2024-02-13 NOTE — TELEPHONE ENCOUNTER
No care due was identified.  [facility] Embedded Care Due Messages. Reference number: [id number].   2/12/2024 8:10:12 PM CST

## 2024-02-14 RX ORDER — PLECANATIDE 3 MG/1
1 TABLET ORAL DAILY
Qty: 30 TABLET | Refills: 2 | Status: ON HOLD | OUTPATIENT
Start: 2024-02-14 | End: 2024-03-21

## 2024-03-18 ENCOUNTER — ON-DEMAND VIRTUAL (OUTPATIENT)
Dept: URGENT CARE | Facility: CLINIC | Age: 49
End: 2024-03-18
Payer: COMMERCIAL

## 2024-03-18 ENCOUNTER — OFFICE VISIT (OUTPATIENT)
Dept: URGENT CARE | Facility: CLINIC | Age: 49
End: 2024-03-18
Payer: COMMERCIAL

## 2024-03-18 VITALS
HEART RATE: 97 BPM | SYSTOLIC BLOOD PRESSURE: 114 MMHG | HEIGHT: 63 IN | WEIGHT: 183.19 LBS | BODY MASS INDEX: 32.46 KG/M2 | OXYGEN SATURATION: 99 % | TEMPERATURE: 98 F | RESPIRATION RATE: 18 BRPM | DIASTOLIC BLOOD PRESSURE: 70 MMHG

## 2024-03-18 DIAGNOSIS — H60.391 ACUTE INFECTIVE OTITIS EXTERNA, RIGHT: Primary | ICD-10-CM

## 2024-03-18 DIAGNOSIS — H92.01 RIGHT EAR PAIN: Primary | ICD-10-CM

## 2024-03-18 PROCEDURE — 99213 OFFICE O/P EST LOW 20 MIN: CPT | Mod: 95,,, | Performed by: FAMILY MEDICINE

## 2024-03-18 PROCEDURE — 99213 OFFICE O/P EST LOW 20 MIN: CPT | Mod: S$GLB,,,

## 2024-03-18 RX ORDER — CIPROFLOXACIN AND DEXAMETHASONE 3; 1 MG/ML; MG/ML
4 SUSPENSION/ DROPS AURICULAR (OTIC) 2 TIMES DAILY
Qty: 7.5 ML | Refills: 0 | Status: ON HOLD | OUTPATIENT
Start: 2024-03-18 | End: 2024-03-21

## 2024-03-18 NOTE — PATIENT INSTRUCTIONS
Please use antibiotic-steroid ear drops twice a day for 7 days.  Use a heating pad or warm water bottle on the ear to help ease the pain. If your doctor tells you to use heat, put a heating pad on your ear for no more than 20 minutes at a time. Never go to sleep with a heating pad on as this can cause burns.  You can also try ice to help ease your pain. Place an ice pack or a bag of frozen peas wrapped in a towel over the painful part. Never put ice right on the skin. Do not leave the ice on more than 10 to 15 minutes at a time.  Do not put anything in your ear unless it was ordered by the doctor.  If not allergic, please take over the counter Tylenol (Acetaminophen) and/or Motrin (Ibuprofen) as directed for control of pain and/or fever.      Please remember that you have received care at an urgent care today. Urgent cares are not emergency rooms and are not equipped to handle life threatening emergencies and cannot rule in or out certain medical conditions and you may be released before all of your medical problems are known or treated. Please arrange follow up with your primary care physician or speciality clinic  within 2-5 days if your signs and symptoms have not resolved or worsen. Patient can call our Referral Hotline at [phone] to make an appointment.    Please return here or go to the Emergency Department for any concerns or worsening of condition.Patient was educated on signs/symptoms that would warrant emergent medical attention. Patient verbalized understanding.  Your symptoms are not getting better in 2 to 3 days.  You continue to have problems hearing after 2 to 3 weeks.  You have a fever of 100.4°F (38°C) or higher or chills.  You have more discharge or fluid coming from your ear.

## 2024-03-18 NOTE — PROGRESS NOTES
"Subjective:      Patient ID: Manasa Hollins is a 48 y.o. female.    Vitals:  height is 5' 3" (1.6 m) and weight is 83.1 kg (183 lb 3.2 oz). Her oral temperature is 98.3 °F (36.8 °C). Her blood pressure is 114/70 and her pulse is 97. Her respiration is 18 and oxygen saturation is 99%.     Chief Complaint: Otalgia    Pt is here for Right ear pain and drainage and chills. Pt symp started yesterday. Pt felt drainage coming out of her ear, it was clear. Pt hasn't treated with anything.    Provider note starts below:  Patient presents to clinic for evaluation of right ear pain and drainage which onset yesterday. Patient states "I can feel my heartbeat in my ear". States the ear is painful with any tugging or pressure on the outer ear. Patient wears ear buds at work and noticed her ear buds were wet with fluid yesterday. She also reports decreased hearing since symptoms onset. She has not taken any medications. Denies fever, chills, headache, tinnitus, foreign body in ear, congestion, sore throat, chest pain, shortness of breath, nausea, vomiting, dizziness. No other complaints.         Otalgia   There is pain in the right ear. This is a new problem. The current episode started yesterday. The problem occurs constantly. The problem has been unchanged. There has been no fever. The fever has been present for Less than 1 day. The pain is at a severity of 8/10. The pain is severe. Associated symptoms include ear discharge and hearing loss. Pertinent negatives include no abdominal pain, coughing, neck pain, rash, sore throat or vomiting. She has tried nothing for the symptoms. The treatment provided no relief.     Constitution: Negative for chills, fatigue and fever.   HENT:  Positive for ear pain, ear discharge and hearing loss. Negative for foreign body in ear, tinnitus, dental problem, drooling, congestion, sinus pain and sore throat.    Neck: Negative for neck pain and neck stiffness.   Cardiovascular:  Negative for " chest pain and palpitations.   Eyes:  Negative for eye discharge, eye itching, double vision and blurred vision.   Respiratory:  Negative for cough and shortness of breath.    Gastrointestinal:  Negative for abdominal pain, nausea and vomiting.   Musculoskeletal:  Negative for muscle cramps and muscle ache.   Skin:  Negative for pale and rash.   Neurological:  Negative for dizziness, history of vertigo, light-headedness, disorientation and altered mental status.   Psychiatric/Behavioral:  Negative for altered mental status, disorientation and confusion.       Objective:     Physical Exam   Constitutional: She is oriented to person, place, and time.  Non-toxic appearance. She does not appear ill. No distress.   HENT:   Head: Normocephalic and atraumatic.   Ears:   Right Ear: There is drainage, swelling and tenderness. Tympanic membrane is not perforated and not erythematous. A middle ear effusion is present.   Left Ear: Tympanic membrane, external ear and ear canal normal.      Comments: Tenderness with auricular movement. No mastoid tenderness.   Nose: Nose normal.   Mouth/Throat: Mucous membranes are moist. Oropharynx is clear.   Eyes: Conjunctivae are normal. Extraocular movement intact   Neck: Neck supple.   Cardiovascular: Normal rate, regular rhythm, normal heart sounds and normal pulses.   Pulmonary/Chest: Effort normal and breath sounds normal. She has no wheezes. She has no rhonchi. She has no rales.   Abdominal: Normal appearance.   Musculoskeletal: Normal range of motion.         General: Normal range of motion.   Neurological: She is oriented to person, place, and time and at baseline.   Skin: Skin is warm and dry.   Psychiatric: Her behavior is normal. Mood normal.   Nursing note and vitals reviewed.    Assessment:     1. Acute infective otitis externa, right      Plan:     Acute infective otitis externa, right  -     ciprofloxacin-dexAMETHasone 0.3-0.1% (CIPRODEX) 0.3-0.1 % DrpS; Place 4 drops into the  right ear 2 (two) times daily. for 7 days  Dispense: 7.5 mL; Refill: 0      Patient Instructions   Please use antibiotic-steroid ear drops twice a day for 7 days.  Use a heating pad or warm water bottle on the ear to help ease the pain. If your doctor tells you to use heat, put a heating pad on your ear for no more than 20 minutes at a time. Never go to sleep with a heating pad on as this can cause burns.  You can also try ice to help ease your pain. Place an ice pack or a bag of frozen peas wrapped in a towel over the painful part. Never put ice right on the skin. Do not leave the ice on more than 10 to 15 minutes at a time.  Do not put anything in your ear unless it was ordered by the doctor.  If not allergic, please take over the counter Tylenol (Acetaminophen) and/or Motrin (Ibuprofen) as directed for control of pain and/or fever.      Please remember that you have received care at an urgent care today. Urgent cares are not emergency rooms and are not equipped to handle life threatening emergencies and cannot rule in or out certain medical conditions and you may be released before all of your medical problems are known or treated. Please arrange follow up with your primary care physician or speciality clinic  within 2-5 days if your signs and symptoms have not resolved or worsen. Patient can call our Referral Hotline at [phone] to make an appointment.    Please return here or go to the Emergency Department for any concerns or worsening of condition.Patient was educated on signs/symptoms that would warrant emergent medical attention. Patient verbalized understanding.  Your symptoms are not getting better in 2 to 3 days.  You continue to have problems hearing after 2 to 3 weeks.  You have a fever of 100.4°F (38°C) or higher or chills.  You have more discharge or fluid coming from your ear.

## 2024-03-18 NOTE — LETTER
March 18, 2024      [facility]  [address]  [place] [address]  Phone: [phone]  Fax: [phone]       Patient: Manasa Hollins   YOB: 1975  Date of Visit: 03/18/2024    To Whom It May Concern:    Alberto Hollins  was at [facility] on 03/18/2024. The patient may return to work/school on 3/20/2024 with no restrictions. If you have any questions or concerns, or if I can be of further assistance, please do not hesitate to contact me.    Sincerely,      Elena Madrigal PA-C

## 2024-03-18 NOTE — PROGRESS NOTES
Subjective:      Patient ID: Manasa Hollins is a 48 y.o. female.    Vitals:  vitals were not taken for this visit.     Chief Complaint: No chief complaint on file.      Visit Type: TELE AUDIOVISUAL    Present with the patient at the time of consultation: TELEMED PRESENT WITH PATIENT: None    Past Medical History:   Diagnosis Date    Allergy     Asthma     Eye injury at age 19    hit in os     General anesthetics causing adverse effect in therapeutic use     GERD (gastroesophageal reflux disease)     Iron deficiency anemia     Sickle cell trait 2019    Sjogren's disease      Past Surgical History:   Procedure Laterality Date     SECTION      x4    COLONOSCOPY N/A 2019    Procedure: COLONOSCOPY;  Surgeon: Karri Barragan MD;  Location: [address]);  Service: Endoscopy;  Laterality: N/A;    MYOMECTOMY      SINUS SURGERY      TUBAL LIGATION      done during myometomy surgery.    upper gi  2016     Review of patient's allergies indicates:   Allergen Reactions    Sulfa dyne Shortness Of Breath     Other reaction(s): CAUSES ASTHMA ATTACK, Is not sure of the name of the medication that she is allergic to    Peanut Hives     Causes tongue and lips to itch    Sulfa (sulfonamide antibiotics) Other (See Comments)     Current Outpatient Medications on File Prior to Visit   Medication Sig Dispense Refill    acetaminophen (TYLENOL ARTHRITIS ORAL) Take by mouth.      albuterol (PROVENTIL/VENTOLIN HFA) 90 mcg/actuation inhaler Inhale 2 puffs into the lungs every 4 (four) hours as needed for Wheezing or Shortness of Breath. 18 g 3    ascorbic acid/multivit-min (EMERGEN-C ORAL) Take by mouth.      azelastine (ASTELIN) 137 mcg (0.1 %) nasal spray SPRAY 2 SPRAYS IN EACH NOSTRIL TWICE A DAY. 30 mL 3    CARBOXYMETHYLCELLULOSE SODIUM (REFRESH TEARS OPHT) Apply 2 drops to eye as needed.       cycloSPORINE (RESTASIS) 0.05 % ophthalmic emulsion Place 1 drop into both eyes 2 (two) times daily. 60  vial 5    fluoride, sodium, (PREVIDENT) 0.2 % Soln USE AS DIRECTED      fluticasone propionate (FLONASE) 50 mcg/actuation nasal spray SPRAY 2 SPRAYS IN EACH NOSTRIL TWICE A DAY 32 mL 11    hydroxychloroquine (PLAQUENIL) 200 mg tablet Take 2 tablets (400 mg total) by mouth once daily. This medication requires periodic eye exams 180 tablet 3    hydrOXYzine pamoate (VISTARIL) 25 MG Cap Take 1 capsule (25 mg total) by mouth every 6 (six) hours as needed (Anxiety). 25 capsule 0    LIDOcaine-prilocaine (EMLA) cream APPLY TO AFFECTED AREA EVERY DAY AS NEEDED 30 g 4    LINZESS 145 mcg Cap capsule Take 1 capsule (145 mcg total) by mouth once daily. 90 capsule 3    mv,calcium,min/iron/folic/vitK (ONE-A-DAY WOMEN'S COMPLETE ORAL) Take by mouth.      omeprazole (PRILOSEC) 40 MG capsule Take 1 capsule (40 mg total) by mouth every morning. 90 capsule 3    ondansetron (ZOFRAN-ODT) 8 MG TbDL Take 1 tablet (8 mg total) by mouth 2 (two) times daily. 20 tablet 2    pilocarpine HCl (SALAGEN) 7.5 mg tablet TAKE 1 TABLET (7.5 MG TOTAL) BY MOUTH 4 (FOUR) TIMES DAILY. 360 tablet 3    plecanatide (TRULANCE) 3 mg Tab Take 1 tablet by mouth once daily. 30 tablet 2    saliva stimulant comb. no.3 (BIOTENE) Spry by Mucous Membrane route as needed.       SALIVA SUBSTITUTION COMBO NO.9 (BIOTENE DRY MOUTH ORAL RINSE MM) by Mucous Membrane route as needed.       triamcinolone acetonide 0.1% (KENALOG) 0.1 % ointment APPLY TOPICALLY TWICE A DAY 75 g 3     No current facility-administered medications on file prior to visit.     Family History   Problem Relation Age of Onset    Cancer Mother         Lung/Cervical    Glaucoma Father     Diabetes Father     No Known Problems Sister     No Known Problems Brother     No Known Problems Maternal Aunt     No Known Problems Maternal Uncle     No Known Problems Paternal Aunt     No Known Problems Paternal Uncle     Cancer Maternal Grandmother         Breast/Cervical    No Known Problems Maternal Grandfather      No Known Problems Paternal Grandmother     No Known Problems Paternal Grandfather     Eczema Child     Cancer Other         Breast     Emphysema Neg Hx     Colon cancer Neg Hx     Stomach cancer Neg Hx     Esophageal cancer Neg Hx     Ulcerative colitis Neg Hx     Crohn's disease Neg Hx     Irritable bowel syndrome Neg Hx     Celiac disease Neg Hx     Amblyopia Neg Hx     Blindness Neg Hx     Cataracts Neg Hx     Hypertension Neg Hx     Macular degeneration Neg Hx     Retinal detachment Neg Hx     Strabismus Neg Hx     Stroke Neg Hx     Thyroid disease Neg Hx            Ohs Peq Odvv Intake    3/18/2024  3:26 PM CDT - Filed by Patient   What is your current physical address in the event of a medical emergency? [email or web address] [place] [id number]   Are you able to take your vital signs? No   Please attach any relevant images or files          HPI: Pt presents with right ear pain since yesterday and is gradually getting worse. Pt denies any fever, chills, HA. Cough, sore throat, N/V, dizziness. Reports hx of sinus allergies.   ROS     Objective:   The physical exam was conducted virtually.  Physical Exam   Constitutional:  Non-toxic appearance. She does not appear ill. No distress.   HENT:   Ears:      Comments: Mild pain on pulling pinna.   Pulmonary/Chest: No stridor. No respiratory distress. She has no wheezes.   Neurological: She is at baseline.   Skin: Skin is not diaphoretic.       Assessment:     1. Right ear pain        Plan:   I advised pt to come to [facility] in patient's area to ear exam and further evaluation to seen if it is infection or wax,  Pt agreed to plan.  I provided UC address to patient.     Right ear pain

## 2024-03-20 ENCOUNTER — HOSPITAL ENCOUNTER (EMERGENCY)
Facility: HOSPITAL | Age: 49
Discharge: ANOTHER HOSPITAL | End: 2024-03-20
Attending: EMERGENCY MEDICINE
Payer: COMMERCIAL

## 2024-03-20 VITALS
DIASTOLIC BLOOD PRESSURE: 51 MMHG | OXYGEN SATURATION: 100 % | TEMPERATURE: 101 F | SYSTOLIC BLOOD PRESSURE: 82 MMHG | HEIGHT: 63 IN | WEIGHT: 183 LBS | HEART RATE: 95 BPM | BODY MASS INDEX: 32.43 KG/M2 | RESPIRATION RATE: 24 BRPM

## 2024-03-20 DIAGNOSIS — Z97.8 NASOGASTRIC TUBE PRESENT: ICD-10-CM

## 2024-03-20 DIAGNOSIS — R40.1 OBTUNDED: ICD-10-CM

## 2024-03-20 DIAGNOSIS — R56.9 SEIZURES: ICD-10-CM

## 2024-03-20 DIAGNOSIS — G03.9 MENINGITIS: Primary | ICD-10-CM

## 2024-03-20 PROBLEM — E87.20 LACTIC ACIDOSIS: Status: ACTIVE | Noted: 2024-03-20

## 2024-03-20 PROBLEM — G93.40 ENCEPHALOPATHY: Status: RESOLVED | Noted: 2024-03-20 | Resolved: 2024-03-20

## 2024-03-20 PROBLEM — G00.9 BACTERIAL MENINGITIS: Status: ACTIVE | Noted: 2024-03-20

## 2024-03-20 PROBLEM — G93.40 ENCEPHALOPATHY: Status: ACTIVE | Noted: 2024-03-20

## 2024-03-20 PROBLEM — G93.6 VASOGENIC BRAIN EDEMA: Status: ACTIVE | Noted: 2024-03-20

## 2024-03-20 LAB
ALBUMIN SERPL BCP-MCNC: 3.1 G/DL (ref 3.5–5.2)
ALLENS TEST: ABNORMAL
ALLENS TEST: ABNORMAL
ALP SERPL-CCNC: 25 U/L (ref 55–135)
ALT SERPL W/O P-5'-P-CCNC: 33 U/L (ref 10–44)
ANION GAP SERPL CALC-SCNC: 14 MMOL/L (ref 8–16)
ANISOCYTOSIS BLD QL SMEAR: SLIGHT
AST SERPL-CCNC: 102 U/L (ref 10–40)
BACTERIA #/AREA URNS HPF: ABNORMAL /HPF
BASOPHILS # BLD AUTO: ABNORMAL K/UL (ref 0–0.2)
BASOPHILS NFR BLD: 0 % (ref 0–1.9)
BILIRUB SERPL-MCNC: 1.6 MG/DL (ref 0.1–1)
BILIRUB UR QL STRIP: NEGATIVE
BUN SERPL-MCNC: 13 MG/DL (ref 6–20)
CALCIUM SERPL-MCNC: 9.5 MG/DL (ref 8.7–10.5)
CHLORIDE SERPL-SCNC: 105 MMOL/L (ref 95–110)
CLARITY UR: ABNORMAL
CO2 SERPL-SCNC: 15 MMOL/L (ref 23–29)
COLOR UR: YELLOW
CREAT SERPL-MCNC: 0.8 MG/DL (ref 0.5–1.4)
DELSYS: ABNORMAL
DIFFERENTIAL METHOD BLD: ABNORMAL
EOSINOPHIL # BLD AUTO: ABNORMAL K/UL (ref 0–0.5)
EOSINOPHIL NFR BLD: 0 % (ref 0–8)
ERYTHROCYTE [DISTWIDTH] IN BLOOD BY AUTOMATED COUNT: 13.8 % (ref 11.5–14.5)
ERYTHROCYTE [SEDIMENTATION RATE] IN BLOOD BY WESTERGREN METHOD: 18 MM/H
EST. GFR  (NO RACE VARIABLE): >60 ML/MIN/1.73 M^2
FERRITIN SERPL-MCNC: 305 NG/ML (ref 20–300)
FIO2: 30
GLUCOSE SERPL-MCNC: 80 MG/DL (ref 70–110)
GLUCOSE UR QL STRIP: NEGATIVE
HCO3 UR-SCNC: 18.8 MMOL/L (ref 24–28)
HCT VFR BLD AUTO: 32 % (ref 37–48.5)
HGB BLD-MCNC: 10.9 G/DL (ref 12–16)
HGB UR QL STRIP: ABNORMAL
HYALINE CASTS #/AREA URNS LPF: 0 /LPF
IMM GRANULOCYTES # BLD AUTO: ABNORMAL K/UL (ref 0–0.04)
IMM GRANULOCYTES NFR BLD AUTO: ABNORMAL % (ref 0–0.5)
INR PPP: 1.3 (ref 0.8–1.2)
IRON SERPL-MCNC: 14 UG/DL (ref 30–160)
KETONES UR QL STRIP: ABNORMAL
LACTATE SERPL-SCNC: 3.6 MMOL/L (ref 0.5–2.2)
LACTATE SERPL-SCNC: 4.7 MMOL/L (ref 0.5–2.2)
LDH SERPL L TO P-CCNC: 4.24 MMOL/L (ref 0.5–2.2)
LEUKOCYTE ESTERASE UR QL STRIP: NEGATIVE
LYMPHOCYTES # BLD AUTO: ABNORMAL K/UL (ref 1–4.8)
LYMPHOCYTES NFR BLD: 1 % (ref 18–48)
MAGNESIUM SERPL-MCNC: 1.5 MG/DL (ref 1.6–2.6)
MCH RBC QN AUTO: 30.9 PG (ref 27–31)
MCHC RBC AUTO-ENTMCNC: 34.1 G/DL (ref 32–36)
MCV RBC AUTO: 91 FL (ref 82–98)
MICROSCOPIC COMMENT: ABNORMAL
MODE: ABNORMAL
MONOCYTES # BLD AUTO: ABNORMAL K/UL (ref 0.3–1)
MONOCYTES NFR BLD: 2 % (ref 4–15)
NEUTROPHILS NFR BLD: 76 % (ref 38–73)
NEUTS BAND NFR BLD MANUAL: 21 %
NITRITE UR QL STRIP: NEGATIVE
NRBC BLD-RTO: 0 /100 WBC
OHS QRS DURATION: 72 MS
OHS QTC CALCULATION: 499 MS
PCO2 BLDA: 26.4 MMHG (ref 35–45)
PEEP: 5
PH SMN: 7.46 [PH] (ref 7.35–7.45)
PH UR STRIP: 7 [PH] (ref 5–8)
PHOSPHATE SERPL-MCNC: 2.3 MG/DL (ref 2.7–4.5)
PLATELET # BLD AUTO: 141 K/UL (ref 150–450)
PLATELET BLD QL SMEAR: ABNORMAL
PMV BLD AUTO: 10.9 FL (ref 9.2–12.9)
PO2 BLDA: 138 MMHG (ref 80–100)
POC BE: -4 MMOL/L
POC SATURATED O2: 99 % (ref 95–100)
POC TCO2: 20 MMOL/L (ref 23–27)
POTASSIUM SERPL-SCNC: 3.9 MMOL/L (ref 3.5–5.1)
PROCALCITONIN SERPL IA-MCNC: 4.82 NG/ML
PROT SERPL-MCNC: 10.1 G/DL (ref 6–8.4)
PROT UR QL STRIP: ABNORMAL
PROTHROMBIN TIME: 14.2 SEC (ref 9–12.5)
RBC # BLD AUTO: 3.53 M/UL (ref 4–5.4)
RBC #/AREA URNS HPF: 17 /HPF (ref 0–4)
RETICS/RBC NFR AUTO: 2.2 % (ref 0.5–2.5)
SAMPLE: ABNORMAL
SAMPLE: ABNORMAL
SATURATED IRON: 4 % (ref 20–50)
SITE: ABNORMAL
SITE: ABNORMAL
SODIUM SERPL-SCNC: 134 MMOL/L (ref 136–145)
SP GR UR STRIP: >1.03 (ref 1–1.03)
SQUAMOUS #/AREA URNS HPF: 2 /HPF
TOTAL IRON BINDING CAPACITY: 333 UG/DL (ref 250–450)
TRANSFERRIN SERPL-MCNC: 225 MG/DL (ref 200–375)
URN SPEC COLLECT METH UR: ABNORMAL
UROBILINOGEN UR STRIP-ACNC: ABNORMAL EU/DL
VT: 500
WBC # BLD AUTO: 16.63 K/UL (ref 3.9–12.7)
WBC #/AREA URNS HPF: 0 /HPF (ref 0–5)

## 2024-03-20 PROCEDURE — 83735 ASSAY OF MAGNESIUM: CPT | Mod: 91 | Performed by: HOSPITALIST

## 2024-03-20 PROCEDURE — 96360 HYDRATION IV INFUSION INIT: CPT | Mod: 59

## 2024-03-20 PROCEDURE — 99900035 HC TECH TIME PER 15 MIN (STAT)

## 2024-03-20 PROCEDURE — 31500 INSERT EMERGENCY AIRWAY: CPT

## 2024-03-20 PROCEDURE — 63600175 PHARM REV CODE 636 W HCPCS: Performed by: EMERGENCY MEDICINE

## 2024-03-20 PROCEDURE — 83540 ASSAY OF IRON: CPT | Performed by: HOSPITALIST

## 2024-03-20 PROCEDURE — 81000 URINALYSIS NONAUTO W/SCOPE: CPT | Performed by: EMERGENCY MEDICINE

## 2024-03-20 PROCEDURE — 96366 THER/PROPH/DIAG IV INF ADDON: CPT

## 2024-03-20 PROCEDURE — 83605 ASSAY OF LACTIC ACID: CPT

## 2024-03-20 PROCEDURE — 87040 BLOOD CULTURE FOR BACTERIA: CPT | Performed by: EMERGENCY MEDICINE

## 2024-03-20 PROCEDURE — 84100 ASSAY OF PHOSPHORUS: CPT | Mod: 91 | Performed by: HOSPITALIST

## 2024-03-20 PROCEDURE — 94761 N-INVAS EAR/PLS OXIMETRY MLT: CPT | Mod: XB

## 2024-03-20 PROCEDURE — 99284 EMERGENCY DEPT VISIT MOD MDM: CPT | Mod: ,,, | Performed by: STUDENT IN AN ORGANIZED HEALTH CARE EDUCATION/TRAINING PROGRAM

## 2024-03-20 PROCEDURE — 99900026 HC AIRWAY MAINTENANCE (STAT)

## 2024-03-20 PROCEDURE — 93005 ELECTROCARDIOGRAM TRACING: CPT

## 2024-03-20 PROCEDURE — 85045 AUTOMATED RETICULOCYTE COUNT: CPT | Performed by: HOSPITALIST

## 2024-03-20 PROCEDURE — 94002 VENT MGMT INPAT INIT DAY: CPT

## 2024-03-20 PROCEDURE — 36600 WITHDRAWAL OF ARTERIAL BLOOD: CPT

## 2024-03-20 PROCEDURE — 96365 THER/PROPH/DIAG IV INF INIT: CPT | Mod: 59

## 2024-03-20 PROCEDURE — 80053 COMPREHEN METABOLIC PANEL: CPT | Performed by: EMERGENCY MEDICINE

## 2024-03-20 PROCEDURE — 82728 ASSAY OF FERRITIN: CPT | Performed by: HOSPITALIST

## 2024-03-20 PROCEDURE — 82803 BLOOD GASES ANY COMBINATION: CPT

## 2024-03-20 PROCEDURE — 93010 ELECTROCARDIOGRAM REPORT: CPT | Mod: ,,, | Performed by: INTERNAL MEDICINE

## 2024-03-20 PROCEDURE — 27000221 HC OXYGEN, UP TO 24 HOURS

## 2024-03-20 PROCEDURE — 96367 TX/PROPH/DG ADDL SEQ IV INF: CPT | Mod: 59

## 2024-03-20 PROCEDURE — 85610 PROTHROMBIN TIME: CPT | Performed by: EMERGENCY MEDICINE

## 2024-03-20 PROCEDURE — 99291 CRITICAL CARE FIRST HOUR: CPT

## 2024-03-20 PROCEDURE — A9585 GADOBUTROL INJECTION: HCPCS | Performed by: EMERGENCY MEDICINE

## 2024-03-20 PROCEDURE — 87186 SC STD MICRODIL/AGAR DIL: CPT | Performed by: EMERGENCY MEDICINE

## 2024-03-20 PROCEDURE — 96368 THER/DIAG CONCURRENT INF: CPT

## 2024-03-20 PROCEDURE — 96375 TX/PRO/DX INJ NEW DRUG ADDON: CPT

## 2024-03-20 PROCEDURE — 85007 BL SMEAR W/DIFF WBC COUNT: CPT | Performed by: EMERGENCY MEDICINE

## 2024-03-20 PROCEDURE — 83605 ASSAY OF LACTIC ACID: CPT | Performed by: EMERGENCY MEDICINE

## 2024-03-20 PROCEDURE — 84145 PROCALCITONIN (PCT): CPT | Performed by: EMERGENCY MEDICINE

## 2024-03-20 PROCEDURE — 85027 COMPLETE CBC AUTOMATED: CPT | Performed by: EMERGENCY MEDICINE

## 2024-03-20 PROCEDURE — 25500020 PHARM REV CODE 255: Performed by: EMERGENCY MEDICINE

## 2024-03-20 PROCEDURE — 87154 CUL TYP ID BLD PTHGN 6+ TRGT: CPT | Performed by: EMERGENCY MEDICINE

## 2024-03-20 PROCEDURE — 25000003 PHARM REV CODE 250: Performed by: EMERGENCY MEDICINE

## 2024-03-20 RX ORDER — DEXAMETHASONE SODIUM PHOSPHATE 4 MG/ML
10 INJECTION, SOLUTION INTRA-ARTICULAR; INTRALESIONAL; INTRAMUSCULAR; INTRAVENOUS; SOFT TISSUE
Status: COMPLETED | OUTPATIENT
Start: 2024-03-20 | End: 2024-03-20

## 2024-03-20 RX ORDER — GADOBUTROL 604.72 MG/ML
8 INJECTION INTRAVENOUS
Status: COMPLETED | OUTPATIENT
Start: 2024-03-20 | End: 2024-03-20

## 2024-03-20 RX ORDER — ROCURONIUM BROMIDE 10 MG/ML
80 INJECTION, SOLUTION INTRAVENOUS
Status: COMPLETED | OUTPATIENT
Start: 2024-03-20 | End: 2024-03-20

## 2024-03-20 RX ORDER — CHLORHEXIDINE GLUCONATE ORAL RINSE 1.2 MG/ML
15 SOLUTION DENTAL 2 TIMES DAILY
Status: DISCONTINUED | OUTPATIENT
Start: 2024-03-20 | End: 2024-03-20

## 2024-03-20 RX ORDER — LEVETIRACETAM 500 MG/5ML
1000 INJECTION, SOLUTION, CONCENTRATE INTRAVENOUS EVERY 12 HOURS
Status: DISCONTINUED | OUTPATIENT
Start: 2024-03-20 | End: 2024-03-20 | Stop reason: HOSPADM

## 2024-03-20 RX ORDER — NOREPINEPHRINE BITARTRATE/D5W 4MG/250ML
0-3 PLASTIC BAG, INJECTION (ML) INTRAVENOUS CONTINUOUS
Status: DISCONTINUED | OUTPATIENT
Start: 2024-03-20 | End: 2024-03-20 | Stop reason: HOSPADM

## 2024-03-20 RX ORDER — PANTOPRAZOLE SODIUM 40 MG/10ML
40 INJECTION, POWDER, LYOPHILIZED, FOR SOLUTION INTRAVENOUS DAILY
Status: DISCONTINUED | OUTPATIENT
Start: 2024-03-21 | End: 2024-03-20 | Stop reason: HOSPADM

## 2024-03-20 RX ORDER — LEVETIRACETAM 500 MG/5ML
2000 INJECTION, SOLUTION, CONCENTRATE INTRAVENOUS ONCE
Status: COMPLETED | OUTPATIENT
Start: 2024-03-20 | End: 2024-03-20

## 2024-03-20 RX ORDER — ETOMIDATE 2 MG/ML
20 INJECTION INTRAVENOUS
Status: COMPLETED | OUTPATIENT
Start: 2024-03-20 | End: 2024-03-20

## 2024-03-20 RX ORDER — ACETAMINOPHEN 650 MG/1
650 SUPPOSITORY RECTAL
Status: COMPLETED | OUTPATIENT
Start: 2024-03-20 | End: 2024-03-20

## 2024-03-20 RX ORDER — PROPOFOL 10 MG/ML
0-50 INJECTION, EMULSION INTRAVENOUS
Status: COMPLETED | OUTPATIENT
Start: 2024-03-20 | End: 2024-03-20

## 2024-03-20 RX ORDER — LORAZEPAM 2 MG/ML
2 INJECTION INTRAMUSCULAR
Status: COMPLETED | OUTPATIENT
Start: 2024-03-20 | End: 2024-03-20

## 2024-03-20 RX ADMIN — ETOMIDATE 20 MG: 2 INJECTION INTRAVENOUS at 12:03

## 2024-03-20 RX ADMIN — ACETAMINOPHEN 650 MG: 650 SUPPOSITORY RECTAL at 11:03

## 2024-03-20 RX ADMIN — AMPICILLIN SODIUM 2 G: 2 INJECTION, POWDER, FOR SOLUTION INTRAMUSCULAR; INTRAVENOUS at 12:03

## 2024-03-20 RX ADMIN — LEVETIRACETAM 2000 MG: 100 INJECTION, SOLUTION INTRAVENOUS at 11:03

## 2024-03-20 RX ADMIN — CEFTRIAXONE 2 G: 2 INJECTION, POWDER, FOR SOLUTION INTRAMUSCULAR; INTRAVENOUS at 11:03

## 2024-03-20 RX ADMIN — SODIUM CHLORIDE, POTASSIUM CHLORIDE, SODIUM LACTATE AND CALCIUM CHLORIDE 1000 ML: 600; 310; 30; 20 INJECTION, SOLUTION INTRAVENOUS at 11:03

## 2024-03-20 RX ADMIN — ROCURONIUM BROMIDE 80 MG: 10 INJECTION, SOLUTION INTRAVENOUS at 12:03

## 2024-03-20 RX ADMIN — DEXAMETHASONE SODIUM PHOSPHATE 10 MG: 4 INJECTION INTRA-ARTICULAR; INTRALESIONAL; INTRAMUSCULAR; INTRAVENOUS; SOFT TISSUE at 01:03

## 2024-03-20 RX ADMIN — LORAZEPAM 2 MG: 2 INJECTION INTRAMUSCULAR; INTRAVENOUS at 11:03

## 2024-03-20 RX ADMIN — ACYCLOVIR SODIUM 520 MG: 50 INJECTION, SOLUTION INTRAVENOUS at 04:03

## 2024-03-20 RX ADMIN — PROPOFOL 20 MCG/KG/MIN: 10 INJECTION, EMULSION INTRAVENOUS at 12:03

## 2024-03-20 RX ADMIN — NOREPINEPHRINE BITARTRATE 0.03 MCG/KG/MIN: 4 INJECTION, SOLUTION INTRAVENOUS at 07:03

## 2024-03-20 RX ADMIN — GADOBUTROL 8 ML: 604.72 INJECTION INTRAVENOUS at 02:03

## 2024-03-20 RX ADMIN — SODIUM CHLORIDE, POTASSIUM CHLORIDE, SODIUM LACTATE AND CALCIUM CHLORIDE 1000 ML: 600; 310; 30; 20 INJECTION, SOLUTION INTRAVENOUS at 03:03

## 2024-03-20 RX ADMIN — VANCOMYCIN HYDROCHLORIDE 2000 MG: 500 INJECTION, POWDER, LYOPHILIZED, FOR SOLUTION INTRAVENOUS at 11:03

## 2024-03-20 NOTE — ED PROVIDER NOTES
Encounter Date: 3/20/2024    SCRIBE #1 NOTE: I, Farnaz Joe, am scribing for, and in the presence of,  Kedar Centeno MD. I have scribed the following portions of the note - Other sections scribed: HPI, ROS.       History     Chief Complaint   Patient presents with    Seizures     Pt arrived to the ED actively seizing in triage. Unknown hx of seizures     This 48 y.o female, with a medical history of Asthma, GERD, Iron deficiency anemia, Sickle cell trait, and Sjogren's disease, presents to the for seizures. Pt arrived to the ED actively seizing with incontinence. Per son, pt had been c/o left ear pain. Further pt history is limited as pt is seizing at this time.     Per son and  patient was recently transitioned off of all of her medications as she has been doing well.  She has not had seizures in a long time.  Son reports she has been having trouble with ear infections, recently was evaluated and treated for an external ear infection with some drops.  Reports she has been using that.  He reports yesterday she was intermittently confused, was not responding as well as she should be.  He reports this morning she was difficult to arouse and ended up having a seizure for which she brought her to the emergency department.  He reports she has had seizures previously but nothing recent, is not taking any medications for it.  He does report that she has had an issue similar to this in the past and she was considerably more ill at that point.  He reports no recent ill contacts.  No further complaints reported.      The history is provided by the patient.     Review of patient's allergies indicates:   Allergen Reactions    Sulfa dyne Shortness Of Breath     Other reaction(s): CAUSES ASTHMA ATTACK, Is not sure of the name of the medication that she is allergic to    Peanut Hives     Causes tongue and lips to itch    Sulfa (sulfonamide antibiotics) Other (See Comments)     Past Medical History:   Diagnosis Date     Allergy     Asthma     Eye injury at age 19    hit in os     General anesthetics causing adverse effect in therapeutic use     GERD (gastroesophageal reflux disease)     Iron deficiency anemia     Sickle cell trait 2019    Sjogren's disease      Past Surgical History:   Procedure Laterality Date     SECTION      x4    COLONOSCOPY N/A 2019    Procedure: COLONOSCOPY;  Surgeon: Karri Barragan MD;  Location: [facility] ([address] [place] [address]);  Service: Endoscopy;  Laterality: N/A;    MYOMECTOMY      SINUS SURGERY      TUBAL LIGATION      done during myometomy surgery.    upper gi  2016     Family History   Problem Relation Age of Onset    Cancer Mother         Lung/Cervical    Glaucoma Father     Diabetes Father     No Known Problems Sister     No Known Problems Brother     No Known Problems Maternal Aunt     No Known Problems Maternal Uncle     No Known Problems Paternal Aunt     No Known Problems Paternal Uncle     Cancer Maternal Grandmother         Breast/Cervical    No Known Problems Maternal Grandfather     No Known Problems Paternal Grandmother     No Known Problems Paternal Grandfather     Eczema Child     Cancer Other         Breast     Emphysema Neg Hx     Colon cancer Neg Hx     Stomach cancer Neg Hx     Esophageal cancer Neg Hx     Ulcerative colitis Neg Hx     Crohn's disease Neg Hx     Irritable bowel syndrome Neg Hx     Celiac disease Neg Hx     Amblyopia Neg Hx     Blindness Neg Hx     Cataracts Neg Hx     Hypertension Neg Hx     Macular degeneration Neg Hx     Retinal detachment Neg Hx     Strabismus Neg Hx     Stroke Neg Hx     Thyroid disease Neg Hx      Social History     Tobacco Use    Smoking status: Never     Passive exposure: Never    Smokeless tobacco: Never   Substance Use Topics    Alcohol use: No     Alcohol/week: 0.0 standard drinks of alcohol    Drug use: No     Review of Systems   Unable to perform ROS: Other (Patient actively seizing)   HENT:  Positive for ear pain  (left).    Genitourinary:         (+) incontinence   Neurological:  Positive for seizures.       Physical Exam     Initial Vitals   BP Pulse Resp Temp SpO2   03/20/24 1114 03/20/24 1126 03/20/24 1127 03/20/24 1057 03/20/24 1126   138/88 (!) 134 (!) 24 (!) 101 °F (38.3 °C) 100 %      MAP       --                Physical Exam    Nursing note and vitals reviewed.  Constitutional: She is not diaphoretic. She appears distressed (moderately).   HENT:   Head: Normocephalic and atraumatic.   Nose: Nose normal.   Eyes:   3 mm people on the right side, 5 mm pupil on the left side, reactive.   Neck: Neck supple. No JVD present.   Normal range of motion.  Cardiovascular:  Regular rhythm, normal heart sounds and intact distal pulses.           Tachycardic   Pulmonary/Chest: No stridor. She is in respiratory distress.   Deep, labored, mildly tachypneic   Abdominal: Abdomen is soft. Bowel sounds are normal. She exhibits no distension. There is no abdominal tenderness.   Genitourinary:    Genitourinary Comments: Incontinent of urine     Musculoskeletal:         General: No tenderness or edema.      Cervical back: Normal range of motion and neck supple.     Neurological:   GCS 6, notable withdrawal to pain on the left upper and left lower extremity, nothing on the right side.   Skin: Skin is warm and dry. Capillary refill takes less than 2 seconds. No rash noted. No erythema.         ED Course   Critical Care    Date/Time: 3/20/2024 5:03 PM    Performed by: Kedar Centeno MD  Authorized by: Kedar Centeno MD  Direct patient critical care time: 20 minutes  Additional history critical care time: 10 minutes  Ordering / reviewing critical care time: 10 minutes  Documentation critical care time: 5 minutes  Consult with family critical care time: 10 minutes  Total critical care time (exclusive of procedural time) : 55 minutes  Critical care time was exclusive of separately billable procedures and treating other patients and  teaching time.  Critical care was necessary to treat or prevent imminent or life-threatening deterioration of the following conditions: shock, circulatory failure and CNS failure or compromise.  Critical care was time spent personally by me on the following activities: development of treatment plan with patient or surrogate, evaluation of patient's response to treatment, examination of patient, obtaining history from patient or surrogate, ordering and performing treatments and interventions, ordering and review of laboratory studies, ordering and review of radiographic studies, re-evaluation of patient's condition, review of old charts and pulse oximetry.      Intubation    Date/Time: 3/20/2024 12:00 PM  Location procedure was performed: [facility] EMERGENCY DEPARTMENT    Performed by: Kedar Centeno MD  Authorized by: Kedar Centeno MD  Consent Done: Emergent Situation  Indications: airway protection  Intubation method: video-assisted  Patient status: paralyzed (RSI)  Preoxygenation: nonrebreather mask  Sedatives: etomidate  Paralytic: rocuronium  Laryngoscope size: Glide 3  Tube size: 7.5 mm  Tube type: cuffed  Number of attempts: 1  Ventilation between attempts: BVM  Cricoid pressure: no  Cords visualized: yes  Post-procedure assessment: ETCO2 monitor and chest rise  Breath sounds: clear  Cuff inflated: yes  ETT to lip: 23 cm  Tube secured with: ETT buenrostro  Chest x-ray interpreted by me, other physician and radiologist.  Chest x-ray findings: endotracheal tube in appropriate position  Patient tolerance: Patient tolerated the procedure well with no immediate complications  Estimated blood loss (mL): 0        Labs Reviewed   CBC W/ AUTO DIFFERENTIAL - Abnormal; Notable for the following components:       Result Value    WBC 16.63 (*)     RBC 3.53 (*)     Hemoglobin 10.9 (*)     Hematocrit 32.0 (*)     Platelets 141 (*)     Gran % 76.0 (*)     Lymph % 1.0 (*)     Mono % 2.0 (*)     Platelet Estimate  Decreased (*)     All other components within normal limits   COMPREHENSIVE METABOLIC PANEL - Abnormal; Notable for the following components:    Sodium 134 (*)     CO2 15 (*)     Total Protein 10.1 (*)     Albumin 3.1 (*)     Total Bilirubin 1.6 (*)     Alkaline Phosphatase 25 (*)      (*)     All other components within normal limits   LACTIC ACID, PLASMA - Abnormal; Notable for the following components:    Lactate (Lactic Acid) 4.7 (*)     All other components within normal limits    Narrative:       lactic acid  critical result(s) called and verbal readback obtained   from kerwin fernandes  by JCT3 03/20/2024 12:05   LACTIC ACID, PLASMA - Abnormal; Notable for the following components:    Lactate (Lactic Acid) 3.6 (*)     All other components within normal limits    Narrative:       LACTIC ACID critical result(s) called and verbal readback obtained   from CHRISTEN BOWLING by LB1 03/20/2024 16:38   URINALYSIS, REFLEX TO URINE CULTURE - Abnormal; Notable for the following components:    Appearance, UA Hazy (*)     Specific Gravity, UA >1.030 (*)     Protein, UA 2+ (*)     Ketones, UA 2+ (*)     Occult Blood UA 3+ (*)     Urobilinogen, UA 4.0-6.0 (*)     All other components within normal limits    Narrative:     Specimen Source->Urine   PROTIME-INR - Abnormal; Notable for the following components:    Prothrombin Time 14.2 (*)     INR 1.3 (*)     All other components within normal limits   PROCALCITONIN - Abnormal; Notable for the following components:    Procalcitonin 4.82 (*)     All other components within normal limits   URINALYSIS MICROSCOPIC - Abnormal; Notable for the following components:    RBC, UA 17 (*)     All other components within normal limits    Narrative:     Specimen Source->Urine   IRON AND TIBC - Abnormal; Notable for the following components:    Iron 14 (*)     Saturated Iron 4 (*)     All other components within normal limits   MAGNESIUM - Abnormal; Notable for the following components:     Magnesium 1.5 (*)     All other components within normal limits   PHOSPHORUS - Abnormal; Notable for the following components:    Phosphorus 2.3 (*)     All other components within normal limits   ISTAT LACTATE - Abnormal; Notable for the following components:    POC Lactate 4.24 (*)     All other components within normal limits   ISTAT PROCEDURE - Abnormal; Notable for the following components:    POC PH 7.460 (*)     POC PCO2 26.4 (*)     POC PO2 138 (*)     POC HCO3 18.8 (*)     POC BE -4 (*)     POC TCO2 20 (*)     All other components within normal limits   CULTURE, BLOOD   CULTURE, BLOOD   CULTURE, RESPIRATORY   CULTURE, CSF  (INCLUDES STAIN)   RETICULOCYTES   FERRITIN   VITAMIN B12   FOLATE   LACTIC ACID, PLASMA   CSF CELL COUNT WITH DIFFERENTIAL   GLUCOSE, CSF   PROTEIN, CSF   HERPES SIMPLEX (HSV) BY RAPID PCR, CSF        ECG Results              EKG 12-lead (Final result)        Collection Time Result Time QRS Duration [facility] QTC Calculation    03/20/24 12:20:11 03/20/24 16:59:00 72 499                     Final result by Interface, Lab In [facility] (03/20/24 16:59:05)                   Narrative:    Test Reason : R56.9,    Vent. Rate : 156 BPM     Atrial Rate : 156 BPM     P-R Int : 120 ms          QRS Dur : 072 ms      QT Int : 310 ms       P-R-T Axes : 076 065 069 degrees     QTc Int : 499 ms    Sinus tachycardia  Nonspecific ST and T wave abnormality  Abnormal ECG  When compared with ECG of 20-MAR-2024 11:39,  Significant changes have occurred  Confirmed by Lio Dangelo MD ([id number]) on 3/20/2024 4:58:56 PM    Referred By: [email or web address]   SELF           Confirmed By:Loi Dangelo MD                                  Imaging Results              X-Ray Chest AP Portable (Final result)  Result time 03/20/24 16:50:45      Final result by Yemi Ascencio MD (03/20/24 16:50:45)                   Impression:      As above.      Electronically signed by: Yemi Ascencio MD  Date:    03/20/2024  Time:    16:50                Narrative:    EXAMINATION:  XR CHEST AP PORTABLE    CLINICAL HISTORY:  Presence of other specified devices    TECHNIQUE:  AP portable supine view    COMPARISON:  Chest radiograph earlier same day at 12:24 hours, CT abdomen and pelvis 11/10/2022    FINDINGS:  Monitoring leads overlie the imaged lower chest and abdomen.  Patient is slightly rotated.  Resolution is limited by body habitus with underpenetration.    Interval placement of an enteric tube with tip projected over the medial left upper quadrant likely within the proximal stomach; however, the side port projects at the midline lower mediastinum just above the GE junction.  Recommend further advancing the enteric tube 5-6 cm for more optimal positioning within the stomach.    Nonobstructive bowel gas pattern.  No large amount of free air definitively seen.  No organomegaly or significant mass effect.  Partially imaged lung bases are grossly clear.  Otherwise no change.                                        MRI Brain W WO Contrast (Final result)  Result time 03/20/24 15:25:49      Final result by Luis E Medina MD (03/20/24 15:25:49)                   Impression:      Diffuse leptomeningeal enhancement, concerning for meningitis.    Diffuse cerebral edema pattern with crowding the cerebral sulci and basal cisterns.    Multiple scattered linear foci of diffusion restriction in the supratentorial white matter, concerning for areas of small vessel infarction or developing cerebritis.    Right mastoid air cell and middle ear cavity fluid and enhancement.  This could indicate otomastoiditis, a potential underlying etiology for intracranial infection.    Further workup as warranted clinically.    This report was flagged in Epic as abnormal.      Electronically signed by: Luis E Medina MD  Date:    03/20/2024  Time:    15:25               Narrative:    EXAMINATION:  MRI BRAIN W WO CONTRAST    CLINICAL HISTORY:  Headache, new or worsening (Age >=  50y);    TECHNIQUE:  Multiplanar multisequence MR imaging of the brain was performed before and after the administration of 8 mL Gadavist intravenous contrast.    COMPARISON:  CT head 03/20/2024 and 10/11/2023    FINDINGS:  Ventricles are stable in size.  No hydrocephalus.  No obvious intraventricular debris or hemorrhage.    Diffuse FLAIR hyperintensity throughout the cerebral sulci and cisterns with extensive leptomeningeal enhancement on postcontrast imaging.  This appears smooth without discrete nodularity.  There is associated mass effect with sulcal and cisternal crowding.    No large extra-axial fluid collections.    Multiple scattered small linear foci of diffusion restriction supratentorial white matter.  Slight perivascular configuration.  Subtle corresponding T2-FLAIR hyperintensity.    Empty sella configuration.    Bone marrow signal intensity is unremarkable.    Right mastoid air cell and middle ear cavity fluid and enhancement.    Trace left mastoid air cell fluid mild mucosal thickening in the paranasal sinuses, which are nonspecific in the setting of support lines and tubes.    Small remote left medial orbital wall fracture.    Findings were immediately relayed upon identification to the emergency room physician (Jacobo) via the epic secure chat system at approximately 15:11 as well as the consult neurologist (Singh) at 15:22                                       X-Ray Chest AP Portable (Final result)  Result time 03/20/24 12:36:16      Final result by Jamar Buitrago MD (03/20/24 12:36:16)                   Impression:      As above.      Electronically signed by: Jamar Buitrago  Date:    03/20/2024  Time:    12:36               Narrative:    EXAMINATION:  XR CHEST AP PORTABLE    CLINICAL HISTORY:  Sepsis;    TECHNIQUE:  Single frontal view of the chest was performed.    COMPARISON:  Chest radiograph performed 10/11/2023, 10:53 hours.    FINDINGS:  Monitoring leads overlie the chest.  Tip  of endotracheal tube projects approximately 30 mm above the level the bryce.    Cardiomediastinal contours appear to be within normal limits.    Lungs appear essentially clear.    No definite pneumothorax or large volume pleural effusion.    No acute findings in the visualized abdomen.    Osseous and soft tissue structures appear without definite acute change.                                        CT Head Without Contrast (Final result)  Result time 03/20/24 11:26:08      Final result by Luis E Medina MD (03/20/24 11:26:08)                   Impression:      Examination degraded by patient motion artifact.    New subtle sulcal crowding over the cerebral convexities concerning for diffuse cerebral edema.    Empty sella configuration.  Nonspecific finding, but has been associated with idiopathic intracranial hypertension (pseudotumor cerebri).  Clinical correlation advised.    Right mastoid air cell and middle ear cavity fluid.    Clinical correlation advised with further workup as warranted clinically.    This report was flagged in Epic as abnormal.      Electronically signed by: Luis E Medina MD  Date:    03/20/2024  Time:    11:26               Narrative:    EXAMINATION:  CT HEAD WITHOUT CONTRAST    CLINICAL HISTORY:  Seizure, new-onset, no history of trauma;    TECHNIQUE:  Low dose axial CT images obtained throughout the head without the use of intravenous contrast.  Axial, sagittal and coronal reconstructions were performed.    Examination degraded by patient motion artifact.    COMPARISON:  None.    FINDINGS:  Intracranial compartment:    Ventricles are stable in size.  No hydrocephalus.  No midline shift.    Sulci are less well-defined over the cerebral convexities, compared to the prior exam.    Gray-white differentiation appears fairly well maintained.  No evidence of recent or remote major vascular distribution infarct.  No acute parenchymal hemorrhage.    No extra-axial blood or fluid  collections.    Empty sella    Skull/extracranial contents (limited evaluation):    No displaced calvarial fracture.  Remote left medial orbital wall fracture.    Diffuse opacification the right mastoid air cells and middle ear cavity, increased from prior.    Prior functional endoscopic sinus surgery with mild patchy mucosal thickening in the remaining paranasal sinuses.                                       Medications   ampicillin (OMNIPEN) 2 g in sodium chloride 0.9 % 100 mL IVPB (MB+) (0 g Intravenous Stopped 3/20/24 1625)   acyclovir (ZOVIRAX) 520 mg in dextrose 5 % (D5W) 100 mL IVPB (520 mg Intravenous New Bag 3/20/24 1603)   NORepinephrine 4 mg in dextrose 5% 250 mL infusion (premix) (has no administration in time range)   vancomycin - pharmacy to dose (has no administration in time range)   cefTRIAXone (ROCEPHIN) 2 g in dextrose 5 % in water (D5W) 100 mL IVPB (MB+) (has no administration in time range)   levETIRAcetam injection 1,000 mg (has no administration in time range)   vancomycin 1,250 mg in dextrose 5 % (D5W) 250 mL IVPB (Vial-Mate) (1,250 mg Intravenous Trough Due As Scheduled Before Dose 3/21/24 2300)   chlorhexidine 0.12 % solution 15 mL (has no administration in time range)   pantoprazole injection 40 mg (has no administration in time range)   LORazepam injection 2 mg (2 mg Intravenous Given 3/20/24 1125)   acetaminophen suppository 650 mg (650 mg Rectal Given 3/20/24 1134)   lactated ringers bolus 1,000 mL (0 mLs Intravenous Stopped 3/20/24 1611)   cefTRIAXone (ROCEPHIN) 2 g in dextrose 5 % in water (D5W) 100 mL IVPB (MB+) (0 g Intravenous Stopped 3/20/24 1220)   vancomycin (VANCOCIN) 2,000 mg in dextrose 5 % (D5W) 500 mL IVPB (0 mg Intravenous Stopped 3/20/24 1520)   levETIRAcetam injection 2,000 mg (2,000 mg Intravenous Given 3/20/24 1147)   lactated ringers bolus 1,000 mL (0 mLs Intravenous Stopped 3/20/24 1626)   etomidate injection 20 mg (20 mg Intravenous Given 3/20/24 1212)   propofol  (DIPRIVAN) 10 mg/mL infusion (0 mcg/kg/min × 83 kg Intravenous Paused 3/20/24 1536)   rocuronium injection 80 mg (80 mg Intravenous Given 3/20/24 1211)   dexAMETHasone injection 10 mg (10 mg Intravenous Given 3/20/24 1350)   gadobutroL (GADAVIST) injection 8 mL (8 mLs Intravenous Given 3/20/24 1456)     Medical Decision Making  Amount and/or Complexity of Data Reviewed  Labs: ordered.  Radiology: ordered.    Risk  OTC drugs.  Prescription drug management.      MDM:    48-year-old female with past medical history as noted above presenting with seizures, febrile.  CT head shows sulcal crowding concerning for diffuse cerebral edema.  Patient immediately given antibiotics, broad-spectrum vanc, ampicillin, Rocephin as well as additional IV fluid boluses.  Ultimately patient's GCS do not improve, Keppra was loaded she require Ativan as well.  CT scan did show right-sided mastoid air cell and middle ear cavity fluid, on exam she did have evidence of bolus myringitis.  Concern at this point for bacterial meningitis.  Neurology was involved in consulted, recommending additional imaging, will hold on LP at this point given cerebral edema, Decadron q.6.  Patient was sent up to transfer for neuro critical care at [facility] with the additional [facility] facilities contacted.  ICU here was contacted and will provide additional management until a transfer bed is available.  Communicated this as well as grave situation to family at bedside.    Note was created using voice recognition software. Note may have occasional typographical or grammatical errors, garbled syntax, and other bizarre constructions that may not have been identified and edited despite good warren initial review prior to signing.             Scribe Attestation:   Scribe #1: I performed the above scribed service and the documentation accurately describes the services I performed. I attest to the accuracy of the note.                         Kedar NAVARRO  TORIE Centeno, personally performed the services described in this documentation. All medical record entries made by the scribe were at my direction and in my presence. I have reviewed the chart and agree that the record reflects my personal performance and is accurate and complete.       Clinical Impression:  Final diagnoses:  [R56.9] Seizures  [R40.1] Obtunded  [G03.9] Meningitis (Primary)  [Z97.8] Nasogastric tube present          ED Disposition Condition    Transfer to Another Facility Stable                Kedar Centeno MD  03/20/24 1730       Kedar Centeno MD  03/20/24 1831

## 2024-03-20 NOTE — PROGRESS NOTES
Pharmacokinetic Initial Assessment: IV Vancomycin    Assessment/Plan:    Initiate intravenous vancomycin with loading dose of 2000 mg once followed by a maintenance dose of vancomycin 1250mg IV every 12 hours  Desired empiric serum trough concentration is 10 to 20 mcg/mL  Draw vancomycin trough level 60 min prior to fourth dose on 3/21 at approximately 23:00  Pharmacy will continue to follow and monitor vancomycin.      Please contact pharmacy at extension [phone] with any questions regarding this assessment.     Thank you for the consult,   Lisa Petersen       Patient brief summary:  Manasa Hollins is a 48 y.o. female initiated on antimicrobial therapy with IV Vancomycin for treatment of suspected meningitis    Drug Allergies:   Review of patient's allergies indicates:   Allergen Reactions    Sulfa dyne Shortness Of Breath     Other reaction(s): CAUSES ASTHMA ATTACK, Is not sure of the name of the medication that she is allergic to    Peanut Hives     Causes tongue and lips to itch    Sulfa (sulfonamide antibiotics) Other (See Comments)       Actual Body Weight:   83 kg    Renal Function:   Estimated Creatinine Clearance: 85.8 mL/min (based on SCr of 0.8 mg/dL).,     Dialysis Method (if applicable):  N/A    CBC (last 72 hours):  Recent Labs   Lab Result Units 03/20/24  1129   WBC K/uL 16.63*   Hemoglobin g/dL 10.9*   Hematocrit % 32.0*   Platelets K/uL 141*   Gran % % 76.0*   Lymph % % 1.0*   Mono % % 2.0*   Eosinophil % % 0.0   Basophil % % 0.0   Differential Method  Manual       Metabolic Panel (last 72 hours):  Recent Labs   Lab Result Units 03/20/24  1129 03/20/24  1529   Sodium mmol/L 134*  --    Potassium mmol/L 3.9  --    Chloride mmol/L 105  --    CO2 mmol/L 15*  --    Glucose mg/dL 80  --    Glucose, UA   --  Negative   BUN mg/dL 13  --    Creatinine mg/dL 0.8  --    Albumin g/dL 3.1*  --    Total Bilirubin mg/dL 1.6*  --    Alkaline Phosphatase U/L 25*  --    AST U/L 102*  --    ALT U/L 33  --   "      Drug levels (last 3 results):  No results for input(s): "VANCOMYCINRA", "VANCORANDOM", "VANCOMYCINPE", "VANCOPEAK", "VANCOMYCINTR", "VANCOTROUGH" in the last 72 hours.    Microbiologic Results:  Microbiology Results (last 7 days)       Procedure Component Value Units Date/Time    CSF culture [[id number]]     Order Status: No result Specimen: CSF (Spinal Fluid)     Culture, Respiratory with Gram Stain [[id number]]     Order Status: No result Specimen: Respiratory from Endotracheal Aspirate     Blood culture x two cultures. Draw prior to antibiotics. [[id number]] Collected: 03/20/24 1104    Order Status: Sent Specimen: Blood Updated: 03/20/24 1222    Blood culture x two cultures. Draw prior to antibiotics. [[id number]] Collected: 03/20/24 1139    Order Status: Sent Specimen: Blood from Peripheral, Hand, Left Updated: 03/20/24 1222            "

## 2024-03-20 NOTE — SIGNIFICANT EVENT
Interval MRI:     Diffuse leptomeningeal enhancement, concerning for meningitis.     Diffuse cerebral edema pattern with crowding the cerebral sulci and basal cisterns.     Multiple scattered linear foci of diffusion restriction in the supratentorial white matter, concerning for areas of small vessel infarction or developing cerebritis.     Right mastoid air cell and middle ear cavity fluid and enhancement.  This could indicate otomastoiditis, a potential underlying etiology for intracranial infection.    Recs:   Continue broad spectrum Abx / Antivirals + Dex q 6hrs at the moment   Consider small volume CSF tap   - Gram stain / culture / viral markers  Eu natremia goals: CMP Q8hrs + PRN hypertonics for goals  Neurochecks q 1hr   Awaiting acceptance to [facility] for NCC monitoring and management   ID consult    Recs communicated with the ED physician           Bethanie Bazan MD    General Neurology, [facility],   [address] [facility] [address], [address], [place] [address]    Vascular Neurology, Endovascular Neurosurgery,   [facility], [address] [place] [address]
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
## 2024-03-20 NOTE — CONSULTS
[facility] - Emergency Dept  Neurology  Consult Note    Patient Name: Manasa Hollins  MRN: [id number]  Admission Date: 3/20/2024  Hospital Length of Stay: 0 days  Code Status: Prior   Attending Provider: Kedar Centeno MD   Consulting Provider: Bethanie Bazan MD  Primary Care Physician: Jh Kendall MD  Principal Problem:<principal problem not specified>    Inpatient consult to neurology  Consult performed by: Bethanie Bazan MD  Consult ordered by: Kedar Centeno MD         Subjective:     Chief Complaint:  Encephalopathy      HPI:   48 y.o female with a medical history of Sjogren's (on HCQ) / Sicca syndrome / hypergammaglobulinemia (but S/P Hizentra for Ig2 deficiency), PRICILA with admission concerns of encephalopathy. And neurology consulted for the same.     History from medical records review / ED team. Encephalopathy in the last 24 hrs - waxing and waning level of higher functions / with reduced level of consciousness since the morning of ED admission. Was noted to have GTC event in the ER - with associated loss of bladder continence / with seizure control with PRN ativan. Fevers on admission / with leucocytosis and elevated lactate. CT head - New subtle sulcal crowding over the cerebral convexities concerning for diffuse cerebral edema + Right mastoid air cell and middle ear cavity fluid. Background includes recurrent middle ear and sinus infection.     Currently intubated / sedated - no active withdrawals in extremities / weak cough, gag and pupils      Past Medical History:   Diagnosis Date    Allergy     Asthma     Eye injury at age 19    hit in os     General anesthetics causing adverse effect in therapeutic use     GERD (gastroesophageal reflux disease)     Iron deficiency anemia     Sickle cell trait 2019    Sjogren's disease        Past Surgical History:   Procedure Laterality Date     SECTION      x4    COLONOSCOPY N/A 2019    Procedure: COLONOSCOPY;   Surgeon: Karri Barragan MD;  Location: [facility] ([address] [place] [address]);  Service: Endoscopy;  Laterality: N/A;    MYOMECTOMY      SINUS SURGERY      TUBAL LIGATION      done during myometomy surgery.    upper gi  06/24/2016       Review of patient's allergies indicates:   Allergen Reactions    Sulfa dyne Shortness Of Breath     Other reaction(s): CAUSES ASTHMA ATTACK, Is not sure of the name of the medication that she is allergic to    Peanut Hives     Causes tongue and lips to itch    Sulfa (sulfonamide antibiotics) Other (See Comments)       Current Neurological Medications:     No current facility-administered medications on file prior to encounter.     Current Outpatient Medications on File Prior to Encounter   Medication Sig    acetaminophen (TYLENOL ARTHRITIS ORAL) Take by mouth.    albuterol (PROVENTIL/VENTOLIN HFA) 90 mcg/actuation inhaler Inhale 2 puffs into the lungs every 4 (four) hours as needed for Wheezing or Shortness of Breath.    ascorbic acid/multivit-min (EMERGEN-C ORAL) Take by mouth.    azelastine (ASTELIN) 137 mcg (0.1 %) nasal spray SPRAY 2 SPRAYS IN EACH NOSTRIL TWICE A DAY.    CARBOXYMETHYLCELLULOSE SODIUM (REFRESH TEARS OPHT) Apply 2 drops to eye as needed.     ciprofloxacin-dexAMETHasone 0.3-0.1% (CIPRODEX) 0.3-0.1 % DrpS Place 4 drops into the right ear 2 (two) times daily. for 7 days    cycloSPORINE (RESTASIS) 0.05 % ophthalmic emulsion Place 1 drop into both eyes 2 (two) times daily.    fluoride, sodium, (PREVIDENT) 0.2 % Soln USE AS DIRECTED    fluticasone propionate (FLONASE) 50 mcg/actuation nasal spray SPRAY 2 SPRAYS IN EACH NOSTRIL TWICE A DAY    hydroxychloroquine (PLAQUENIL) 200 mg tablet Take 2 tablets (400 mg total) by mouth once daily. This medication requires periodic eye exams    hydrOXYzine pamoate (VISTARIL) 25 MG Cap Take 1 capsule (25 mg total) by mouth every 6 (six) hours as needed (Anxiety).    LIDOcaine-prilocaine (EMLA) cream APPLY TO AFFECTED AREA EVERY DAY AS NEEDED     LINZESS 145 mcg Cap capsule Take 1 capsule (145 mcg total) by mouth once daily.    mv,calcium,min/iron/folic/vitK (ONE-A-DAY WOMEN'S COMPLETE ORAL) Take by mouth.    omeprazole (PRILOSEC) 40 MG capsule Take 1 capsule (40 mg total) by mouth every morning.    ondansetron (ZOFRAN-ODT) 8 MG TbDL Take 1 tablet (8 mg total) by mouth 2 (two) times daily.    pilocarpine HCl (SALAGEN) 7.5 mg tablet TAKE 1 TABLET (7.5 MG TOTAL) BY MOUTH 4 (FOUR) TIMES DAILY.    plecanatide (TRULANCE) 3 mg Tab Take 1 tablet by mouth once daily.    saliva stimulant comb. no.3 (BIOTENE) Spry by Mucous Membrane route as needed.     SALIVA SUBSTITUTION COMBO NO.9 (BIOTENE DRY MOUTH ORAL RINSE MM) by Mucous Membrane route as needed.     triamcinolone acetonide 0.1% (KENALOG) 0.1 % ointment APPLY TOPICALLY TWICE A DAY     Family History       Problem Relation (Age of Onset)    Cancer Mother, Maternal Grandmother, Other    Diabetes Father    Eczema Child    Glaucoma Father    No Known Problems Sister, Brother, Maternal Aunt, Maternal Uncle, Paternal Aunt, Paternal Uncle, Maternal Grandfather, Paternal Grandmother, Paternal Grandfather          Tobacco Use    Smoking status: Never     Passive exposure: Never    Smokeless tobacco: Never   Substance and Sexual Activity    Alcohol use: No     Alcohol/week: 0.0 standard drinks of alcohol    Drug use: No    Sexual activity: Yes     Partners: Male     Review of Systems   Unable to perform ROS: Intubated   Constitutional:  Positive for fever.   Neurological:  Positive for seizures and weakness.     Objective:     Vital Signs (Most Recent):  Temp: (!) 101 °F (38.3 °C) (03/20/24 1134)  Pulse: (!) 140 (03/20/24 1247)  Resp: 18 (03/20/24 1247)  BP: 116/66 (03/20/24 1247)  SpO2: 100 % (03/20/24 1310) Vital Signs (24h Range):  Temp:  [101 °F (38.3 °C)] 101 °F (38.3 °C)  Pulse:  [134-155] 140  Resp:  [18-24] 18  SpO2:  [100 %] 100 %  BP: (116-138)/(65-88) 116/66     Weight: 83 kg (183 lb)  Body mass index is 32.42  "kg/m².     Physical Exam  HENT:      Head: Normocephalic and atraumatic.   Eyes:      Extraocular Movements: Extraocular movements intact.          NEUROLOGICAL EXAMINATION:     MENTAL STATUS          Currently intubated / sedated - no active withdrawals in extremities / weak cough, gag and pupils        CRANIAL NERVES     CN III, IV, VI   Nystagmus: none   Ophthalmoparesis: none    CN VII   Facial expression full, symmetric.     MOTOR EXAM        No withdrawals noted      GAIT AND COORDINATION     Tremor   Resting tremor: absent      Significant Labs: Hemoglobin A1c: No results for input(s): "HGBA1C" in the last 720 hours.  Blood Culture: No results for input(s): "LABBLOO" in the last 48 hours.  BMP:   Recent Labs   Lab 03/20/24  1129   GLU 80   *   K 3.9      CO2 15*   BUN 13   CREATININE 0.8   CALCIUM 9.5     CBC:   Recent Labs   Lab 03/20/24  1129   WBC 16.63*   HGB 10.9*   HCT 32.0*   *     CMP:   Recent Labs   Lab 03/20/24  1129   GLU 80   *   K 3.9      CO2 15*   BUN 13   CREATININE 0.8   CALCIUM 9.5   PROT 10.1*   ALBUMIN 3.1*   BILITOT 1.6*   ALKPHOS 25*   *   ALT 33   ANIONGAP 14     CSF Culture: No results for input(s): "CSFCULTURE" in the last 48 hours.  CSF Studies: No results for input(s): "ALIQUT", "APPEARCSF", "COLORCSF", "CSFWBC", "CSFRBC", "GLUCCSF", "LDHCSF", "PROTEINCSF", "VDRLCSF" in the last 48 hours.  Inflammatory Markers:   Recent Labs   Lab 03/20/24  1130   PROCAL 4.82*     POCT Glucose: No results for input(s): "POCTGLUCOSE" in the last 24 hours.  Prealbumin: No results for input(s): "PREALBUMIN" in the last 48 hours.  Respiratory Culture: No results for input(s): "GSRESP", "RESPIRATORYC" in the last 48 hours.  Urine Culture: No results for input(s): "LABURIN" in the last 48 hours.  Urine Studies: No results for input(s): "COLORU", "APPEARANCEUA", "PHUR", "SPECGRAV", "PROTEINUA", "GLUCUA", "KETONESU", "BILIRUBINUA", "OCCULTUA", "NITRITE", " ""UROBILINOGEN", "LEUKOCYTESUR", "RBCUA", "WBCUA", "BACTERIA", "SQUAMEPITHEL", "HYALINECASTS" in the last 48 hours.    Invalid input(s): "JENNIFERSUR"  Recent Lab Results         03/20/24  1138   03/20/24  1130   03/20/24  1129        Procalcitonin   4.82  Comment: A concentration < 0.25 ng/mL represents a low risk of bacterial   infection.  Procalcitonin may not be accurate among patients with localized   infection, recent trauma or major surgery, immunosuppressed state,   invasive fungal infection, renal dysfunction. Decisions regarding   initiation or continuation of antibiotic therapy should not be based   solely on procalcitonin levels.           Albumin     3.1       ALP     25       Allens Test N/A           ALT     33       Anion Gap     14       Aniso     Slight       AST     102       Bands     21.0       Baso #     CANCELED  Comment: Result canceled by the ancillary.       Basophil %     0.0       BILIRUBIN TOTAL     1.6  Comment: For infants and newborns, interpretation of results should be based  on gestational age, weight and in agreement with clinical  observations.    Premature Infant recommended reference ranges:  Up to 24 hours.............<8.0 mg/dL  Up to 48 hours............<12.0 mg/dL  3-5 days..................<15.0 mg/dL  6-29 days.................<15.0 mg/dL         Site Other           BUN     13       Calcium     9.5       Chloride     105       CO2     15       Creatinine     0.8       Differential Method     Manual  Comment: CORRECTED RESULT; previously reported as Automated on 03/20/2024 at   12:11.    [C]       eGFR     >60       Eos #     CANCELED  Comment: Result canceled by the ancillary.       Eos %     0.0       Glucose     80       Gran %     76.0       Hematocrit     32.0       Hemoglobin     10.9       Immature Grans (Abs)     CANCELED  Comment: Mild elevation in immature granulocytes is non specific and   can be seen in a variety of conditions including stress response,   acute " inflammation, trauma and pregnancy. Correlation with other   laboratory and clinical findings is essential.    Result canceled by the ancillary.         Immature Granulocytes     CANCELED  Comment: Result canceled by the ancillary.       INR   1.3  Comment: Coumadin Therapy:  2.0 - 3.0 for INR for all indicators except mechanical heart valves  and antiphospholipid syndromes which should use 2.5 - 3.5.           Lactic Acid Level     4.7  Comment: Falsely low lactic acid results can be found in samples   containing >=13.0 mg/dL total bilirubin and/or >=3.5 mg/dL   direct bilirubin.  lactic acid  critical result(s) called and verbal readback obtained   from [employer] fernandes  by JCT3 03/20/2024 12:05         Lymph #     CANCELED  Comment: Result canceled by the ancillary.       Lymph %     1.0       MCH     30.9       MCHC     34.1       MCV     91       Mono #     CANCELED  Comment: Result canceled by the ancillary.       Mono %     2.0       MPV     10.9       nRBC     0       Platelet Estimate     Decreased       Platelet Count     141       POC Lactate 4.24           Potassium     3.9       PROTEIN TOTAL     10.1       PT   14.2         RBC     3.53       RDW     13.8       Sample VENOUS           Sodium     134       WBC     16.63                [C] - Corrected Result             All pertinent lab results from the past 24 hours have been reviewed.    Significant Imaging: I have reviewed and interpreted all pertinent imaging results/findings within the past 24 hours.  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 And neurology consulted for the same.     History from medical records review / ED team. Encephalopathy in the last 24 hrs - waxing and waning level of higher functions / with reduced level of consciousness since the morning of ED  admission. Was noted to have GTC event in the ER - with associated loss of bladder continence / with seizure control with PRN ativan. Fevers on admission / with leucocytosis and elevated lactate. CT head - New subtle sulcal crowding over the cerebral convexities concerning for diffuse cerebral edema + Right mastoid air cell and middle ear cavity fluid. Background includes recurrent middle ear and sinus infection.     Currently intubated / sedated - no active withdrawals in extremities / weak cough, gag and pupils     Recs:  Encephalopathy / seizures - suspect infectious meningoencephalitis - possible otitis media sinus source.     - Initiate broad spectrum Abx coverage / Van + Amp/cephalosporin vs meropenam + Dexamethasone   - Can consider antiviral coverage add ons     - MRI brain w wo contrast for evaluation of CT findings prior to LP   - Eunatremia goals / euthermia / euglycemia goals   - CSF studies: culture and viral markers  - ID consult / appreciate inputs on Abx/Antiviral management     - Seizure management - Keppra 2g load followed by 1g BID maintanance     - Prn ativan 1-2 mg IV if GTC or focal complex with secondary generalization > 1 mins / repeat ativan every 2 mins to .1mg/kg for uncontrolled seizures    -- Correct lytes as needed / control infection if any / avoid hypoxia or hypercapnia / avoid hypoglycemia   -- Correct any nutritional deficiencies / correct anemia / provide nutritional support as appropriate / ambulate when appropriate - PT/OT recs   -- seizure precautions           VTE Risk Mitigation (From admission, onward)      None            Thank you for your consult.     Bethanie Bazan MD  Neurology  [facility] - Emergency Dept

## 2024-03-20 NOTE — HPI
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 And neurology consulted for the same.     History from medical records review / ED team. Encephalopathy in the last 24 hrs - waxing and waning level of higher functions / with reduced level of consciousness since the morning of ED admission. Was noted to have GTC event in the ER - with associated loss of bladder continence / with seizure control with PRN ativan. Fevers on admission / with leucocytosis and elevated lactate. CT head - New subtle sulcal crowding over the cerebral convexities concerning for diffuse cerebral edema + Right mastoid air cell and middle ear cavity fluid. Background includes recurrent middle ear and sinus infection.     Currently intubated / sedated - no active withdrawals in extremities / weak cough, gag and pupils

## 2024-03-20 NOTE — ED NOTES
Escorted pt  and son to pt's room. Spoke with MRI who stated will visit pt room to discuss pt medical hx prior to MRI.

## 2024-03-20 NOTE — ASSESSMENT & PLAN NOTE
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 And neurology consulted for the same.     History from medical records review / ED team. Encephalopathy in the last 24 hrs - waxing and waning level of higher functions / with reduced level of consciousness since the morning of ED admission. Was noted to have GTC event in the ER - with associated loss of bladder continence / with seizure control with PRN ativan. Fevers on admission / with leucocytosis and elevated lactate. CT head - New subtle sulcal crowding over the cerebral convexities concerning for diffuse cerebral edema + Right mastoid air cell and middle ear cavity fluid. Background includes recurrent middle ear and sinus infection.     Currently intubated / sedated - no active withdrawals in extremities / weak cough, gag and pupils     Recs:  Encephalopathy / seizures - suspect infectious meningoencephalitis - possible otitis media sinus source.     - Initiate broad spectrum Abx coverage / Van + Amp/cephalosporin vs meropenam + Dexamethasone   - Can consider antiviral coverage add ons     - MRI brain w wo contrast for evaluation of CT findings prior to LP   - Eunatremia goals / euthermia / euglycemia goals   - CSF studies: culture and viral markers  - ID consult / appreciate inputs on Abx/Antiviral management     - Seizure management - Keppra 2g load followed by 1g BID maintanance     - Prn ativan 1-2 mg IV if GTC or focal complex with secondary generalization > 1 mins / repeat ativan every 2 mins to .1mg/kg for uncontrolled seizures    -- Correct lytes as needed / control infection if any / avoid hypoxia or hypercapnia / avoid hypoglycemia   -- Correct any nutritional deficiencies / correct anemia / provide nutritional support as appropriate / ambulate when appropriate - PT/OT recs   -- seizure precautions

## 2024-03-21 PROBLEM — R56.9 SEIZURES: Status: RESOLVED | Noted: 2024-03-20 | Resolved: 2024-03-21

## 2024-03-21 PROBLEM — E87.20 LACTIC ACIDOSIS: Status: RESOLVED | Noted: 2024-03-20 | Resolved: 2024-03-21

## 2024-03-21 LAB
ACINETOBACTER CALCOACETICUS/BAUMANNII COMPLEX: NOT DETECTED
BACTEROIDES FRAGILIS: NOT DETECTED
CANDIDA ALBICANS: NOT DETECTED
CANDIDA AURIS: NOT DETECTED
CANDIDA GLABRATA: NOT DETECTED
CANDIDA KRUSEI: NOT DETECTED
CANDIDA PARAPSILOSIS: NOT DETECTED
CANDIDA TROPICALIS: NOT DETECTED
CRYPTOCOCCUS NEOFORMANS/GATTII: NOT DETECTED
CTX-M GENE (ESBL PRODUCER): ABNORMAL
ENTEROBACTER CLOACAE COMPLEX: NOT DETECTED
ENTEROBACTERALES: NOT DETECTED
ENTEROCOCCUS FAECALIS: NOT DETECTED
ENTEROCOCCUS FAECIUM: NOT DETECTED
ESCHERICHIA COLI: NOT DETECTED
HAEMOPHILUS INFLUENZAE: NOT DETECTED
IMP GENE (CARBAPENEM RESISTANT): ABNORMAL
KLEBSIELLA AEROGENES: NOT DETECTED
KLEBSIELLA OXYTOCA: NOT DETECTED
KLEBSIELLA PNEUMONIAE GROUP: NOT DETECTED
KPC RESISTANCE GENE (CARBAPENEM): ABNORMAL
LISTERIA MONOCYTOGENES: NOT DETECTED
MCR-1: ABNORMAL
MEC A/C AND MREJ (MRSA): ABNORMAL
MEC A/C: ABNORMAL
NDM GENE (CARBAPENEM RESISTANT): ABNORMAL
NEISSERIA MENINGITIDIS: NOT DETECTED
OXA-48-LIKE (CARBAPENEM RESISTANT): ABNORMAL
PROTEUS SPECIES: NOT DETECTED
PSEUDOMONAS AERUGINOSA: NOT DETECTED
SALMONELLA SP: NOT DETECTED
SERRATIA MARCESCENS: NOT DETECTED
STAPHYLOCOCCUS AUREUS: NOT DETECTED
STAPHYLOCOCCUS EPIDERMIDIS: NOT DETECTED
STAPHYLOCOCCUS LUGDUNESIS: NOT DETECTED
STAPHYLOCOCCUS SPECIES: NOT DETECTED
STENOTROPHOMONAS MALTOPHILIA: NOT DETECTED
STREPTOCOCCUS AGALACTIAE: NOT DETECTED
STREPTOCOCCUS PNEUMONIAE: DETECTED
STREPTOCOCCUS PYOGENES: NOT DETECTED
STREPTOCOCCUS SPECIES: ABNORMAL
VAN A/B (VRE GENE): ABNORMAL
VIM GENE (CARBAPENEM RESISTANT): ABNORMAL

## 2024-03-22 LAB
BACTERIA BLD CULT: ABNORMAL

## 2024-05-08 DIAGNOSIS — K21.9 GASTROESOPHAGEAL REFLUX DISEASE WITHOUT ESOPHAGITIS: ICD-10-CM

## 2024-05-08 RX ORDER — OMEPRAZOLE 40 MG/1
40 CAPSULE, DELAYED RELEASE ORAL EVERY MORNING
Qty: 90 CAPSULE | Refills: 3 | Status: SHIPPED | OUTPATIENT
Start: 2024-05-08

## 2024-05-08 NOTE — TELEPHONE ENCOUNTER
No care due was identified.  [facility] Embedded Care Due Messages. Reference number: [id number].   5/08/2024 12:15:37 AM CDT

## 2025-05-07 NOTE — TELEPHONE ENCOUNTER
[facility] can no longer dispense Hizentra for patient due to insurance.  [facility] faxed all information to [facility].  I faxed over new Rx.    [phone]   EKG performed per Dr's order.

## 2025-08-06 ENCOUNTER — PATIENT MESSAGE (OUTPATIENT)
Dept: FAMILY MEDICINE | Facility: CLINIC | Age: 50
End: 2025-08-06
Payer: COMMERCIAL

## (undated) DEVICE — SEE MEDLINE ITEM 156913

## (undated) DEVICE — CONTAINER SPECIMEN STRL 4OZ

## (undated) DEVICE — SUCTION COAGULATOR 10FR 6IN

## (undated) DEVICE — ELECTRODE REM PLYHSV RETURN 9

## (undated) DEVICE — SPONGE PATTY SURGICAL .5X3IN

## (undated) DEVICE — POWDER ARISTA AH 3G

## (undated) DEVICE — SEE MEDLINE ITEM 152622

## (undated) DEVICE — Device

## (undated) DEVICE — TRACKER ENT INSTRUMENT

## (undated) DEVICE — TRACKER PATIENT NON INVASIVE

## (undated) DEVICE — BLADE TRICUT

## (undated) DEVICE — SYR 50CC LL

## (undated) DEVICE — APPLICATOR ARISTA FLEX XL

## (undated) DEVICE — DEVICE SINUS BLLN INFLATION

## (undated) DEVICE — WARMER DRAPE STERILE LF

## (undated) DEVICE — DRESSING TELFA STRL 4X3 LF

## (undated) DEVICE — BLADE INFERIOR TURBINATE 5/PK